# Patient Record
Sex: FEMALE | Race: WHITE | NOT HISPANIC OR LATINO | Employment: FULL TIME | URBAN - METROPOLITAN AREA
[De-identification: names, ages, dates, MRNs, and addresses within clinical notes are randomized per-mention and may not be internally consistent; named-entity substitution may affect disease eponyms.]

---

## 2017-02-02 ENCOUNTER — OFFICE VISIT (OUTPATIENT)
Dept: DERMATOLOGY | Facility: CLINIC | Age: 27
End: 2017-02-02

## 2017-02-02 DIAGNOSIS — L63.9 AA (ALOPECIA AREATA): Primary | ICD-10-CM

## 2017-02-02 ASSESSMENT — PAIN SCALES - GENERAL: PAINLEVEL: NO PAIN (0)

## 2017-02-02 NOTE — NURSING NOTE
"Dermatology Rooming Note    Sarah A Riedell's goals for this visit include:   Chief Complaint   Patient presents with     Derm Problem     Patient comes to clinic today for AA. States \"it's worse.\"     Madhavi Quinteros, [facility]    "

## 2017-02-02 NOTE — PROGRESS NOTES
[facility] Dermatology Note   February 2, 2017      Dermatology Problem List:   1. Alopecia areata, ophiasis distribution,  Great response to intralesional Kenalog with  -  fexofenadine 180 mg daily  - PRN: clobetasol 0.05% shampoo TIW, minoxidil 5% foam daily to any affected areas      CC: Followup alopecia areata    History of Present Illness:   Ms. Riedell is a healthy 25-year-old female returning for follow-up alopecia areata, last seen 10/20/16 at which time the above topicals were continued, no intralesional corticosteroids required.    She returns to clinic today reporting 3 new areas of hair loss, including one particular concerning one along her L frontal hairline.    ROS:  General: o/w well no f/c/ns  SKIN: No new/changing moles    Past Medical History: Negative for chronic medical conditions.     SH: Works as a  for [employer] in [place] (started in Dec).    Family History:   Negative for melanoma, chronic skin or autoimmune conditions. Sister with hypothyroidism. No family members with alopecia.     Medications: Reviewed in Epic.     Allergies: Reviewed in Epic.       Physical exam:   GENERAL: A very pleasant, healthy, young, adult  female in no discomfort or distress, sitting upright, fully clothed.   SKIN: A focused examination of the scalp and face and hands/arms was significant for skin phototype II.   - near-completely aloepcic patches on the L frontal hairline (2x1.5cm), L parietal scalp (2x2cm) and the R parietal scalp (1x1cm)  - Remainder of examination unremarkable.     Assessment/Plan:  1. Alopecia areata, ophiasis pattern initally, excellent clinical response  - repeat ILK-10 to 15 sites, total 1.5cc.  - continue minoxidil 5% foam daily to affected areas.  - in 1-2 weeks, resume clobetasol 0.05% shampoo TIW PRN (applied sparingly to dry scalp 3x a week, left in place for 15 minutes before rinsing)  - continue fexofenadine 180 mg daily    Return to clinic  in 4-6 weeks.    Discussed and examined with [facility] dermatology staff Dr. Lynne Garrett.    Tobias Murray MD  PGY-4, Dermatology    .I was present for key portions of the history and exam.  See resident note for pertinent history, exam, and treatment plan.  Lynne Garrett MD

## 2017-02-02 NOTE — MR AVS SNAPSHOT
After Visit Summary   2/2/2017    Sarah A Riedell    MRN: [id number]           Patient Information     Date Of Birth          1990        Visit Information        Provider Department      2/2/2017 8:15 AM Tobias Murray MD [facility] Dermatology         Follow-ups after your visit        Your next 10 appointments already scheduled     Mar 09, 2017  9:00 AM   (Arrive by 8:45 AM)   Return Visit with MD CARRIE Winter [facility] Dermatology ([facility] [place])    [address] [place] [id number] [phone]              Who to contact     Please call your clinic at [phone] to:    Ask questions about your health    Make or cancel appointments    Discuss your medicines    Learn about your test results    Speak to your doctor   If you have compliments or concerns about an experience at your clinic, or if you wish to file a complaint, please contact [facility] Patient Relations at [phone] or email us at [email or web address]         Additional Information About Your Visit        MyChart Information     [employer] gives you secure access to your electronic health record. If you see a primary care provider, you can also send messages to your care team and make appointments. If you have questions, please call your primary care clinic.  If you do not have a primary care provider, please call [phone] and they will assist you.      [employer] is an electronic gateway that provides easy, online access to your medical records. With [employer], you can request a clinic appointment, read your test results, renew a prescription or communicate with your care team.     To access your existing account, please contact your [facility] or call [phone] for assistance.        Care EveryWhere ID     This is your Care EveryWhere ID. This could be used by other organizations to access your [place]  medical records  [id number]         Blood Pressure from Last 3 Encounters:   10/02/15 106/69   14 100/76   14 102/61    Weight from Last 3 Encounters:   10/02/15 71.396 kg (157 lb 6.4 oz)   14 71.85 kg (158 lb 6.4 oz)   14 71.668 kg (158 lb)              Today, you had the following     No orders found for display       Primary Care Provider Office Phone # Fax #    Alta ESTIVEN Zapata [phone]       [facility]    [address] [facility] [address]  [facility] [address]        Thank you!     Thank you for choosing [facility] DERMATOLOGY  for your care. Our goal is always to provide you with excellent care. Hearing back from our patients is one way we can continue to improve our services. Please take a few minutes to complete the written survey that you may receive in the mail after your visit with us. Thank you!             Your Updated Medication List - Protect others around you: Learn how to safely use, store and throw away your medicines at www.disposemymeds.org.          This list is accurate as of: 17  8:29 AM.  Always use your most recent med list.                   Brand Name Dispense Instructions for use    clobetasol propionate 0.05 % Sham     1 Bottle    Apply sparingly to dry scalp once daily as needed.  Leave in place for 15 minutes then add water, lather and rinse thoroughly.       fexofenadine 180 MG tablet    ALLEGRA    90 tablet    Take 1 tablet (180 mg) by mouth daily       KARIVA 0.15-0.02/0.01 MG () per tablet   Generic drug:  desogestrel-ethinyl estradiol     28 tablet    TAKE 1 TABLET BY MOUTH EVERY DAY       PRE-ZAID PO      Take by mouth daily

## 2017-02-02 NOTE — Clinical Note
2/2/2017       RE: Sarah A Riedell  [address]  [place] [address]     Dear Colleague,    Thank you for referring your patient, Sarah A Riedell, to the [facility] DERMATOLOGY at [facility]. Please see a copy of my visit note below.    [facility] Dermatology Note   February 2, 2017      Dermatology Problem List:   1. Alopecia areata, ophiasis distribution,  Great response to intralesional Kenalog with  -  fexofenadine 180 mg daily  - PRN: clobetasol 0.05% shampoo TIW, minoxidil 5% foam daily to any affected areas      CC: Followup alopecia areata    History of Present Illness:   Ms. Riedell is a healthy 25-year-old female returning for follow-up alopecia areata, last seen 10/20/16 at which time the above topicals were continued, no intralesional corticosteroids required.    She returns to clinic today reporting 3 new areas of hair loss, including one particular concerning one along her L frontal hairline.    ROS:  General: o/w well no f/c/ns  SKIN: No new/changing moles    Past Medical History: Negative for chronic medical conditions.     SH: Works as a  for [employer] in [place] (started in Dec).    Family History:   Negative for melanoma, chronic skin or autoimmune conditions. Sister with hypothyroidism. No family members with alopecia.     Medications: Reviewed in Epic.     Allergies: Reviewed in Epic.       Physical exam:   GENERAL: A very pleasant, healthy, young, adult  female in no discomfort or distress, sitting upright, fully clothed.   SKIN: A focused examination of the scalp and face and hands/arms was significant for skin phototype II.   - near-completely aloepcic patches on the L frontal hairline (2x1.5cm), L parietal scalp (2x2cm) and the R parietal scalp (1x1cm)  - Remainder of examination unremarkable.     Assessment/Plan:  1. Alopecia areata, ophiasis pattern initally, excellent clinical response  - repeat ILK-10  to 15 sites, total 1.5cc.  - continue minoxidil 5% foam daily to affected areas.  - in 1-2 weeks, resume clobetasol 0.05% shampoo TIW PRN (applied sparingly to dry scalp 3x a week, left in place for 15 minutes before rinsing)  - continue fexofenadine 180 mg daily    Return to clinic in 4-6 weeks.    Discussed and examined with [facility] dermatology staff Dr. Lynne Garrett.    Tobias Murray MD  PGY-4, Dermatology    .I was present for key portions of the history and exam.  See resident note for pertinent history, exam, and treatment plan.  Lynne Garrett MD                  Pictures taken of patient today to be placed in chart for future reference.        Again, thank you for allowing me to participate in the care of your patient.      Sincerely,    Tobias Murray MD

## 2017-03-09 ENCOUNTER — OFFICE VISIT (OUTPATIENT)
Dept: DERMATOLOGY | Facility: CLINIC | Age: 27
End: 2017-03-09

## 2017-03-09 DIAGNOSIS — L63.9 AA (ALOPECIA AREATA): Primary | ICD-10-CM

## 2017-03-09 RX ORDER — FLUOCINONIDE 0.5 MG/G
CREAM TOPICAL
Qty: 60 G | Refills: 5 | Status: SHIPPED | OUTPATIENT
Start: 2017-03-09 | End: 2017-08-24

## 2017-03-09 ASSESSMENT — PAIN SCALES - GENERAL: PAINLEVEL: NO PAIN (0)

## 2017-03-09 NOTE — MR AVS SNAPSHOT
After Visit Summary   3/9/2017    Sarah A Riedell    MRN: [id number]           Patient Information     Date Of Birth          1990        Visit Information        Provider Department      3/9/2017 9:00 AM Tobias Murray MD [facility] Dermatology        Today's Diagnoses     AA (alopecia areata)    -  1       Follow-ups after your visit        Your next 10 appointments already scheduled     Apr 20, 2017  9:15 AM CDT   (Arrive by 9:00 AM)   Return Visit with Tobias Murray MD   [facility] Dermatology ([facility] [place])    [address] [place] [address] [phone]              Who to contact     Please call your clinic at [phone] to:    Ask questions about your health    Make or cancel appointments    Discuss your medicines    Learn about your test results    Speak to your doctor   If you have compliments or concerns about an experience at your clinic, or if you wish to file a complaint, please contact [facility] Patient Relations at [phone] or email us at [email or web address]         Additional Information About Your Visit        MyChart Information     [employer] gives you secure access to your electronic health record. If you see a primary care provider, you can also send messages to your care team and make appointments. If you have questions, please call your primary care clinic.  If you do not have a primary care provider, please call [phone] and they will assist you.      [employer] is an electronic gateway that provides easy, online access to your medical records. With [employer], you can request a clinic appointment, read your test results, renew a prescription or communicate with your care team.     To access your existing account, please contact your [facility] or call [phone] for assistance.        Care EveryWhere ID     This is your Care EveryWhere ID. This  could be used by other organizations to access your [place] medical records  [id number]         Blood Pressure from Last 3 Encounters:   10/02/15 106/69   12/04/14 100/76   06/12/14 102/61    Weight from Last 3 Encounters:   10/02/15 71.4 kg (157 lb 6.4 oz)   12/04/14 71.8 kg (158 lb 6.4 oz)   06/12/14 71.7 kg (158 lb)              We Performed the Following     INJECTION INTO SKIN LESIONS <=7          Today's Medication Changes          These changes are accurate as of: 3/9/17 11:52 AM.  If you have any questions, ask your nurse or doctor.               Start taking these medicines.        Dose/Directions    fluocinonide 0.05 % cream   Commonly known as:  LIDEX   Used for:  AA (alopecia areata)   Started by:  Tobias Murray MD        Apply twice daily to affected areas   Quantity:  60 g   Refills:  5       triamcinolone acetonide 10 MG/ML injection   Commonly known as:  KENALOG   Used for:  AA (alopecia areata)   Started by:  Tobias Murray MD        Dose:  10 mg   Inject 1 mL (10 mg) into the skin once for 1 dose   Quantity:  1 mL   Refills:  0            Where to get your medicines      These medications were sent to [facility] [address] [place] [id number]    Hours:  24-hours Phone:  [phone]     fluocinonide 0.05 % cream         Some of these will need a paper prescription and others can be bought over the counter.  Ask your nurse if you have questions.     You don't need a prescription for these medications     triamcinolone acetonide 10 MG/ML injection                Primary Care Provider Office Phone # Fax #    Alta Zapata [phone]       [facility]    [address] [facility] [place] [address] [address]        Thank you!     Thank you for choosing [facility] DERMATOLOGY  for your care. Our goal is always to provide you with excellent care. Hearing back from  our patients is one way we can continue to improve our services. Please take a few minutes to complete the written survey that you may receive in the mail after your visit with us. Thank you!             Your Updated Medication List - Protect others around you: Learn how to safely use, store and throw away your medicines at www.disposemymeds.org.          This list is accurate as of: 3/9/17 11:52 AM.  Always use your most recent med list.                   Brand Name Dispense Instructions for use    clobetasol propionate 0.05 % Sham     1 Bottle    Apply sparingly to dry scalp once daily as needed.  Leave in place for 15 minutes then add water, lather and rinse thoroughly.       fexofenadine 180 MG tablet    ALLEGRA    90 tablet    Take 1 tablet (180 mg) by mouth daily       fluocinonide 0.05 % cream    LIDEX    60 g    Apply twice daily to affected areas       KARIVA 0.15-0.02/0.01 MG (/) per tablet   Generic drug:  desogestrel-ethinyl estradiol     28 tablet    TAKE 1 TABLET BY MOUTH EVERY DAY       PRE-ZAID PO      Take by mouth daily       triamcinolone acetonide 10 MG/ML injection    KENALOG    1 mL    Inject 1 mL (10 mg) into the skin once for 1 dose

## 2017-03-09 NOTE — LETTER
3/9/2017       RE: Sarah A Riedell  [address]  [place] [address]     Dear Colleague,    Thank you for referring your patient, Sarah A Riedell, to the [facility] DERMATOLOGY at [facility]. Please see a copy of my visit note below.              Pictures taken of patient today to be placed in chart for future reference.        [facility] Dermatology Note   March 9, 2017      Dermatology Problem List:   1. Alopecia areata, ophiasis distribution,  Great response to intralesional Kenalog with  -  fexofenadine 180 mg daily  - PRN: clobetasol 0.05% shampoo TIW, minoxidil 5% foam daily to any affected areas      CC: Followup alopecia areata    History of Present Illness:   Ms. Riedell is a healthy 25-year-old female returning for follow-up alopecia areata, last seen 2/2/17 at which time she underwent ILK-10 to 3 new areas.    She returns to clinic today reporting improvemen in the previously treated 3 areas, no new areas or scalp symptoms. She has continued fexofenadine and minoxidil but has not resumed clobetasol 0.05% shampoo.    ROS:  General: o/w well no f/c/ns  SKIN: No new/changing moles    Past Medical History: Negative for chronic medical conditions.     SH: Works as a  for [employer] in [place] (started in Dec).    Family History:   Negative for melanoma, chronic skin or autoimmune conditions. Sister with hypothyroidism. No family members with alopecia.     Medications: Reviewed in Epic.     Allergies: Reviewed in Epic.       Physical exam:   GENERAL: A very pleasant, healthy, young, adult  female in no discomfort or distress, sitting upright, fully clothed.   SKIN: A focused examination of the scalp and face and hands/arms was significant for skin phototype II.   - near-completely aloepcic patches on the L frontal hairline (2x1.5cm with terminal hair regrowth but moderate atrophy throughout), L parietal scalp (2x2cm with  sparse regrowth) and the R parietal scalp (1x1cm with moderate regrowth throughout but a sparse center)  - Remainder of examination unremarkable.     Assessment/Plan:  1. Alopecia areata, ophiasis pattern initally, excellent clinical response to intralesional triamcinolone  - repeat ILK-10 to 10 sites, total 1.0cc.  - continue minoxidil 5% foam daily to affected areas.  - in 1-2 weeks, start fluocinonide 0.05% cream BID to all affected areas    Hold clobetasol 0.05% shampoo TIW PRN (applied sparingly to dry scalp 3x a week, left in place for 15 minutes before rinsing)  - continue fexofenadine 180 mg daily    Return to clinic in 4-6 weeks.    Discussed and examined with [facility] dermatology staff Dr. Lynne Garrett.    Tobias Murray MD  PGY-4, Dermatology   .I, Lynne Garrett MD, saw this patient with the resident and agree with the resident s findings and plan of care as documented in the resident s note.

## 2017-03-09 NOTE — PROGRESS NOTES
[facility] Dermatology Note   March 9, 2017      Dermatology Problem List:   1. Alopecia areata, ophiasis distribution,  Great response to intralesional Kenalog with  -  fexofenadine 180 mg daily  - PRN: clobetasol 0.05% shampoo TIW, minoxidil 5% foam daily to any affected areas      CC: Followup alopecia areata    History of Present Illness:   Ms. Riedell is a healthy 25-year-old female returning for follow-up alopecia areata, last seen 2/2/17 at which time she underwent ILK-10 to 3 new areas.    She returns to clinic today reporting improvemen in the previously treated 3 areas, no new areas or scalp symptoms. She has continued fexofenadine and minoxidil but has not resumed clobetasol 0.05% shampoo.    ROS:  General: o/w well no f/c/ns  SKIN: No new/changing moles    Past Medical History: Negative for chronic medical conditions.     SH: Works as a  for [employer] in [place] (started in Dec).    Family History:   Negative for melanoma, chronic skin or autoimmune conditions. Sister with hypothyroidism. No family members with alopecia.     Medications: Reviewed in Epic.     Allergies: Reviewed in Epic.       Physical exam:   GENERAL: A very pleasant, healthy, young, adult  female in no discomfort or distress, sitting upright, fully clothed.   SKIN: A focused examination of the scalp and face and hands/arms was significant for skin phototype II.   - near-completely aloepcic patches on the L frontal hairline (2x1.5cm with terminal hair regrowth but moderate atrophy throughout), L parietal scalp (2x2cm with sparse regrowth) and the R parietal scalp (1x1cm with moderate regrowth throughout but a sparse center)  - Remainder of examination unremarkable.     Assessment/Plan:  1. Alopecia areata, ophiasis pattern initally, excellent clinical response to intralesional triamcinolone  - repeat ILK-10 to 10 sites, total 1.0cc.  - continue minoxidil 5% foam daily to affected areas.  - in 1-2  weeks, start fluocinonide 0.05% cream BID to all affected areas    Hold clobetasol 0.05% shampoo TIW PRN (applied sparingly to dry scalp 3x a week, left in place for 15 minutes before rinsing)  - continue fexofenadine 180 mg daily    Return to clinic in 4-6 weeks.    Discussed and examined with [facility] dermatology staff Dr. Lynne Garrett.    Tobias Murray MD  PGY-4, Dermatology   .I, Lynne Garrett MD, saw this patient with the resident and agree with the resident s findings and plan of care as documented in the resident s note.

## 2017-03-09 NOTE — NURSING NOTE
"Dermatology Rooming Note    Sarah A Riedell's goals for this visit include:   Chief Complaint   Patient presents with     Derm Problem     Derikt comes to clinic today for hairloss. States \"some spots are better.\"     Madhavi Quinteros, [facility]      "

## 2017-04-20 ENCOUNTER — OFFICE VISIT (OUTPATIENT)
Dept: DERMATOLOGY | Facility: CLINIC | Age: 27
End: 2017-04-20

## 2017-04-20 DIAGNOSIS — L63.9 AA (ALOPECIA AREATA): Primary | ICD-10-CM

## 2017-04-20 RX ORDER — FLUOCINONIDE TOPICAL SOLUTION USP, 0.05% 0.5 MG/ML
SOLUTION TOPICAL
Qty: 60 ML | Refills: 5 | Status: SHIPPED | OUTPATIENT
Start: 2017-04-20 | End: 2017-08-24

## 2017-04-20 ASSESSMENT — PAIN SCALES - GENERAL: PAINLEVEL: NO PAIN (0)

## 2017-04-20 NOTE — LETTER
4/20/2017       RE: Sarah A Riedell  [address]  [place] [address]     Dear Colleague,    Thank you for referring your patient, Sarah A Riedell, to the [facility] DERMATOLOGY at [facility]. Please see a copy of my visit note below.    [facility] Dermatology Note   April 20, 2017    Dermatology Problem List:   1. Alopecia areata, ophiasis distribution,  Great response to intralesional Kenalog  -  fexofenadine 180 mg daily, minoxidil 5% foam daily   - PRN: fluocinonide 0.05% solution, clobetasol 0.05% shampoo TIW    CC: Followup alopecia areata    History of Present Illness:   Ms. Riedell is a healthy 26-year-old female returning for follow-up alopecia areata, last seen 6 weeks ago at which time she underwent ILK-10 to 3 areas areas and was prescribed fluocinonide 0.05% cream.    She returns to clinic today reporting received fluocinonide 0.05% ointment instead of cream, and subtle improvement in the previously treated 3 area but persistence overall. No new areas or scalp symptoms. She has continued fexofenadine and minoxidil, and his currently holding clobetasol 0.05% shampoo.    ROS:  General: o/w well no f/c/ns  SKIN: No new/changing moles    Past Medical History: Negative for chronic medical conditions.     SH: Works as a  for [employer] in [place] (started in Dec).    Family History:   Negative for melanoma, chronic skin or autoimmune conditions. Sister with hypothyroidism. No family members with alopecia.     Medications: Reviewed in Epic.     Allergies: Reviewed in Epic.       Physical exam:   GENERAL: A very pleasant, healthy, young, adult  female in no discomfort or distress, sitting upright, fully clothed.   SKIN: A focused examination of the scalp and face and hands/arms was significant for skin phototype II.   - near-completely aloepcic patches on the L frontal hairline (2x1.5cm with terminal hair regrowth but moderate  atrophy throughout), L parietal scalp (2x2cm with sparse regrowth) and the R parietal scalp (1x1cm with moderate regrowth throughout but a sparse center)  - Remainder of examination unremarkable.     Assessment/Plan:  1. Alopecia areata, ophiasis pattern initally, excellent clinical response to intralesional triamcinolone  - repeat ILK-10 to 10 sites, total 1.5cc.  - continue minoxidil 5% foam daily to affected areas.  - hold fluocinonide 0.05% ointment  - in 1-2 weeks, start fluocinonide 0.05% solution BID to all affected areas    Hold clobetasol 0.05% shampoo TIW PRN (applied sparingly to dry scalp 3x a week, left in place for 15 minutes before rinsing)  - continue fexofenadine 180 mg daily    Return to clinic in 4-6 weeks; she will cancel if density has returned to normal.    Discussed and examined with [facility] dermatology staff Dr. Lynne Garrett.  .I, Lynne Garrett MD, saw this patient with the resident and agree with the resident s findings and plan of care as documented in the resident s note.      Again, thank you for allowing me to participate in the care of your patient.      Sincerely,    Tobias Murray MD

## 2017-04-20 NOTE — NURSING NOTE
"Dermatology Rooming Note    Sarah A Riedell's goals for this visit include:   Chief Complaint   Patient presents with     Derm Problem     Lucrecia comes to clinic today for hairloss. States \"I think it's about the same.\"     Madhavi Quinteros, [facility]    "

## 2017-04-20 NOTE — PROGRESS NOTES
[facility] Dermatology Note   April 20, 2017    Dermatology Problem List:   1. Alopecia areata, ophiasis distribution,  Great response to intralesional Kenalog  -  fexofenadine 180 mg daily, minoxidil 5% foam daily   - PRN: fluocinonide 0.05% solution, clobetasol 0.05% shampoo TIW    CC: Followup alopecia areata    History of Present Illness:   Ms. Riedell is a healthy 26-year-old female returning for follow-up alopecia areata, last seen 6 weeks ago at which time she underwent ILK-10 to 3 areas areas and was prescribed fluocinonide 0.05% cream.    She returns to clinic today reporting received fluocinonide 0.05% ointment instead of cream, and subtle improvement in the previously treated 3 area but persistence overall. No new areas or scalp symptoms. She has continued fexofenadine and minoxidil, and his currently holding clobetasol 0.05% shampoo.    ROS:  General: o/w well no f/c/ns  SKIN: No new/changing moles    Past Medical History: Negative for chronic medical conditions.     SH: Works as a  for [employer] in [place] (started in Dec).    Family History:   Negative for melanoma, chronic skin or autoimmune conditions. Sister with hypothyroidism. No family members with alopecia.     Medications: Reviewed in Epic.     Allergies: Reviewed in Epic.       Physical exam:   GENERAL: A very pleasant, healthy, young, adult  female in no discomfort or distress, sitting upright, fully clothed.   SKIN: A focused examination of the scalp and face and hands/arms was significant for skin phototype II.   - near-completely aloepcic patches on the L frontal hairline (2x1.5cm with terminal hair regrowth but moderate atrophy throughout), L parietal scalp (2x2cm with sparse regrowth) and the R parietal scalp (1x1cm with moderate regrowth throughout but a sparse center)  - Remainder of examination unremarkable.     Assessment/Plan:  1. Alopecia areata, ophiasis pattern initally, excellent clinical  response to intralesional triamcinolone  - repeat ILK-10 to 10 sites, total 1.5cc.  - continue minoxidil 5% foam daily to affected areas.  - hold fluocinonide 0.05% ointment  - in 1-2 weeks, start fluocinonide 0.05% solution BID to all affected areas    Hold clobetasol 0.05% shampoo TIW PRN (applied sparingly to dry scalp 3x a week, left in place for 15 minutes before rinsing)  - continue fexofenadine 180 mg daily    Return to clinic in 4-6 weeks; she will cancel if density has returned to normal.    Discussed and examined with [facility] dermatology staff Dr. Lynne Garrett.  .I, Lynne Garrett MD, saw this patient with the resident and agree with the resident s findings and plan of care as documented in the resident s note.

## 2017-04-20 NOTE — MR AVS SNAPSHOT
After Visit Summary   4/20/2017    Sarah A Riedell    MRN: [id number]           Patient Information     Date Of Birth          1990        Visit Information        Provider Department      4/20/2017 9:15 AM Tobias Murray MD [facility] Dermatology        Today's Diagnoses     AA (alopecia areata)    -  1       Follow-ups after your visit        Your next 10 appointments already scheduled     May 25, 2017  8:15 AM CDT   (Arrive by 8:00 AM)   Return Visit with Tobias Murray MD   [facility] Dermatology ([facility])    [address] [place] [id number] [phone]              Who to contact     Please call your clinic at [phone] to:    Ask questions about your health    Make or cancel appointments    Discuss your medicines    Learn about your test results    Speak to your doctor   If you have compliments or concerns about an experience at your clinic, or if you wish to file a complaint, please contact [facility] Physicians Patient Relations at [phone] or email us at [email or web address]         Additional Information About Your Visit        MyChart Information     [employer] gives you secure access to your electronic health record. If you see a primary care provider, you can also send messages to your care team and make appointments. If you have questions, please call your primary care clinic.  If you do not have a primary care provider, please call [phone] and they will assist you.      [employer] is an electronic gateway that provides easy, online access to your medical records. With [employer], you can request a clinic appointment, read your test results, renew a prescription or communicate with your care team.     To access your existing account, please contact your [facility] Clinic or call [phone] for assistance.        Care EveryWhere ID     This is your Care EveryWhere ID. This  could be used by other organizations to access your [place] medical records  [id number]         Blood Pressure from Last 3 Encounters:   10/02/15 106/69   12/04/14 100/76   06/12/14 102/61    Weight from Last 3 Encounters:   10/02/15 71.4 kg (157 lb 6.4 oz)   12/04/14 71.8 kg (158 lb 6.4 oz)   06/12/14 71.7 kg (158 lb)              We Performed the Following     INJECTION INTO SKIN LESIONS <=7          Today's Medication Changes          These changes are accurate as of: 4/20/17 11:59 PM.  If you have any questions, ask your nurse or doctor.               Start taking these medicines.        Dose/Directions    triamcinolone acetonide 10 MG/ML injection   Commonly known as:  KENALOG   Used for:  AA (alopecia areata)   Started by:  Tobias Murray MD        Dose:  15 mg   Inject 1.5 mLs (15 mg) into the skin once for 1 dose   Quantity:  1.5 mL   Refills:  0         These medicines have changed or have updated prescriptions.        Dose/Directions    * fluocinonide 0.05 % cream   Commonly known as:  LIDEX   This may have changed:  Another medication with the same name was added. Make sure you understand how and when to take each.   Used for:  AA (alopecia areata)   Changed by:  Tobias Murray MD        Apply twice daily to affected areas   Quantity:  60 g   Refills:  5       * fluocinonide 0.05 % solution   Commonly known as:  LIDEX   This may have changed:  You were already taking a medication with the same name, and this prescription was added. Make sure you understand how and when to take each.   Used for:  AA (alopecia areata)   Changed by:  Tobias Murray MD        Apply topically 2 times daily to the scalp   Quantity:  60 mL   Refills:  5       * Notice:  This list has 2 medication(s) that are the same as other medications prescribed for you. Read the directions carefully, and ask your doctor or other care provider to review them with you.         Where to get your medicines      These medications were  sent to [facility] [address] [facility] [address] [place] [address] AT [facility]  [address] [facility] [address]     Phone:  [phone]     fluocinonide 0.05 % solution         Some of these will need a paper prescription and others can be bought over the counter.  Ask your nurse if you have questions.     You don't need a prescription for these medications     triamcinolone acetonide 10 MG/ML injection                Primary Care Provider Office Phone # Fax #    Alta ESTIVEN Zapata [phone]       [facility]    [address] [facility] [address]  [facility] [address]        Thank you!     Thank you for choosing [facility] DERMATOLOGY  for your care. Our goal is always to provide you with excellent care. Hearing back from our patients is one way we can continue to improve our services. Please take a few minutes to complete the written survey that you may receive in the mail after your visit with us. Thank you!             Your Updated Medication List - Protect others around you: Learn how to safely use, store and throw away your medicines at www.disposemymeds.org.          This list is accurate as of: 17 11:59 PM.  Always use your most recent med list.                   Brand Name Dispense Instructions for use    clobetasol propionate 0.05 % Sham     1 Bottle    Apply sparingly to dry scalp once daily as needed.  Leave in place for 15 minutes then add water, lather and rinse thoroughly.       fexofenadine 180 MG tablet    ALLEGRA    90 tablet    Take 1 tablet (180 mg) by mouth daily       * fluocinonide 0.05 % cream    LIDEX    60 g    Apply twice daily to affected areas       * fluocinonide 0.05 % solution    LIDEX    60 mL    Apply topically 2 times daily to the scalp       KARIVA 0.15-0.02/0.01 MG (/5) per tablet   Generic drug:  desogestrel-ethinyl estradiol     28 tablet    TAKE 1 TABLET BY MOUTH EVERY DAY       PRE- PO      Take by mouth daily        triamcinolone acetonide 10 MG/ML injection    KENALOG    1.5 mL    Inject 1.5 mLs (15 mg) into the skin once for 1 dose       * Notice:  This list has 2 medication(s) that are the same as other medications prescribed for you. Read the directions carefully, and ask your doctor or other care provider to review them with you.

## 2017-04-27 ENCOUNTER — OFFICE VISIT (OUTPATIENT)
Dept: DERMATOLOGY | Facility: CLINIC | Age: 27
End: 2017-04-27

## 2017-04-27 DIAGNOSIS — L63.9 AA (ALOPECIA AREATA): Primary | ICD-10-CM

## 2017-04-27 ASSESSMENT — PAIN SCALES - GENERAL: PAINLEVEL: NO PAIN (0)

## 2017-04-27 NOTE — NURSING NOTE
Dermatology Rooming Note    Lucrecia ROHAN Prasannasam's goals for this visit include:   Chief Complaint   Patient presents with     Derm Problem     Lucrecia comes to clinic today for injections to her alopecia aerata.     Madhavi Quinteros, CMA

## 2017-04-27 NOTE — PROGRESS NOTES
DERMATOLOGY FOLLOW-UP VISIT  April 27, 2017    S: 26F returning for 1wk follow-up, for treatment of a spot not noticed at her visit 1 week ago. No other concerns.    O:  GEN: Pleasant, comfortable  SKIN: Focused scalp exam:  - alopecic 2cm round patch, mid posterior frontal scalp    A/P:  1. Alopecia areata  - ILK 10 to 1 area, total 0.5cc.     RTC 4-5 weeks per previous plan.    Discussed and examined with [facility] staff dermatologist Dr. MEGAN Murray MD, RODRIGUE  PGY-4, Dermatology

## 2017-04-27 NOTE — MR AVS SNAPSHOT
After Visit Summary   4/27/2017    Sarah A Riedell    MRN: [id number]           Patient Information     Date Of Birth          1990        Visit Information        Provider Department      4/27/2017 7:45 AM Tobias Murray MD [facility] Dermatology        Today's Diagnoses     AA (alopecia areata)    -  1       Follow-ups after your visit        Your next 10 appointments already scheduled     May 25, 2017  8:15 AM CDT   (Arrive by 8:00 AM)   Return Visit with Tobias Murray MD   [facility] Dermatology ([facility] [place])    [address] [place] [address] [phone]              Who to contact     Please call your clinic at [phone] to:    Ask questions about your health    Make or cancel appointments    Discuss your medicines    Learn about your test results    Speak to your doctor   If you have compliments or concerns about an experience at your clinic, or if you wish to file a complaint, please contact [facility] Physicians Patient Relations at [phone] or email us at [email or web address]         Additional Information About Your Visit        MyChart Information     [employer] gives you secure access to your electronic health record. If you see a primary care provider, you can also send messages to your care team and make appointments. If you have questions, please call your primary care clinic.  If you do not have a primary care provider, please call [phone] and they will assist you.      [employer] is an electronic gateway that provides easy, online access to your medical records. With [employer], you can request a clinic appointment, read your test results, renew a prescription or communicate with your care team.     To access your existing account, please contact your [facility] Phys[facility] Clinic or call [phone] for assistance.        Care EveryWhere ID     This is your Care EveryWhere ID. This  could be used by other organizations to access your [place] medical records  [id number]         Blood Pressure from Last 3 Encounters:   No data found for BP    Weight from Last 3 Encounters:   No data found for Wt              Today, you had the following     No orders found for display         Today's Medication Changes          These changes are accurate as of: 4/27/17 11:59 PM.  If you have any questions, ask your nurse or doctor.               Start taking these medicines.        Dose/Directions    triamcinolone acetonide 10 MG/ML injection   Commonly known as:  KENALOG   Used for:  AA (alopecia areata)   Started by:  Tobias Murray MD        Dose:  5 mg   Inject 0.5 mLs (5 mg) into the skin once for 1 dose   Quantity:  0.5 mL   Refills:  0            Where to get your medicines      Some of these will need a paper prescription and others can be bought over the counter.  Ask your nurse if you have questions.     You don't need a prescription for these medications     triamcinolone acetonide 10 MG/ML injection                Primary Care Provider Office Phone # Fax #    Alta Zapata [phone]       [facility]    [address] [facility] [address]  [facility] [address]        Thank you!     Thank you for choosing [facility] DERMATOLOGY  for your care. Our goal is always to provide you with excellent care. Hearing back from our patients is one way we can continue to improve our services. Please take a few minutes to complete the written survey that you may receive in the mail after your visit with us. Thank you!             Your Updated Medication List - Protect others around you: Learn how to safely use, store and throw away your medicines at www.disposemymeds.org.          This list is accurate as of: 4/27/17 11:59 PM.  Always use your most recent med list.                   Brand Name Dispense Instructions for use    clobetasol propionate 0.05 % Sham     1 Bottle    Apply  sparingly to dry scalp once daily as needed.  Leave in place for 15 minutes then add water, lather and rinse thoroughly.       fexofenadine 180 MG tablet    ALLEGRA    90 tablet    Take 1 tablet (180 mg) by mouth daily       * fluocinonide 0.05 % cream    LIDEX    60 g    Apply twice daily to affected areas       * fluocinonide 0.05 % solution    LIDEX    60 mL    Apply topically 2 times daily to the scalp       KARIVA 0.15-0.02/0.01 MG () per tablet   Generic drug:  desogestrel-ethinyl estradiol     28 tablet    TAKE 1 TABLET BY MOUTH EVERY DAY       PRE- PO      Take by mouth daily       triamcinolone acetonide 10 MG/ML injection    KENALOG    0.5 mL    Inject 0.5 mLs (5 mg) into the skin once for 1 dose       * Notice:  This list has 2 medication(s) that are the same as other medications prescribed for you. Read the directions carefully, and ask your doctor or other care provider to review them with you.

## 2017-04-27 NOTE — LETTER
4/27/2017       RE: Sarah A Riedell  [address]  [place] [address]     Dear Colleague,    Thank you for referring your patient, Sarah A Riedell, to the [facility] DERMATOLOGY at [facility]. Please see a copy of my visit note below.    DERMATOLOGY FOLLOW-UP VISIT  April 27, 2017    S: 26F returning for 1wk follow-up, for treatment of a spot not noticed at her visit 1 week ago. No other concerns.    O:  GEN: Pleasant, comfortable  SKIN: Focused scalp exam:  - alopecic 2cm round patch, mid posterior frontal scalp    A/P:  1. Alopecia areata  - ILK 10 to 1 area, total 0.5cc.     RTC 4-5 weeks per previous plan.    Discussed and examined with [facility] staff dermatologist Dr. MEGAN Murray MD, RODRIGUE  PGY-4, Dermatology      Again, thank you for allowing me to participate in the care of your patient.      Sincerely,    Tobias Murray MD

## 2017-05-09 ENCOUNTER — MYC MEDICAL ADVICE (OUTPATIENT)
Dept: DERMATOLOGY | Facility: CLINIC | Age: 27
End: 2017-05-09

## 2017-05-09 DIAGNOSIS — L63.9 AA (ALOPECIA AREATA): Primary | ICD-10-CM

## 2017-05-11 RX ORDER — PREDNISONE 10 MG/1
TABLET ORAL
Qty: 80 TABLET | Refills: 0 | Status: SHIPPED | OUTPATIENT
Start: 2017-05-11 | End: 2017-08-24

## 2017-05-11 NOTE — PROGRESS NOTES
I talked with and examined Lucrecia MADRIGAL Riedell and I agree with the assessment and the plan. I was present for the entire procedure. PRUDENCE Gottlieb MD.

## 2017-05-25 ENCOUNTER — TELEPHONE (OUTPATIENT)
Dept: DERMATOLOGY | Facility: CLINIC | Age: 27
End: 2017-05-25

## 2017-05-25 DIAGNOSIS — L63.9 AA (ALOPECIA AREATA): Primary | ICD-10-CM

## 2017-05-25 RX ORDER — PREDNISONE 10 MG/1
TABLET ORAL
Qty: 84 TABLET | Refills: 0 | Status: SHIPPED | OUTPATIENT
Start: 2017-05-25 | End: 2017-08-24

## 2017-05-25 NOTE — TELEPHONE ENCOUNTER
Alopecia pt, met in clinic @ appt, pt wanting to try Xeljanz (has tried ILK injections, Rogaine, topicals, prednisone in the past). Explained process and expected timeline to pt. She desires phone calls for updates. PA initiated and pt given her portion of free drug application to complete if/ when Xeljanz denied.

## 2017-05-26 DIAGNOSIS — L63.9 ALOPECIA AREATA: ICD-10-CM

## 2017-05-26 LAB
ALBUMIN UR-MCNC: NEGATIVE MG/DL
APPEARANCE UR: CLEAR
BASOPHILS # BLD AUTO: 0 10E9/L (ref 0–0.2)
BASOPHILS NFR BLD AUTO: 0.3 %
BILIRUB UR QL STRIP: NEGATIVE
CHOLEST SERPL-MCNC: 179 MG/DL
COLOR UR AUTO: ABNORMAL
DEPRECATED CALCIDIOL+CALCIFEROL SERPL-MC: 39 UG/L (ref 20–75)
DIFFERENTIAL METHOD BLD: NORMAL
EOSINOPHIL # BLD AUTO: 0 10E9/L (ref 0–0.7)
EOSINOPHIL NFR BLD AUTO: 0 %
ERYTHROCYTE [DISTWIDTH] IN BLOOD BY AUTOMATED COUNT: 14.4 % (ref 10–15)
FERRITIN SERPL-MCNC: 4 NG/ML (ref 12–150)
GLUCOSE UR STRIP-MCNC: NEGATIVE MG/DL
HBV CORE AB SERPL QL IA: NONREACTIVE
HBV SURFACE AB SERPL IA-ACNC: 24.26 M[IU]/ML
HBV SURFACE AG SERPL QL IA: NONREACTIVE
HCT VFR BLD AUTO: 36.2 % (ref 35–47)
HCV AB SERPL QL IA: NORMAL
HDLC SERPL-MCNC: 82 MG/DL
HGB BLD-MCNC: 11.8 G/DL (ref 11.7–15.7)
HGB UR QL STRIP: NEGATIVE
HIV 1+2 AB+HIV1 P24 AG SERPL QL IA: NORMAL
IMM GRANULOCYTES # BLD: 0.1 10E9/L (ref 0–0.4)
IMM GRANULOCYTES NFR BLD: 0.6 %
IRON SATN MFR SERPL: 18 % (ref 15–46)
IRON SERPL-MCNC: 77 UG/DL (ref 35–180)
KETONES UR STRIP-MCNC: NEGATIVE MG/DL
LDLC SERPL CALC-MCNC: 80 MG/DL
LEUKOCYTE ESTERASE UR QL STRIP: NEGATIVE
LYMPHOCYTES # BLD AUTO: 0.8 10E9/L (ref 0.8–5.3)
LYMPHOCYTES NFR BLD AUTO: 9.2 %
MCH RBC QN AUTO: 27.8 PG (ref 26.5–33)
MCHC RBC AUTO-ENTMCNC: 32.6 G/DL (ref 31.5–36.5)
MCV RBC AUTO: 85 FL (ref 78–100)
MONOCYTES # BLD AUTO: 0.1 10E9/L (ref 0–1.3)
MONOCYTES NFR BLD AUTO: 0.9 %
NEUTROPHILS # BLD AUTO: 7.8 10E9/L (ref 1.6–8.3)
NEUTROPHILS NFR BLD AUTO: 89 %
NITRATE UR QL: NEGATIVE
NONHDLC SERPL-MCNC: 97 MG/DL
NRBC # BLD AUTO: 0 10*3/UL
NRBC BLD AUTO-RTO: 0 /100
PH UR STRIP: 7 PH (ref 5–7)
PLATELET # BLD AUTO: 363 10E9/L (ref 150–450)
PROT SERPL-MCNC: 7.4 G/DL (ref 6.8–8.8)
RBC # BLD AUTO: 4.24 10E12/L (ref 3.8–5.2)
RBC #/AREA URNS AUTO: 0 /HPF (ref 0–2)
SP GR UR STRIP: 1 (ref 1–1.03)
TIBC SERPL-MCNC: 434 UG/DL (ref 240–430)
TRIGL SERPL-MCNC: 88 MG/DL
TSH SERPL DL<=0.005 MIU/L-ACNC: 1.6 MU/L (ref 0.4–4)
URN SPEC COLLECT METH UR: ABNORMAL
UROBILINOGEN UR STRIP-MCNC: 0 MG/DL (ref 0–2)
WBC # BLD AUTO: 8.8 10E9/L (ref 4–11)
WBC #/AREA URNS AUTO: 0 /HPF (ref 0–2)

## 2017-05-26 NOTE — TELEPHONE ENCOUNTER
Denial from ins (as expected) based on diagnosis. Will call pt to let her know and move forward with application for hardship assistance.

## 2017-05-28 LAB — ZINC SERPL-MCNC: 84 UG/ML

## 2017-05-30 LAB
DHEA-S SERPL-MCNC: 19 UG/DL (ref 35–430)
M TB TUBERC IFN-G BLD QL: NEGATIVE
M TB TUBERC IFN-G/MITOGEN IGNF BLD: 0 IU/ML
THYROPEROXIDASE AB SERPL-ACNC: <10 IU/ML

## 2017-05-30 NOTE — TELEPHONE ENCOUNTER
Spoke to pt regarding denial and next steps. Will submit HCP portion to start the process once signed by MD.

## 2017-06-01 NOTE — PROGRESS NOTES
Reached by phone to discuss low iron but otherwise normal screening lab results. She has already purchased OTC iron supplement; cautioned RE: constipation/GI upset. Will keep same follow-up plan.

## 2017-06-01 NOTE — TELEPHONE ENCOUNTER
Received signed [employer] application from provider. Faxed application to [employer] along with PA denial and original application sent to scanning.

## 2017-06-02 NOTE — TELEPHONE ENCOUNTER
Free Drug     Medication Xeljanz  [employer]  Phone # [phone]  Fax # [phone]  Effective Dates Not yet approved - radha just started now that PA denied  Additional Information HCP portion of radha faxed to program 6/1, BI started RE: fax from [employer]/ Kaylan

## 2017-06-05 NOTE — TELEPHONE ENCOUNTER
Medication Appeal Initiation    We have initiated an appeal for the requested medication:  Medication: tofacitinib (Xeljanz) 5mg tablets  Appeal Start Date:   6/5/17  Insurance Company:  [employer]  Comments:   Per Kaylan, appeal needed for them to move forward. ALLEN written and faxed to [employer] with journal article and chart notes.

## 2017-06-05 NOTE — TELEPHONE ENCOUNTER
Ph call rec'd from [employer] program. They are completing their benefits investigation and will fax liaison a summary afterward. Rep Kimberly also states that in order to move forward with hardship assistance we will have to submit and appeal and get it denied. Will do this today and camron as urgent.

## 2017-06-09 NOTE — TELEPHONE ENCOUNTER
Calling P appeals at [phone] to check on status of [employer] appeal. Rep states denial upheld, fax sent yesterday. Fax rec'd and sent to [employer] to move fwd with pt [employer] radha.

## 2017-06-12 NOTE — TELEPHONE ENCOUNTER
BI rec'd again from [employer]. Also rec'd a phone call that they would like the PA denial paperwork refaxed. Will do this today. Uncertain if pt has sent in her portion of radha but program has been trying to contact her.

## 2017-06-15 ENCOUNTER — OFFICE VISIT (OUTPATIENT)
Dept: DERMATOLOGY | Facility: CLINIC | Age: 27
End: 2017-06-15

## 2017-06-15 ENCOUNTER — TELEPHONE (OUTPATIENT)
Dept: DERMATOLOGY | Facility: CLINIC | Age: 27
End: 2017-06-15

## 2017-06-15 DIAGNOSIS — L63.9 ALOPECIA AREATA: Primary | ICD-10-CM

## 2017-06-15 DIAGNOSIS — L63.9 AA (ALOPECIA AREATA): ICD-10-CM

## 2017-06-15 RX ORDER — PREDNISONE 10 MG/1
TABLET ORAL
Qty: 91 TABLET | Refills: 0 | Status: SHIPPED | OUTPATIENT
Start: 2017-06-15 | End: 2017-08-24

## 2017-06-15 RX ORDER — CLOBETASOL PROPIONATE 0.05 G/100ML
SHAMPOO TOPICAL
Qty: 1 BOTTLE | Refills: 3 | Status: SHIPPED | OUTPATIENT
Start: 2017-06-15 | End: 2017-11-30

## 2017-06-15 RX ORDER — FEXOFENADINE HCL 180 MG/1
180 TABLET ORAL DAILY
Qty: 90 TABLET | Refills: 3 | Status: SHIPPED | OUTPATIENT
Start: 2017-06-15 | End: 2018-06-11

## 2017-06-15 ASSESSMENT — PAIN SCALES - GENERAL: PAINLEVEL: NO PAIN (0)

## 2017-06-15 NOTE — MR AVS SNAPSHOT
After Visit Summary   6/15/2017    Sarah A Riedell    MRN: [id number]           Patient Information     Date Of Birth          1990        Visit Information        Provider Department      6/15/2017 11:15 AM Tobias Murray MD [facility] Dermatology        Today's Diagnoses     Alopecia areata    -  1    AA (alopecia areata)          Care Instructions    Hold prednisone 35 mg daily for 2 weeks, then 30 mg daily for 2 weeks.    Follow-up: 3-4 weeks.          Follow-ups after your visit        Your next 10 appointments already scheduled     Jun 29, 2017  7:45 AM CDT   (Arrive by 7:30 AM)   Return Visit with Tobias Murray MD   [facility] Dermatology ([facility])    [address] [address] [phone]              Who to contact     Please call your clinic at [phone] to:    Ask questions about your health    Make or cancel appointments    Discuss your medicines    Learn about your test results    Speak to your doctor   If you have compliments or concerns about an experience at your clinic, or if you wish to file a complaint, please contact [facility] Patient Relations at [phone] or email us at [email or web address]         Additional Information About Your Visit        MyChart Information     [employer] gives you secure access to your electronic health record. If you see a primary care provider, you can also send messages to your care team and make appointments. If you have questions, please call your primary care clinic.  If you do not have a primary care provider, please call [phone] and they will assist you.      [employer] is an electronic gateway that provides easy, online access to your medical records. With [employer] you can request a clinic appointment, read your test results, renew a prescription or communicate with your care team.     To access your existing account, please contact your  [facility] or call [phone] for assistance.        Care EveryWhere ID     This is your Care EveryWhere ID. This could be used by other organizations to access your [place] medical records  [id number]         Blood Pressure from Last 3 Encounters:   No data found for BP    Weight from Last 3 Encounters:   No data found for Wt              Today, you had the following     No orders found for display         Today's Medication Changes          These changes are accurate as of: 6/15/17 11:59 PM.  If you have any questions, ask your nurse or doctor.               These medicines have changed or have updated prescriptions.        Dose/Directions    * predniSONE 10 MG tablet   Commonly known as:  DELTASONE   This may have changed:  Another medication with the same name was added. Make sure you understand how and when to take each.   Used for:  AA (alopecia areata)   Changed by:  Tobias Murray MD        Take 5 tablets by mouth daily for 5 days, then 4 tabs for 5 days; 3 tabs for 5 days; 2 tabs for 5 days; 1 tab for 10 days, then stop.   Quantity:  80 tablet   Refills:  0       * predniSONE 10 MG tablet   Commonly known as:  DELTASONE   This may have changed:  Another medication with the same name was added. Make sure you understand how and when to take each.   Used for:  AA (alopecia areata)   Changed by:  Tobias Murray MD        Take 5 pills by mouth for 7 days, then 4 pills by mouth for 7 days, then 3 pills by mouth for 7 days.   Quantity:  84 tablet   Refills:  0       * predniSONE 10 MG tablet   Commonly known as:  DELTASONE   This may have changed:  You were already taking a medication with the same name, and this prescription was added. Make sure you understand how and when to take each.   Used for:  Alopecia areata   Changed by:  Tobias Murray MD        Take 3.5 tablets by mouth daily for 2 weeks, then decrease to 3 tablets by mouth daily for 2 weeks.   Quantity:  91  tablet   Refills:  0       * Notice:  This list has 3 medication(s) that are the same as other medications prescribed for you. Read the directions carefully, and ask your doctor or other care provider to review them with you.         Where to get your medicines      These medications were sent to [facility] [address] [place] [address] AT [facility]  [address] [place] [address]     Phone:  [phone]     clobetasol propionate 0.05 % Sham    fexofenadine 180 MG tablet    predniSONE 10 MG tablet                Primary Care Provider Office Phone # Fax #    Alta Zapata [phone]       [facility]    [address] [facility] [address] [address]  [facility] [address]        Equal Access to Services     YEISON ROMERO AH: Hadii mildred jiango Yousif, waaxda luqadaha, qaybta kaalmada ademaryyaalbania, rachel tierney. [facility] [phone].    ATENCIÓN: Si habla español, tiene a reyes disposición servicios gratuitos de asistencia lingüística. [facility] [phone].    We comply with applicable federal civil rights laws and [place] laws. We do not discriminate on the basis of race, color, national origin, age, disability sex, sexual orientation or gender identity.            Thank you!     Thank you for choosing [facility] DERMATOLOGY  for your care. Our goal is always to provide you with excellent care. Hearing back from our patients is one way we can continue to improve our services. Please take a few minutes to complete the written survey that you may receive in the mail after your visit with us. Thank you!             Your Updated Medication List - Protect others around you: Learn how to safely use, store and throw away your medicines at www.disposemymeds.org.          This list is accurate as of: 6/15/17 11:59 PM.  Always use your most recent med list.                   Brand Name Dispense Instructions for use Diagnosis    clobetasol propionate  0.05 % Sham     1 Bottle    Apply sparingly to dry scalp once daily as needed.  Leave in place for 15 minutes then add water, lather and rinse thoroughly.    AA (alopecia areata)       fexofenadine 180 MG tablet    ALLEGRA    90 tablet    Take 1 tablet (180 mg) by mouth daily    AA (alopecia areata)       * fluocinonide 0.05 % cream    LIDEX    60 g    Apply twice daily to affected areas    AA (alopecia areata)       * fluocinonide 0.05 % solution    LIDEX    60 mL    Apply topically 2 times daily to the scalp    AA (alopecia areata)       KARIVA 0.15-0.02/0.01 MG () per tablet   Generic drug:  desogestrel-ethinyl estradiol     28 tablet    TAKE 1 TABLET BY MOUTH EVERY DAY    Encounter for contraceptive management, unspecified contraceptive encounter type       PRE-ZAID PO      Take by mouth daily    AA (alopecia areata)       * predniSONE 10 MG tablet    DELTASONE    80 tablet    Take 5 tablets by mouth daily for 5 days, then 4 tabs for 5 days; 3 tabs for 5 days; 2 tabs for 5 days; 1 tab for 10 days, then stop.    AA (alopecia areata)       * predniSONE 10 MG tablet    DELTASONE    84 tablet    Take 5 pills by mouth for 7 days, then 4 pills by mouth for 7 days, then 3 pills by mouth for 7 days.    AA (alopecia areata)       * predniSONE 10 MG tablet    DELTASONE    91 tablet    Take 3.5 tablets by mouth daily for 2 weeks, then decrease to 3 tablets by mouth daily for 2 weeks.    Alopecia areata       * Notice:  This list has 5 medication(s) that are the same as other medications prescribed for you. Read the directions carefully, and ask your doctor or other care provider to review them with you.

## 2017-06-15 NOTE — PROGRESS NOTES
DERMATOLOGY FOLLOW-UP VISIT  Evonne 15, 2017  Last visit: 5/25/17    DERMATOLOGY PROBLEM LIST  1. Alopecia areata    S: 26F returning for follow-up, last seen 3 weeks ago for management of a diffuse acute scalp flare, at which time her prednisone taper was slowed.    She returns today reporting increased thinning globally on her scalp, but that it appears to be slowing. She continues to deny scalp symptoms, and reports no loss of eyebrows, lashes, or body hair elsewhere. She continues iron/Vit D/Zn supplementation, and has recently started applying essential oils to her scalp.    ROS:  CONST: otherwise well, no fevers, chills  SKIN: No new/changing moles  LYMPH: No subcutaneous lumps/bumps    O:  There were no vitals taken for this visit.  GEN: Pleasant, comfortable  SKIN: Focused scalp exam:  - worsening global thinning of the scalp, with multiple completely alopecic foci, plurifocally positive hair pull testing, focal appreciable regrowth on the L paramedian frontal scalp with diffuse follicular pinpoint fibers elsewhere, and thinly scattered nonadherent yellow-white scaling. No scarring.  - irregular superficial pitting of multiple fingernails  - eyebrows, lashes within normal limits     Labs: Reviewed in EPIC, including baseline CBC with differential, CMP, fasting lipid profile, quantiferon TB gold, HIV/Hep B core IgM/Hep B core IgG/Hep B sAG/Hep C Ab.    A/P:  1. Alopecia areata, severe flare with ongoing clinical activity which appears to be slowing, now at 30 mg prednisone, coping well psychologically. She reports intermittent palpitations and mild insomnia with 50mg daily, o/w no prednisone side effects. GAYATHRI inhibition screening labs from last visit were acceptable; she has not completed patient assistance program paperwork, an understands that the role of this sort of systemic therapy during acute flares is very unclear.  - reviewed etiology, unpredictable natural history.  - increase prednisone to 35 mg  daily for 2 week, then 30 mg daily for 2 weeks.  - reviewed oral GAYATHRI inhibition, and the uncertainty w/r/t efficacy during acute flares, disease modification, and long term safety.  - continue ketoconazole 2% shampoo TIW (renewed), alternated with clobetasol 0.05% shampoo (renewed)  - OK to continue resume oral fexofenadine 180 mg daily (case reports of efficacy in AA, mostly from [place])  - OK to continue essential oils, iron/Vit D/Zn supplementation    RTC 2 weeks.    Discussed and examined with [facility] dermatology chair Dr. Andreia Edwards.    Tobias Murray MD, RODRIGUE  PGY-4, Dermatology

## 2017-06-15 NOTE — LETTER
6/15/2017       RE: Sarah A Riedell  [address]  [place] [address]     Dear Colleague,    Thank you for referring your patient, Sarah A Riedell, to the [facility] DERMATOLOGY at [facility]. Please see a copy of my visit note below.    DERMATOLOGY FOLLOW-UP VISIT  Evonne 15, 2017  Last visit: 5/25/17    DERMATOLOGY PROBLEM LIST  1. Alopecia areata    S: 26F returning for follow-up, last seen 3 weeks ago for management of a diffuse acute scalp flare, at which time her prednisone taper was slowed.    She returns today reporting increased thinning globally on her scalp, but that it appears to be slowing. She continues to deny scalp symptoms, and reports no loss of eyebrows, lashes, or body hair elsewhere. She continues iron/Vit D/Zn supplementation, and has recently started applying essential oils to her scalp.    ROS:  CONST: otherwise well, no fevers, chills  SKIN: No new/changing moles  LYMPH: No subcutaneous lumps/bumps    O:  There were no vitals taken for this visit.  GEN: Pleasant, comfortable  SKIN: Focused scalp exam:  - worsening global thinning of the scalp, with multiple completely alopecic foci, plurifocally positive hair pull testing, focal appreciable regrowth on the L paramedian frontal scalp with diffuse follicular pinpoint fibers elsewhere, and thinly scattered nonadherent yellow-white scaling. No scarring.  - irregular superficial pitting of multiple fingernails  - eyebrows, lashes within normal limits     Labs: Reviewed in EPIC, including baseline CBC with differential, CMP, fasting lipid profile, quantiferon TB gold, HIV/Hep B core IgM/Hep B core IgG/Hep B sAG/Hep C Ab.    A/P:  1. Alopecia areata, severe flare with ongoing clinical activity which appears to be slowing, now at 30 mg prednisone, coping well psychologically. She reports intermittent palpitations and mild insomnia with 50mg daily, o/w no prednisone side effects. GAYATHRI inhibition screening  labs from last visit were acceptable; she has not completed patient assistance program paperwork, an understands that the role of this sort of systemic therapy during acute flares is very unclear.  - reviewed etiology, unpredictable natural history.  - increase prednisone to 35 mg daily for 2 week, then 30 mg daily for 2 weeks.  - reviewed oral GAYATHRI inhibition, and the uncertainty w/r/t efficacy during acute flares, disease modification, and long term safety.  - continue ketoconazole 2% shampoo TIW (renewed), alternated with clobetasol 0.05% shampoo (renewed)  - OK to continue resume oral fexofenadine 180 mg daily (case reports of efficacy in AA, mostly from [place])  - OK to continue essential oils, iron/Vit D/Zn supplementation    RTC 2 weeks.    Discussed and examined with [facility] dermatology chair Dr. Andreia Edwards.    Tobias Murray MD, RODRIGUE  PGY-4, Dermatology

## 2017-06-15 NOTE — NURSING NOTE
"Dermatology Rooming Note    Sarah A Riedell's goals for this visit include:   Chief Complaint   Patient presents with     Derm Problem     Lucrecia comes to clinic today for AA. States \"I've shed more but I am losing less than I was.\"     Madhavi Quinteros, [facility]    "

## 2017-06-16 NOTE — TELEPHONE ENCOUNTER
Have not heard further from pt or [employer] program, but per chart notes from 6/15 appt pt has not submitted her portion of pt assistance radha yet.

## 2017-06-16 NOTE — TELEPHONE ENCOUNTER
Prior Authorization Retail Medication Request  Medication/Dose: clobetasol prop (Temovate) 0.05% shampoo  Diagnosis and ICD code: AA (alopecia areata) (L63.9)  New/Renewal/Insurance Change PA: Renewal - ins change  Previously Tried and Failed Therapies: Pt on med with good success for 2 yrs; ILK injections, prednisone burst, Lidex, ketoconazole, fexofenadine    Insurance ID (if provided): [id number]  Insurance Phone (if provided): [phone]

## 2017-06-21 NOTE — TELEPHONE ENCOUNTER
PA Initiation    Medication: clobetasol prop (Temovate) 0.05% shampoo  Insurance Company: [employer] - Phone [phone] Fax [phone]  Pharmacy Filling the Rx: [facility] [address] [place] [address]  Filling Pharmacy Phone: [phone]  Filling Pharmacy Fax: [phone]  Start Date: 6/21/2017

## 2017-06-22 NOTE — TELEPHONE ENCOUNTER
Prior Authorization Approval    Authorization Effective Date: 5/21/2017  Authorization Expiration Date: 6/21/2018  Medication: clobetasol prop (Temovate) 0.05% shampoo - approved  Approved Dose/Quantity:   Reference #: CMM key# [id number]   Insurance Company: [employer] - Phone [phone] Fax [phone]  Expected CoPay: $250.44     CoPay Card Available:      Foundation Assistance Needed:    Which Pharmacy is filling the prescription (Not needed for infusion/clinic administered): [facility] [address] [place] [address] AT [facility]  Pharmacy Notified: Yes  Patient Notified: Yes    High co-pay is because of pt's deductible

## 2017-06-29 ENCOUNTER — OFFICE VISIT (OUTPATIENT)
Dept: DERMATOLOGY | Facility: CLINIC | Age: 27
End: 2017-06-29

## 2017-06-29 DIAGNOSIS — M35.9 AUTOIMMUNE DISEASE (H): ICD-10-CM

## 2017-06-29 DIAGNOSIS — L63.9 AA (ALOPECIA AREATA): Primary | ICD-10-CM

## 2017-06-29 DIAGNOSIS — L60.3 ONYCHODYSTROPHY: ICD-10-CM

## 2017-06-29 RX ORDER — PREDNISONE 10 MG/1
TABLET ORAL
Qty: 53 TABLET | Refills: 0 | Status: SHIPPED | OUTPATIENT
Start: 2017-06-29 | End: 2017-08-24

## 2017-06-29 ASSESSMENT — PAIN SCALES - GENERAL: PAINLEVEL: NO PAIN (0)

## 2017-06-29 NOTE — PROGRESS NOTES
DERMATOLOGY FOLLOW-UP VISIT  June 30, 2017  Last visit: 6/15/17    DERMATOLOGY PROBLEM LIST  1. Alopecia areata    S: 26F returning for follow-up, last seen 2  weeks ago for management of a diffuse acute scalp flare this Spring after longstanding localized disease, at which time her prednisone taper was slowed.    She returns today at 35 mg of prednisone reporting slowed hair loss, but only patchy foci of regrowth. She continues to deny scalp symptoms, and reports no loss of eyebrows, lashes, or body hair elsewhere. She continues iron/Vit D/Zn supplementation, essential oil application, and has recently started DHEAS supplementation at the direction of Dr. Bustamante of [facility]    ROS:  CONST: otherwise well, no fevers, chills  SKIN: No new/changing moles  LYMPH: No subcutaneous lumps/bumps    O:  There were no vitals taken for this visit.  GEN: Pleasant, comfortable  SKIN: Focused scalp exam:  - stable global thinning of the scalp, with multiple completely alopecic foci, positive hair pull testing over the ophiasis region, focal appreciable regrowth on the L paramedian frontal scalp with diffuse follicular pinpoint fibers elsewhere. No scarring.  - irregular superficial pitting of multiple fingernails  - eyebrows, lashes within normal limits     Labs: Reviewed in EPIC.    A/P:  1. Alopecia areata, severe flare with ongoing clinical activity which appears to be slowing, now at 35 mg prednisone after her taper was extended. Coping well psychologically. Other than mild insomnia she is experiencing, no prednisone side effects. GAYATHRI inhibition screening labs from last visit were acceptable, but she is not interested in pursuing this presently.  - reviewed etiology, unpredictable natural history.  - continue prednisone then 30 mg daily for 2 weeks then begin taper  - reviewed oral GAYATHRI inhibition, and the uncertainty w/r/t efficacy during acute flares, disease modification, and long term safety.  - repeat ILK 10  to approximately 30 sites in the scalp, total 3.0 cc  - continue ketoconazole 2% shampoo TIW (renewed), alternated with clobetasol 0.05% shampoo (renewed)  - OK to continue resume oral fexofenadine 180 mg daily (case reports of efficacy in AA, mostly from [place])  - OK to continue essential oils, iron/Vit D/Zn supplementation    RTC 6-8 weeks.    Discussed and examined with [facility] dermatology chair Dr. Andreia Edwards.    Tobias Murray MD, RODRIGUE  PGY-4, Dermatology    Patient was seen and examined with the dermatology resident. I agree with the history, review of systems, physical examination, assessments and plan. I was present for the key portion of the ILK injections. Over 50% of the visit was spent in consultation about her medications and risk/benefit of the JAK inhibitor tofacitinib.    Andreia Edwards MD  Professor and  Chair  Department of Dermatology  [facility]

## 2017-06-29 NOTE — LETTER
6/29/2017       RE: Sarah A Riedell  [address]  [place] [address]     Dear Colleague,    Thank you for referring your patient, Sarah A Riedell, to the [facility] DERMATOLOGY at [facility]. Please see a copy of my visit note below.    DERMATOLOGY FOLLOW-UP VISIT  June 30, 2017  Last visit: 6/15/17    DERMATOLOGY PROBLEM LIST  1. Alopecia areata    S: 26F returning for follow-up, last seen 2  weeks ago for management of a diffuse acute scalp flare this Spring after longstanding localized disease, at which time her prednisone taper was slowed.    She returns today at 35 mg of prednisone reporting slowed hair loss, but only patchy foci of regrowth. She continues to deny scalp symptoms, and reports no loss of eyebrows, lashes, or body hair elsewhere. She continues iron/Vit D/Zn supplementation, essential oil application, and has recently started DHEAS supplementation at the direction of Dr. Bustamante of [facility]    ROS:  CONST: otherwise well, no fevers, chills  SKIN: No new/changing moles  LYMPH: No subcutaneous lumps/bumps    O:  There were no vitals taken for this visit.  GEN: Pleasant, comfortable  SKIN: Focused scalp exam:  - stable global thinning of the scalp, with multiple completely alopecic foci, positive hair pull testing over the ophiasis region, focal appreciable regrowth on the L paramedian frontal scalp with diffuse follicular pinpoint fibers elsewhere. No scarring.  - irregular superficial pitting of multiple fingernails  - eyebrows, lashes within normal limits     Labs: Reviewed in EPIC.    A/P:  1. Alopecia areata, severe flare with ongoing clinical activity which appears to be slowing, now at 35 mg prednisone after her taper was extended. Coping well psychologically. Other than mild insomnia she is experiencing, no prednisone side effects. GAYATHRI inhibition screening labs from last visit were acceptable, but she is not interested in pursuing  this presently.  - reviewed etiology, unpredictable natural history.  - continue prednisone then 30 mg daily for 2 weeks then begin taper  - reviewed oral GAYATHRI inhibition, and the uncertainty w/r/t efficacy during acute flares, disease modification, and long term safety.  - repeat ILK 10 to approximately 30 sites in the scalp, total 3.0 cc  - continue ketoconazole 2% shampoo TIW (renewed), alternated with clobetasol 0.05% shampoo (renewed)  - OK to continue resume oral fexofenadine 180 mg daily (case reports of efficacy in AA, mostly from [place])  - OK to continue essential oils, iron/Vit D/Zn supplementation    RTC 6-8 weeks.    Discussed and examined with [facility] dermatology chair Dr. Andreia Edwards.    Tobias Murray MD, RODRIGUE  PGY-4, Dermatology    Again, thank you for allowing me to participate in the care of your patient.      Sincerely,    Tobias Murray MD

## 2017-06-29 NOTE — PATIENT INSTRUCTIONS
Continue prednisone:  30 mg daily for 1 week  30-20 x1 week  20-10 x 1 week  10-5 for 1 week  5 for 1 weeks  2.5 for 1 week.  Then stop    Follow-up: 4-6.

## 2017-06-29 NOTE — NURSING NOTE
"Dermatology Rooming Note    Sarah A Riedell's goals for this visit include:   Chief Complaint   Patient presents with     Derm Problem     Lucrecia comes to clinic today for alopecia aerata. States \"it's about the same.\"     Madhavi Quinteros, [facility]    "

## 2017-06-29 NOTE — MR AVS SNAPSHOT
After Visit Summary   6/29/2017    Sarah A Riedell    MRN: [id number]           Patient Information     Date Of Birth          1990        Visit Information        Provider Department      6/29/2017 7:45 AM Tobias Murray MD [facility] Dermatology        Today's Diagnoses     AA (alopecia areata)    -  1      Care Instructions    Continue prednisone:  30 mg daily for 1 week  30-20 x1 week  20-10 x 1 week  10-5 for 1 week  5 for 1 weeks  2.5 for 1 week.  Then stop    Follow-up: 4-6.            Follow-ups after your visit        Your next 10 appointments already scheduled     Aug 24, 2017  8:45 AM CDT   (Arrive by 8:30 AM)   Return Visit with Danae Fall MD   [facility] Dermat[facility] ([facility] [place])    [address] [place] [id number] [phone]              Who to contact     Please call your clinic at [phone] to:    Ask questions about your health    Make or cancel appointments    Discuss your medicines    Learn about your test results    Speak to your doctor   If you have compliments or concerns about an experience at your clinic, or if you wish to file a complaint, please contact [facility] Patient Relations at [phone] or email us at [email or web address]         Additional Information About Your Visit        MyChart Information     hoccer gives you secure access to your electronic health record. If you see a primary care provider, you can also send messages to your care team and make appointments. If you have questions, please call your primary care clinic.  If you do not have a primary care provider, please call [phone] and they will assist you.      hoccer is an electronic gateway that provides easy, online access to your medical records. With hoccer, you can request a clinic appointment, read your test results, renew a prescription or communicate with your care team.     To access your  existing account, please contact your [facility] or call [phone] for assistance.        Care EveryWhere ID     This is your Care EveryWhere ID. This could be used by other organizations to access your [place] medical records  [id number]         Blood Pressure from Last 3 Encounters:   05/25/17 122/80   10/02/15 106/69   12/04/14 100/76    Weight from Last 3 Encounters:   10/02/15 71.4 kg (157 lb 6.4 oz)   12/04/14 71.8 kg (158 lb 6.4 oz)   06/12/14 71.7 kg (158 lb)              Today, you had the following     No orders found for display       Primary Care Provider Office Phone # Fax #    Alta Zapata [phone]       [facility] [place]    [address]  [place]                [place] [address]        Equal Access to Services     [facility]: Hadii aad ku hadasho Yousif, waaxda luqadaha, qaybta kaalmada adeegyada, waxay idiin hayaan pepe mann . So [facility] [phone].    ATENCIÓN: Si habla español, tiene a reyes disposición servicios gratuitos de asistencia lingüística. Leigh al [phone].    We comply with applicable federal civil rights laws and [place] laws. We do not discriminate on the basis of race, color, national origin, age, disability sex, sexual orientation or gender identity.            Thank you!     Thank you for choosing [facility] DERMATOLOGY  for your care. Our goal is always to provide you with excellent care. Hearing back from our patients is one way we can continue to improve our services. Please take a few minutes to complete the written survey that you may receive in the mail after your visit with us. Thank you!             Your Updated Medication List - Protect others around you: Learn how to safely use, store and throw away your medicines at www.disposemymeds.org.          This list is accurate as of: 6/29/17  8:36 AM.  Always use your most recent med list.                   Brand Name Dispense Instructions for  use Diagnosis    clobetasol propionate 0.05 % Sham     1 Bottle    Apply sparingly to dry scalp once daily as needed.  Leave in place for 15 minutes then add water, lather and rinse thoroughly.    AA (alopecia areata)       fexofenadine 180 MG tablet    ALLEGRA    90 tablet    Take 1 tablet (180 mg) by mouth daily    AA (alopecia areata)       * fluocinonide 0.05 % cream    LIDEX    60 g    Apply twice daily to affected areas    AA (alopecia areata)       * fluocinonide 0.05 % solution    LIDEX    60 mL    Apply topically 2 times daily to the scalp    AA (alopecia areata)       KARIVA 0.15-0.02/0.01 MG () per tablet   Generic drug:  desogestrel-ethinyl estradiol     28 tablet    TAKE 1 TABLET BY MOUTH EVERY DAY    Encounter for contraceptive management, unspecified contraceptive encounter type       PRE-ZAID PO      Take by mouth daily    AA (alopecia areata)       * predniSONE 10 MG tablet    DELTASONE    80 tablet    Take 5 tablets by mouth daily for 5 days, then 4 tabs for 5 days; 3 tabs for 5 days; 2 tabs for 5 days; 1 tab for 10 days, then stop.    AA (alopecia areata)       * predniSONE 10 MG tablet    DELTASONE    84 tablet    Take 5 pills by mouth for 7 days, then 4 pills by mouth for 7 days, then 3 pills by mouth for 7 days.    AA (alopecia areata)       * predniSONE 10 MG tablet    DELTASONE    91 tablet    Take 3.5 tablets by mouth daily for 2 weeks, then decrease to 3 tablets by mouth daily for 2 weeks.    Alopecia areata       * Notice:  This list has 5 medication(s) that are the same as other medications prescribed for you. Read the directions carefully, and ask your doctor or other care provider to review them with you.

## 2017-07-12 NOTE — TELEPHONE ENCOUNTER
Spoke to [employer] program. Pt still hasn't submitted her application portion - they spoke to her and she wants to wait. States she will speak to clinic about other options. Case being put on hold.

## 2017-07-29 PROBLEM — L60.3 ONYCHODYSTROPHY: Status: ACTIVE | Noted: 2017-07-29

## 2017-08-24 ENCOUNTER — OFFICE VISIT (OUTPATIENT)
Dept: DERMATOLOGY | Facility: CLINIC | Age: 27
End: 2017-08-24

## 2017-08-24 DIAGNOSIS — E61.1 IRON DEFICIENCY: ICD-10-CM

## 2017-08-24 DIAGNOSIS — L63.9 AA (ALOPECIA AREATA): Primary | ICD-10-CM

## 2017-08-24 DIAGNOSIS — L63.9 AA (ALOPECIA AREATA): ICD-10-CM

## 2017-08-24 LAB
FERRITIN SERPL-MCNC: 21 NG/ML (ref 12–150)
IRON SATN MFR SERPL: 36 % (ref 15–46)
IRON SERPL-MCNC: 139 UG/DL (ref 35–180)
TIBC SERPL-MCNC: 385 UG/DL (ref 240–430)

## 2017-08-24 ASSESSMENT — PAIN SCALES - GENERAL
PAINLEVEL: MILD PAIN (2)
PAINLEVEL: NO PAIN (0)

## 2017-08-24 NOTE — MR AVS SNAPSHOT
After Visit Summary   8/24/2017    Sarah A Riedell    MRN: [id number]           Patient Information     Date Of Birth          1990        Visit Information        Provider Department      8/24/2017 8:45 AM Danae Fall MD [facility] Dermatology         Follow-ups after your visit        Your next 10 appointments already scheduled     Aug 24, 2017  9:15 AM CDT   LAB with  LAB   [facility] ([facility])    [address] [place] [address] [phone]           Patient must bring picture ID. Patient should be prepared to give a urine specimen  Please do not eat 10-12 hours before your appointment if you are coming in fasting for labs on lipids, cholesterol, or glucose (sugar). Pregnant women should follow their Care Team instructions. Water with medications is okay. Do not drink coffee or other fluids. If you have concerns about taking  your medications, please ask at office or if scheduling via [employer]town[employer], send a message by clicking on Secure Messaging, Message Your Care Team.            Sep 21, 2017  7:45 AM CDT   (Arrive by 7:30 AM)   RETURN HAIRLOSS with Danae Fall MD   [facility] Dermatology ([facility])    [address] [place] [address] [phone]              Who to contact     Please call your clinic at [phone] to:    Ask questions about your health    Make or cancel appointments    Discuss your medicines    Learn about your test results    Speak to your doctor   If you have compliments or concerns about an experience at your clinic, or if you wish to file a complaint, please contact [facility] Patient Relations at [phone] or email us at [email or web address]         Additional Information About Your Visit        Flow[employer] Information     [employer] gives you secure access to your electronic health record. If you see a primary care  provider, you can also send messages to your care team and make appointments. If you have questions, please call your primary care clinic.  If you do not have a primary care provider, please call [phone] and they will assist you.      [employer] is an electronic gateway that provides easy, online access to your medical records. With [employer], you can request a clinic appointment, read your test results, renew a prescription or communicate with your care team.     To access your existing account, please contact your [facility] or call [phone] for assistance.        Care EveryWhere ID     This is your Care EveryWhere ID. This could be used by other organizations to access your [place] medical records  [id number]         Blood Pressure from Last 3 Encounters:   05/25/17 122/80   10/02/15 106/69   12/04/14 100/76    Weight from Last 3 Encounters:   10/02/15 71.4 kg (157 lb 6.4 oz)   12/04/14 71.8 kg (158 lb 6.4 oz)   06/12/14 71.7 kg (158 lb)              Today, you had the following     No orders found for display         Today's Medication Changes          These changes are accurate as of: 8/24/17  9:09 AM.  If you have any questions, ask your nurse or doctor.               Stop taking these medicines if you haven't already. Please contact your care team if you have questions.     fluocinonide 0.05 % cream   Commonly known as:  LIDEX   Stopped by:  Danae Fall MD           fluocinonide 0.05 % solution   Commonly known as:  LIDEX   Stopped by:  Danae Fall MD           predniSONE 10 MG tablet   Commonly known as:  DELTASONE   Stopped by:  Danae Fall MD                    Primary Care Provider Office Phone # Fax #    Alta Zapata [phone] [address] [facility] [address]  [facility] [address]        Equal Access to Services     YEISON ROMERO AH: Moses Dodd, waaxda luqadaha, qaybta kaalmarachel roach  hermilo mann ah. Mihaela [facility] [phone].    ATENCIÓN: Si connor lester, tiene a reyes disposición servicios gratuitos de asistencia lingüística. Leigh myers [phone].    We comply with applicable federal civil rights laws and [place] laws. We do not discriminate on the basis of race, color, national origin, age, disability sex, sexual orientation or gender identity.            Thank you!     Thank you for choosing [facility] DERMATOLOGY  for your care. Our goal is always to provide you with excellent care. Hearing back from our patients is one way we can continue to improve our services. Please take a few minutes to complete the written survey that you may receive in the mail after your visit with us. Thank you!             Your Updated Medication List - Protect others around you: Learn how to safely use, store and throw away your medicines at www.disposemymeds.org.          This list is accurate as of: 17  9:09 AM.  Always use your most recent med list.                   Brand Name Dispense Instructions for use Diagnosis    clobetasol propionate 0.05 % Sham     1 Bottle    Apply sparingly to dry scalp once daily as needed.  Leave in place for 15 minutes then add water, lather and rinse thoroughly.    AA (alopecia areata)       fexofenadine 180 MG tablet    ALLEGRA    90 tablet    Take 1 tablet (180 mg) by mouth daily    AA (alopecia areata)       KARIVA 0.15-0.02/0.01 MG (21/5) per tablet   Generic drug:  desogestrel-ethinyl estradiol     28 tablet    TAKE 1 TABLET BY MOUTH EVERY DAY    Encounter for contraceptive management, unspecified contraceptive encounter type       PRE-ZAID PO      Take by mouth daily    AA (alopecia areata)

## 2017-08-24 NOTE — NURSING NOTE
Dermatology Rooming Note    Sarah A Riedell's goals for this visit include:   Chief Complaint   Patient presents with     Hair Loss     Alopecia Areata. Lucrecia notes slight re-growth. No new spots.     Sandy Dutton, CMA

## 2017-08-24 NOTE — NURSING NOTE
Drug Administration Record    Drug Name: triamcinolone acetonide(kenalog)  Dose: 3mL of triamcinolone 10mg/mL, 30mg dose  Route administered: ID  NDC #: Kenalog-10 ([address]-31)  Amount of waste(mL):2  Reason for waste: Single use vial

## 2017-08-24 NOTE — LETTER
8/24/2017       RE: Sarah A Riedell  [address]  [place] [address]     Dear Colleague,    Thank you for referring your patient, Sarah A Riedell, to the [facility] at [facility]. Please see a copy of my visit note below.    [facility] Dermatology Note      Dermatology Problem List:  1. Alopecia areata, previously focal patchy disease that responded well to ILK but around April 2017 developed diffuse shedding in context of recent IUD placement (~Nov 2016). IUD now removed.  - S/p prednisone course May-August 2017 with cessation of acute flare, now on ILK monthly  - Prior rx: clobetasol shampoo  - Labs 5/2017 with significant iron deficiency (ferritin 4), started on iron supplementation, repeat iron studies 8/24/2017 with improved ferritin to 22.    Encounter Date: Aug 24, 2017    CC:   Chief Complaint   Patient presents with     Hair Loss     Alopecia Areata. Lucrecia notes slight re-growth. No new spots.       History of Present Illness:  Ms. Sarah A Riedell is a 27 year old female who presents as a follow-up for alopecia areata. The patient was last seen 6/29/16 at which time her shedding had stopped and she was tapered off her prednisone. Since her last visit, has been doing about the same. Notes some more regrowth in a few areas. Continues to note a little shedding but not nearly as bad as before. Remainder of body hair still present but grows more slowly. Finished her course of prednisone with last dose on Sunday 8/20. Also saw Dr. Murray at his new clinic about a month ago and had injections there as well. He mentioned sulfasalazine as a possible treatment option. She otherwise is feeling well, no additional skin concerns today.    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    Family History:  Family History   Problem Relation Age of Onset     Asthma Mother      Hypertension Father      CANCER Paternal Grandmother      leukemia     Thyroid Disease Sister      Breast Cancer No family hx of      Melanoma No family hx of      Skin Cancer No family hx of        Medications:  Current Outpatient Prescriptions   Medication Sig Dispense Refill     fexofenadine (ALLEGRA) 180 MG tablet Take 1 tablet (180 mg) by mouth daily 90 tablet 3     KARIVA 0.15-0.02/0.01 MG () per tablet TAKE 1 TABLET BY MOUTH EVERY DAY 28 tablet 0     Prenatal Multivit-Min-Fe-FA (PRE- PO) Take by mouth daily       clobetasol propionate 0.05 % SHAM Apply sparingly to dry scalp once daily as needed.  Leave in place for 15 minutes then add water, lather and rinse thoroughly. (Patient not taking: Reported on 2017) 1 Bottle 3        Allergies   Allergen Reactions     No Clinical Screening - See Comments Unknown     duracef         Review of Systems:  -Constitutional: Otherwise in usual state of health.  -Skin: As above in HPI. No additional skin concerns.    Physical exam:  GEN: This is a well developed, well-nourished female in no acute distress, in a pleasant mood.    SKIN: Focused examination of the scalp, face, and nails was performed.  - On ~40-60% of scalp, there are clusters of 1-2 mm terminal fibers and occasionally multi-cm fibers. Several patches of complete alopecia remain. Minimal scale and folliculitis present. Hair pull test of long fibers negative. Several atrophic indentations present.  - Normal eyebrows/eyelash density.  - Nails with scattered pitting.  - No other lesions of concern on areas examined.      Impression/Plan:  1. Alopecia areata, previously with acute flare but now stabilized following course of prednisone and with some growth on exam. Will plan to continue ILK as she has seen improvement with this. Also discussed ezetimibe/simvastatin and sulfasalazine as options, literature on both provided. She will let us know if she would like to start these.    Kenalog intralesional injection procedure note: After verbal consent and discussion of risks including but not limited to atrophy, pain, and bruising, time out was performed, the patient underwent positioning and 3 total cc of Kenalog 10 mg/cc was injected into approximately 30 locations on the scalp.  The patient tolerated the procedure well and left the Dermatology clinic in good condition.      Continue ketoconazole shampoo.    Continue to hold clobetasol shampoo.    Okay to continue fexofenadine 180 mg daily (case reports of efficacy in AA, mostly from [place])    Patient to call if interest if ezetimibe/simvastatin or sulfasalazine.    2. Iron deficiency. Last ferritin was 4 in 5/2017, patient currently taking one iron tab daily.    Recheck ferritin reassuringly improved to 21 today.      Follow-up in 4 weeks, earlier for new or changing lesions.     Dr. Andreia Edwards staffed the patient.    Staff Involved:  Resident(Danae Fall)/Staff(as above)    Patient was seen and examined with the medicine/dermatology resident. I agree with the history, review of systems, physical examination, assessments and plan. I was present for the key portion of the ILK procedure.    Andreia Edwards MD  Professor and  Chair  Department of Dermatology  [facility]    Pictures were placed in Pt's chart today for future reference.

## 2017-08-24 NOTE — PROGRESS NOTES
[facility] Dermatology Note      Dermatology Problem List:  1. Alopecia areata, previously focal patchy disease that responded well to ILK but around April 2017 developed diffuse shedding in context of recent IUD placement (~Nov 2016). IUD now removed.  - S/p prednisone course May-August 2017 with cessation of acute flare, now on ILK monthly  - Prior rx: clobetasol shampoo  - Labs 5/2017 with significant iron deficiency (ferritin 4), started on iron supplementation, repeat iron studies 8/24/2017 with improved ferritin to 22.    Encounter Date: Aug 24, 2017    CC:   Chief Complaint   Patient presents with     Hair Loss     Alopecia Areata. Lucrecia notes slight re-growth. No new spots.       History of Present Illness:  Ms. Sarah A Riedell is a 27 year old female who presents as a follow-up for alopecia areata. The patient was last seen 6/29/16 at which time her shedding had stopped and she was tapered off her prednisone. Since her last visit, has been doing about the same. Notes some more regrowth in a few areas. Continues to note a little shedding but not nearly as bad as before. Remainder of body hair still present but grows more slowly. Finished her course of prednisone with last dose on Sunday 8/20. Also saw Dr. Murray at his new clinic about a month ago and had injections there as well. He mentioned sulfasalazine as a possible treatment option. She otherwise is feeling well, no additional skin concerns today.    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    Family History:  Family History   Problem Relation Age of Onset     Asthma Mother      Hypertension Father      CANCER Paternal Grandmother      leukemia     Thyroid Disease Sister      Breast Cancer No family hx of      Melanoma No family hx of      Skin Cancer No family hx of        Medications:  Current Outpatient Prescriptions   Medication Sig Dispense Refill     fexofenadine (ALLEGRA) 180 MG tablet Take 1 tablet (180 mg) by mouth daily 90 tablet 3     KARIVA 0.15-0.02/0.01 MG (/) per tablet TAKE 1 TABLET BY MOUTH EVERY DAY 28 tablet 0     Prenatal Multivit-Min-Fe-FA (PRE-ZAID PO) Take by mouth daily       clobetasol propionate 0.05 % SHAM Apply sparingly to dry scalp once daily as needed.  Leave in place for 15 minutes then add water, lather and rinse thoroughly. (Patient not taking: Reported on 2017) 1 Bottle 3        Allergies   Allergen Reactions     No Clinical Screening - See Comments Unknown     duracef         Review of Systems:  -Constitutional: Otherwise in usual state of health.  -Skin: As above in HPI. No additional skin concerns.    Physical exam:  GEN: This is a well developed, well-nourished female in no acute distress, in a pleasant mood.    SKIN: Focused examination of the scalp, face, and nails was performed.  - On ~40-60% of scalp, there are clusters of 1-2 mm terminal fibers and occasionally multi-cm fibers. Several patches of complete alopecia remain. Minimal scale and folliculitis present. Hair pull test of long fibers negative. Several atrophic indentations present.  - Normal eyebrows/eyelash density.  - Nails with scattered pitting.  - No other lesions of concern on areas examined.     Impression/Plan:  1. Alopecia areata, previously with acute flare but now stabilized following course of prednisone and with some growth on exam. Will plan to continue ILK as she has seen improvement with this. Also discussed ezetimibe/simvastatin and sulfasalazine as options, literature  on both provided. She will let us know if she would like to start these.    Kenalog intralesional injection procedure note: After verbal consent and discussion of risks including but not limited to atrophy, pain, and bruising, time out was performed, the patient underwent positioning and 3 total cc of Kenalog 10 mg/cc was injected into approximately 30 locations on the scalp.  The patient tolerated the procedure well and left the Dermatology clinic in good condition.      Continue ketoconazole shampoo.    Continue to hold clobetasol shampoo.    Okay to continue fexofenadine 180 mg daily (case reports of efficacy in AA, mostly from [place])    Patient to call if interest if ezetimibe/simvastatin or sulfasalazine.    2. Iron deficiency. Last ferritin was 4 in 5/2017, patient currently taking one iron tab daily.    Recheck ferritin reassuringly improved to 21 today.      Follow-up in 4 weeks, earlier for new or changing lesions.     Dr. Andreia Edwards staffed the patient.    Staff Involved:  Resident(Danae Fall)/Staff(as above)    Patient was seen and examined with the medicine/dermatology resident. I agree with the history, review of systems, physical examination, assessments and plan. I was present for the key portion of the ILK procedure.    Andreia Edwards MD  Professor and  Chair  Department of Dermatology  [facility]

## 2017-09-21 ENCOUNTER — OFFICE VISIT (OUTPATIENT)
Dept: DERMATOLOGY | Facility: CLINIC | Age: 27
End: 2017-09-21

## 2017-09-21 DIAGNOSIS — L63.9 ALOPECIA AREATA: Primary | ICD-10-CM

## 2017-09-21 DIAGNOSIS — D50.8 OTHER IRON DEFICIENCY ANEMIA: ICD-10-CM

## 2017-09-21 ASSESSMENT — PAIN SCALES - GENERAL
PAINLEVEL: NO PAIN (0)
PAINLEVEL: MILD PAIN (2)

## 2017-09-21 NOTE — LETTER
9/21/2017       RE: Sarah A Riedell  [address]  [facility] [address]     Dear Colleague,    Thank you for referring your patient, Sarah A Riedell, to the [facility] DERMATOLOGY at [facility]. Please see a copy of my visit note below.    Pictures were placed in Pt's chart today for future reference.              [facility] Dermatology Note      Dermatology Problem List:  1. Alopecia areata, previously focal patchy disease that responded well to ILK but around April 2017 developed diffuse shedding in context of recent IUD placement (~Nov 2016). IUD now removed.  - S/p prednisone course May-August 2017 with cessation of acute flare, now on ILK monthly  - Current rx: ketoconazole shampoo  - Prior rx: clobetasol shampoo  - Labs 5/2017 with significant iron deficiency (ferritin 4), started on iron supplementation, repeat iron studies 8/24/2017 with improved ferritin to 22.    Encounter Date: Sep 21, 2017    CC:   Chief Complaint   Patient presents with     Hair Loss     Alopecia Areata. Lucrecia notes hairloss on her body.       History of Present Illness:  Ms. Sarah A Riedell is a 27 year old female who presents as a follow-up for alopecia areata. The patient was last seen 8/24/17 at which time she underwent 3 cc of ILK. She was continued on ketoconazole shampoo but advised to continue holding clobetasol shampoo.    Since her last visit, she has been doing about the same and maybe a little worse. Notes that some of the longer hairs that seem to want to fall out have continued to fall out. She is noted some small fibers but they are not growing as quickly as she might expect. Also notes hair loss in bikini line and from nose. She is otherwise feeling well. Continues the fexofenadine daily. Currently not using the ketoconazole shampoo but plans to resume this. No additional skin concerns.    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 Lives near the [place].    Family History:  Family History   Problem Relation Age of Onset     Asthma Mother      Hypertension Father      CANCER Paternal Grandmother      leukemia     Thyroid Disease Sister      Breast Cancer No family hx of      Melanoma No family hx of      Skin Cancer No family hx of        Medications:  Current Outpatient Prescriptions   Medication Sig Dispense Refill     NONFORMULARY Please dispense one wig prosthesis. 1 each 3     clobetasol propionate 0.05 % SHAM Apply sparingly to dry scalp once daily as needed.  Leave in place for 15 minutes then add water, lather and rinse thoroughly. 1 Bottle 3     fexofenadine (ALLEGRA) 180 MG tablet Take 1 tablet (180 mg) by mouth daily 90 tablet 3     KARIVA 0.15-0.02/0.01 MG (21/5) per tablet TAKE 1 TABLET BY MOUTH EVERY DAY 28 tablet 0     Prenatal Multivit-Min-Fe-FA (PRE-ZAID PO) Take by mouth daily          Allergies   Allergen Reactions     No Clinical Screening - See Comments Unknown     duracef         Review of Systems:  -Constitutional: Otherwise in usual state of health.  -Skin: As above in HPI. No additional skin concerns.    Physical exam:  GEN: This is a well developed, well-nourished female in no acute distress, in a pleasant mood.    SKIN: Focused examination of the scalp, face, and nails was performed.  - On ~40-60% of scalp, there are clusters of 1-2 mm vellus and terminal fibers and occasionally multi-cm fibers. Several patches of complete alopecia remain. Minimal scale and folliculitis present. Hair pull test of long fibers negative. Several  atrophic indentations present. In comparison to prior exam, there is stable density of the small clusters of vellus/terminal fibers but prior longer fibers noted have fallen out.  - Normal eyebrow density. Eyelashes mostly present but with few missing fibers.  - Nails with scattered pitting.  - No other lesions of concern on areas examined.     Impression/Plan:  1. Alopecia areata, previously with acute flare but now stabilized following course of prednisone and continues to have some growth on exam. In comparison to last month, exam is overall with continued small fibers at sites of ILK. Today we will continue plan without changes. Again discussed other options include ezetimibe/simvastatin and sqauric acid but at this time patient would like to continue with ILK as she is noting some improvement.     Kenalog intralesional injection procedure note: After verbal consent and discussion of risks including but not limited to atrophy, pain, and bruising, time out was performed, the patient underwent positioning and the area was prepped with isopropyl alcohol, 3 total cc of Kenalog 10 mg/cc was injected into approximately 30 locations on the scalp.  The patient tolerated the procedure well and left the Dermatology clinic in good condition.      Resume ketoconazole shampoo.    Continue to hold clobetasol shampoo.    Okay to continue fexofenadine 180 mg daily (case reports of efficacy in AA, mostly from [place])    Ezetimibe/simvastatin and squaric acid discussed. Patient will let us know if she is interested.    2. Iron deficiency. Last ferritin was 4 in 5/2017, patient currently taking one iron tab daily.    Recheck ferritin reassuringly improved to 21 on 8/24/17. Continue iron supplementation.      Follow-up in 4 weeks, sooner if concerns.    Dr. Lisa Desouza staffed the patient.    Staff Involved:  Resident(Danae Fall)/Staff(as above)    I have personally examined this patient and agree with Dr. Fall's  documentation and plan of care. I have reviewed and amended the resident's note above. The documentation accurately reflects my clinical observations, diagnoses, treatment and follow-up plans. I was present for key portions of the procedure.       Lisa Desouza MD  Dermatology Staff

## 2017-09-21 NOTE — NURSING NOTE
Drug Administration Record    Drug Name: triamcinolone acetonide(kenalog)  Dose: 3mL of triamcinolone 10mg/mL, 30mg dose  Route administered: ID  NDC #: Kenalog-10 ([address]-98)  Amount of waste(mL):2  Reason for waste: Single use vial

## 2017-09-21 NOTE — MR AVS SNAPSHOT
After Visit Summary   9/21/2017    Sarah A Riedell    MRN: [id number]           Patient Information     Date Of Birth          1990        Visit Information        Provider Department      9/21/2017 7:45 AM Danae Fall MD [facility] Dermatology        Today's Diagnoses     Alopecia areata    -  1    Other iron deficiency anemia          Care Instructions    We will continue with injections today.    Another option to think about is squaric acid. This is a topical application at home that causes irritation and confuses the immune system. If you ever would like to try this, please let us know.    Return in 1 month, sooner if concerns.          Follow-ups after your visit        Follow-up notes from your care team     Return in about 1 month (around 10/21/2017).      Your next 10 appointments already scheduled     Oct 26, 2017 10:45 AM CDT   (Arrive by 10:30 AM)   RETURN HAIRLOSS with Flores Avalos MD   [facility] Dermatology ([facility])    [address] [place] [id number] [phone]            Nov 30, 2017  8:45 AM CST   (Arrive by 8:30 AM)   RETURN HAIRLOSS with Flores Avalos MD   [facility] Dermatology ([facility])    [address] [place] [id number] [phone]              Who to contact     Please call your clinic at [phone] to:    Ask questions about your health    Make or cancel appointments    Discuss your medicines    Learn about your test results    Speak to your doctor   If you have compliments or concerns about an experience at your clinic, or if you wish to file a complaint, please contact [facility] Patient Relations at [phone] or email us at [email or web address]         Additional Information About Your Visit        MyChart Information     [email or web address] gives you secure access to your electronic health record. If you see a  primary care provider, you can also send messages to your care team and make appointments. If you have questions, please call your primary care clinic.  If you do not have a primary care provider, please call [phone] and they will assist you.      [employer] is an electronic gateway that provides easy, online access to your medical records. With [employer], you can request a clinic appointment, read your test results, renew a prescription or communicate with your care team.     To access your existing account, please contact your [facility] or call [phone] for assistance.        Care EveryWhere ID     This is your Care EveryWhere ID. This could be used by other organizations to access your [facility] medical records  [id number]         Blood Pressure from Last 3 Encounters:   05/25/17 122/80   10/02/15 106/69   12/04/14 100/76    Weight from Last 3 Encounters:   10/02/15 157 lb 6.4 oz (71.4 kg)   12/04/14 158 lb 6.4 oz (71.8 kg)   06/12/14 158 lb (71.7 kg)              We Performed the Following     INJECTION INTO SKIN LESIONS >7          Today's Medication Changes          These changes are accurate as of: 9/21/17 11:59 PM.  If you have any questions, ask your nurse or doctor.               Start taking these medicines.        Dose/Directions    NONFORMULARY   Used for:  Alopecia areata   Started by:  Danae Fall MD        Please dispense one wig prosthesis.   Quantity:  1 each   Refills:  3       triamcinolone acetonide 10 MG/ML injection   Commonly known as:  KENALOG   Used for:  Alopecia areata   Started by:  Danae Fall MD        Dose:  10 mg   Inject 1 mL (10 mg) into the skin once for 1 dose   Quantity:  3 mL   Refills:  0            Where to get your medicines      Some of these will need a paper prescription and others can be bought over the counter.  Ask your nurse if you have questions.     Bring a paper prescription for each of these medications      NONFORMULARY       You don't need a prescription for these medications     triamcinolone acetonide 10 MG/ML injection                Primary Care Provider Office Phone # Fax #    Alta ESTIVEN Zapata [phone] [address]  [place]                [place] [address]        Equal Access to Services     [facility] CenterESTIVEN : Hadii aad ku hadasho Someeali, waaxda luqadaha, qaybta kaalmada pepeyada, waxmarito negreten pepe rousemarybethkamlesh grantn . So [facility] [phone].    ATENCIÓN: Si habla español, tiene a reyes disposición servicios gratuitos de asistencia lingüística. Ll[facility] [phone].    We comply with applicable federal civil rights laws and [place] laws. We do not discriminate on the basis of race, color, national origin, age, disability, sex, sexual orientation, or gender identity.            Thank you!     Thank you for choosing [facility] DERMATOLOGY  for your care. Our goal is always to provide you with excellent care. Hearing back from our patients is one way we can continue to improve our services. Please take a few minutes to complete the written survey that you may receive in the mail after your visit with us. Thank you!             Your Updated Medication List - Protect others around you: Learn how to safely use, store and throw away your medicines at www.disposemymeds.org.          This list is accurate as of: 9/21/17 11:59 PM.  Always use your most recent med list.                   Brand Name Dispense Instructions for use Diagnosis    clobetasol propionate 0.05 % Sham     1 Bottle    Apply sparingly to dry scalp once daily as needed.  Leave in place for 15 minutes then add water, lather and rinse thoroughly.    AA (alopecia areata)       fexofenadine 180 MG tablet    ALLEGRA    90 tablet    Take 1 tablet (180 mg) by mouth daily    AA (alopecia areata)       KARIVA 0.15-0.02/0.01 MG (21/5) per tablet   Generic drug:  desogestrel-ethinyl estradiol     28 tablet    TAKE 1 TABLET BY MOUTH EVERY  DAY    Encounter for contraceptive management, unspecified contraceptive encounter type       NONFORMULARY     1 each    Please dispense one wig prosthesis.    Alopecia areata       PRE-ZAID PO      Take by mouth daily    AA (alopecia areata)       triamcinolone acetonide 10 MG/ML injection    KENALOG    3 mL    Inject 1 mL (10 mg) into the skin once for 1 dose    Alopecia areata

## 2017-09-21 NOTE — PATIENT INSTRUCTIONS
We will continue with injections today.    Another option to think about is squaric acid. This is a topical application at home that causes irritation and confuses the immune system. If you ever would like to try this, please let us know.    Return in 1 month, sooner if concerns.

## 2017-09-21 NOTE — NURSING NOTE
Dermatology Rooming Note    Sarah A Riedell's goals for this visit include:   Chief Complaint   Patient presents with     Hair Loss     Alopecia Areata. Lucrecia notes hairloss on her body.     Sandy Dutton, CMA

## 2017-09-23 NOTE — PROGRESS NOTES
[facility] Dermatology Note      Dermatology Problem List:  1. Alopecia areata, previously focal patchy disease that responded well to ILK but around April 2017 developed diffuse shedding in context of recent IUD placement (~Nov 2016). IUD now removed.  - S/p prednisone course May-August 2017 with cessation of acute flare, now on ILK monthly  - Current rx: ketoconazole shampoo  - Prior rx: clobetasol shampoo  - Labs 5/2017 with significant iron deficiency (ferritin 4), started on iron supplementation, repeat iron studies 8/24/2017 with improved ferritin to 22.    Encounter Date: Sep 21, 2017    CC:   Chief Complaint   Patient presents with     Hair Loss     Alopecia Areata. Lucrecia notes hairloss on her body.       History of Present Illness:  Ms. Sarah A Riedell is a 27 year old female who presents as a follow-up for alopecia areata. The patient was last seen 8/24/17 at which time she underwent 3 cc of ILK. She was continued on ketoconazole shampoo but advised to continue holding clobetasol shampoo.    Since her last visit, she has been doing about the same and maybe a little worse. Notes that some of the longer hairs that seem to want to fall out have continued to fall out. She is noted some small fibers but they are not growing as quickly as she might expect. Also notes hair loss in bikini line and from nose. She is otherwise feeling well. Continues the fexofenadine daily. Currently not using the ketoconazole shampoo but plans to resume this. No additional skin concerns.    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 Lives [place] the [place].    Family History:  Family History   Problem Relation Age of Onset     Asthma Mother      Hypertension Father      CANCER Paternal Grandmother      leukemia     Thyroid Disease Sister      Breast Cancer No family hx of      Melanoma No family hx of      Skin Cancer No family hx of        Medications:  Current Outpatient Prescriptions   Medication Sig Dispense Refill     NONFORMULARY Please dispense one wig prosthesis. 1 each 3     clobetasol propionate 0.05 % SHAM Apply sparingly to dry scalp once daily as needed.  Leave in place for 15 minutes then add water, lather and rinse thoroughly. 1 Bottle 3     fexofenadine (ALLEGRA) 180 MG tablet Take 1 tablet (180 mg) by mouth daily 90 tablet 3     KARIVA 0.15-0.02/0.01 MG (/5) per tablet TAKE 1 TABLET BY MOUTH EVERY DAY 28 tablet 0     Prenatal Multivit-Min-Fe-FA (PRE- PO) Take by mouth daily          Allergies   Allergen Reactions     No Clinical Screening - See Comments Unknown     duracef         Review of Systems:  -Constitutional: Otherwise in usual state of health.  -Skin: As above in HPI. No additional skin concerns.    Physical exam:  GEN: This is a well developed, well-nourished female in no acute distress, in a pleasant mood.    SKIN: Focused examination of the scalp, face, and nails was performed.  - On ~40-60% of scalp, there are clusters of 1-2 mm vellus and terminal fibers and occasionally multi-cm fibers. Several patches of complete alopecia remain. Minimal scale and folliculitis present. Hair pull test of long fibers negative. Several atrophic indentations present. In comparison to prior exam, there is stable density of the small clusters of vellus/terminal fibers but prior longer fibers noted have fallen out.  - Normal eyebrow density. Eyelashes mostly present but with few missing fibers.  - Nails with scattered pitting.  - No other lesions of concern on areas examined.      Impression/Plan:  1. Alopecia areata, previously with acute flare but now stabilized following course of prednisone and continues to have some growth on exam. In comparison to last month, exam is overall with continued small fibers at sites of ILK. Today we will continue plan without changes. Again discussed other options include ezetimibe/simvastatin and sqauric acid but at this time patient would like to continue with ILK as she is noting some improvement.     Kenalog intralesional injection procedure note: After verbal consent and discussion of risks including but not limited to atrophy, pain, and bruising, time out was performed, the patient underwent positioning and the area was prepped with isopropyl alcohol, 3 total cc of Kenalog 10 mg/cc was injected into approximately 30 locations on the scalp.  The patient tolerated the procedure well and left the Dermatology clinic in good condition.      Resume ketoconazole shampoo.    Continue to hold clobetasol shampoo.    Okay to continue fexofenadine 180 mg daily (case reports of efficacy in AA, mostly from [place])    Ezetimibe/simvastatin and squaric acid discussed. Patient will let us know if she is interested.    2. Iron deficiency. Last ferritin was 4 in 5/2017, patient currently taking one iron tab daily.    Recheck ferritin reassuringly improved to 21 on 8/24/17. Continue iron supplementation.      Follow-up in 4 weeks, sooner if concerns.    Dr. Lisa Desouza staffed the patient.    Staff Involved:  Resident(Danae Fall)/Staff(as above)    I have personally examined this patient and agree with Dr. Fall's documentation and plan of care. I have reviewed and amended the resident's note above. The documentation accurately reflects my clinical observations, diagnoses, treatment and follow-up plans. I was present for key portions of the procedure.       Lisa Desouza MD  Dermatology Staff

## 2017-10-26 ENCOUNTER — OFFICE VISIT (OUTPATIENT)
Dept: DERMATOLOGY | Facility: CLINIC | Age: 27
End: 2017-10-26

## 2017-10-26 DIAGNOSIS — L63.9 ALOPECIA AREATA: Primary | ICD-10-CM

## 2017-10-26 RX ORDER — TACROLIMUS 1 MG/G
OINTMENT TOPICAL 2 TIMES DAILY
Qty: 60 G | Refills: 0 | Status: SHIPPED | OUTPATIENT
Start: 2017-10-26 | End: 2018-07-05

## 2017-10-26 ASSESSMENT — PAIN SCALES - GENERAL
PAINLEVEL: NO PAIN (0)
PAINLEVEL: NO PAIN (1)

## 2017-10-26 NOTE — MR AVS SNAPSHOT
After Visit Summary   10/26/2017    Sarah A Riedell    MRN: [id number]           Patient Information     Date Of Birth          1990        Visit Information        Provider Department      10/26/2017 10:45 AM Flores Avalos MD [facility] Dermatology        Today's Diagnoses     Alopecia areata    -  1       Follow-ups after your visit        Your next 10 appointments already scheduled     Nov 30, 2017  8:45 AM CST   (Arrive by 8:30 AM)   RETURN HAIRLOSS with Flores Avalos MD   [facility] Dermatology ([facility])    [address] [place] [id number] [phone]            Jan 11, 2018  8:30 AM CST   (Arrive by 8:15 AM)   RETURN HAIRLOSS with Eduar Carter MD   [facility] Dermatology ([facility])    [address] [place] [id number] [phone]              Who to contact     Please call your clinic at [phone] to:    Ask questions about your health    Make or cancel appointments    Discuss your medicines    Learn about your test results    Speak to your doctor   If you have compliments or concerns about an experience at your clinic, or if you wish to file a complaint, please contact [facility] Patient Relations at [phone] or email us at [email or web address]         Additional Information About Your Visit        MyChart Information     [employer] gives you secure access to your electronic health record. If you see a primary care provider, you can also send messages to your care team and make appointments. If you have questions, please call your primary care clinic.  If you do not have a primary care provider, please call [phone] and they will assist you.      Whyteboard is an electronic gateway that provides easy, online access to your medical records. With [employer], you can request a clinic appointment, read your test results, renew a  prescription or communicate with your care team.     To access your existing account, please contact your [facility] or call [phone] for assistance.        Care EveryWhere ID     This is your Care EveryWhere ID. This could be used by other organizations to access your [facility] medical records  [id number]         Blood Pressure from Last 3 Encounters:   05/25/17 122/80   10/02/15 106/69   12/04/14 100/76    Weight from Last 3 Encounters:   10/02/15 71.4 kg (157 lb 6.4 oz)   12/04/14 71.8 kg (158 lb 6.4 oz)   06/12/14 71.7 kg (158 lb)              We Performed the Following     INJECTION INTO SKIN LESIONS >7          Today's Medication Changes          These changes are accurate as of: 10/26/17 11:59 PM.  If you have any questions, ask your nurse or doctor.               Start taking these medicines.        Dose/Directions    tacrolimus 0.1 % ointment   Commonly known as:  PROTOPIC   Used for:  Alopecia areata   Started by:  Flores Avalos MD        Apply topically 2 times daily To eyebrows for alopecia areata   Quantity:  60 g   Refills:  0       triamcinolone acetonide 10 MG/ML injection   Commonly known as:  KENALOG   Used for:  Alopecia areata   Started by:  Flores Avalos MD        Dose:  30 mg   Inject 3 mLs (30 mg) into the skin once for 1 dose   Quantity:  3 mL   Refills:  0            Where to get your medicines      These medications were sent to [facility] [address] [place] [address] AT [address] [address]    Hours:  24-hours Phone:  [phone]     tacrolimus 0.1 % ointment         Some of these will need a paper prescription and others can be bought over the counter.  Ask your nurse if you have questions.     You don't need a prescription for these medications     triamcinolone acetonide 10 MG/ML injection                Primary Care Provider Office Phone # Fax #    Alta Zapata [phone]  [phone]       [address] [facility] [address]  [facility] [address]        Equal Access to Services     YEISON ROMERO : Hadii mildred garcia amish Dodd, wasouravda maryurimaurice, maxine kaallegrada gee, rachel carlosin hayaascot parkermary ortega lacelenascot tierney. So [facility] [phone].    ATENCIÓN: Si habla español, tiene a reyes disposición servicios gratuitos de asistencia lingüística. Llame al [phone].    We comply with applicable federal civil rights laws and [place] laws. We do not discriminate on the basis of race, color, national origin, age, disability, sex, sexual orientation, or gender identity.            Thank you!     Thank you for choosing [facility] DERMATOLOGY  for your care. Our goal is always to provide you with excellent care. Hearing back from our patients is one way we can continue to improve our services. Please take a few minutes to complete the written survey that you may receive in the mail after your visit with us. Thank you!             Your Updated Medication List - Protect others around you: Learn how to safely use, store and throw away your medicines at www.disposemymeds.org.          This list is accurate as of: 10/26/17 11:59 PM.  Always use your most recent med list.                   Brand Name Dispense Instructions for use Diagnosis    clobetasol propionate 0.05 % Sham     1 Bottle    Apply sparingly to dry scalp once daily as needed.  Leave in place for 15 minutes then add water, lather and rinse thoroughly.    AA (alopecia areata)       fexofenadine 180 MG tablet    ALLEGRA    90 tablet    Take 1 tablet (180 mg) by mouth daily    AA (alopecia areata)       KARIVA 0.15-0.02/0.01 MG (21/5) per tablet   Generic drug:  desogestrel-ethinyl estradiol     28 tablet    TAKE 1 TABLET BY MOUTH EVERY DAY    Encounter for contraceptive management, unspecified contraceptive encounter type       NONFORMULARY     1 each    Please dispense one wig prosthesis.    Alopecia areata       PRE- PO      Take by  mouth daily    AA (alopecia areata)       tacrolimus 0.1 % ointment    PROTOPIC    60 g    Apply topically 2 times daily To eyebrows for alopecia areata    Alopecia areata       triamcinolone acetonide 10 MG/ML injection    KENALOG    3 mL    Inject 3 mLs (30 mg) into the skin once for 1 dose    Alopecia areata

## 2017-10-26 NOTE — NURSING NOTE
Dermatology Rooming Note    Sarah A Riedell's goals for this visit include:   Chief Complaint   Patient presents with     Derm Problem     Lucrecia is here today for a hairloss follow up. States it's improved in some areas, but has gotten worse in other areas.          Hina Brock, LPN

## 2017-10-26 NOTE — PROGRESS NOTES
[facility] Dermatology Note      Dermatology Problem List:  1. Alopecia areata, previously focal patchy disease that responded well to ILK but around April 2017 developed diffuse shedding in context of recent IUD placement (~Nov 2016). IUD now removed.  - S/p prednisone course May-August 2017 with cessation of acute flare, now on ILK monthly  - Current rx: ketoconazole shampoo, minoxidil, and tacrolimus for eyebrows  - Prior rx: clobetasol shampoo  - Labs 5/2017 with significant iron deficiency (ferritin 4), started on iron supplementation, repeat iron studies 8/24/2017 with improved ferritin to 22.    Encounter Date: Oct 26, 2017    CC:   Chief Complaint   Patient presents with     Derm Problem     Lucrecia is here today for a hairloss follow up. States it's improved in some areas, but has gotten worse in other areas.        History of Present Illness:  Ms. Sarah A Riedell is a 27 year old female who presents as a follow-up for alopecia areata. The patient was last seen 9/21/17 at which time she underwent 3 cc of ILK. She was continued on ketoconazole shampoo, She has also continued the clobetasol shampoo twice per week       Since her last visit, she feels that she has had more patches of small fiber regrowth throughout her scalp. However, she has also noticed thinning of her eyebrows and eyelashes. She is using minoxidil for this, but has not noticed much difference.     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    Family History:  Family History   Problem  Relation Age of Onset     Asthma Mother      Hypertension Father      CANCER Paternal Grandmother      leukemia     Thyroid Disease Sister      Breast Cancer No family hx of      Melanoma No family hx of      Skin Cancer No family hx of        Medications:  Current Outpatient Prescriptions   Medication Sig Dispense Refill     NONFORMULARY Please dispense one wig prosthesis. 1 each 3     clobetasol propionate 0.05 % SHAM Apply sparingly to dry scalp once daily as needed.  Leave in place for 15 minutes then add water, lather and rinse thoroughly. 1 Bottle 3     fexofenadine (ALLEGRA) 180 MG tablet Take 1 tablet (180 mg) by mouth daily 90 tablet 3     KARIVA 0.15-0.02/0.01 MG (/) per tablet TAKE 1 TABLET BY MOUTH EVERY DAY 28 tablet 0     Prenatal Multivit-Min-Fe-FA (PRE- PO) Take by mouth daily          Allergies   Allergen Reactions     No Clinical Screening - See Comments Unknown     duracef         Review of Systems:  -Constitutional: Otherwise in usual state of health.  -Skin: As above in HPI. No additional skin concerns.    Physical exam:  GEN: This is a well developed, well-nourished female in no acute distress, in a pleasant mood.    SKIN: Focused examination of the scalp, face, and nails was performed.  - On ~40-60% of scalp, there are clusters of 1-2 mm vellus and terminal fibers and occasionally shailesh of multi-cm fibers. Several patches of complete alopecia remain. Several atrophic indentations present. In comparison to prior exam, there is increased density of the longer terminal fibers.   - Eyebrow density decreased especially in the lateral eyebrows, few scattered eyelashes.  - Nails with scattered pitting.  - No other lesions of concern on areas examined.     Impression/Plan:  1. Alopecia areata, previously with acute flare but now stabilized following course of prednisone and continues to have some growth on exam. Patients scalp has been improving, but she is now having loss of her eyebrows and  eyelashes. Again discussed other options include ezetimibe/simvastatin and sqauric acid but at this time patient would like to continue with ILK as she is noting some improvement.     Kenalog intralesional injection procedure note: After verbal consent and discussion of risks including but not limited to atrophy, pain, and bruising, time out was performed, the patient underwent positioning and the area was prepped with isopropyl alcohol, 3 total cc of Kenalog 10 mg/cc was injected into approximately 30 locations on the scalp.  The patient tolerated the procedure well and left the Dermatology clinic in good condition.      Continue ketoconazole shampoo.    Start tacrolimus to the eyebrows, discussed bimatoprost (advised against given risk of eye color change)    Continue to hold clobetasol shampoo.    Okay to continue fexofenadine 180 mg daily (case reports of efficacy in AA, mostly from [place])    Ezetimibe/simvastatin and squaric acid discussed. Patient will let us know if she is interested.    2. Iron deficiency. Last ferritin was 4 in 5/2017, patient currently taking one iron tab daily.    Recheck ferritin reassuringly improved to 21 on 8/24/17. Continue iron supplementation.    Follow-up in 4 weeks, sooner if concerns.    Lynne Reyna staffed the patient.    Staff Involved:  Resident(Lynne Garrett)/Staff(as above)    .I, Lynne Garrett MD, saw this patient with the resident and agree with the resident s findings and plan of care as documented in the resident s note.

## 2017-10-26 NOTE — NURSING NOTE
Drug Administration Record    Drug Name: triamcinolone acetonide(kenalog)  Dose: 3mL of triamcinolone 10mg/mL, 40mg dose  Route administered: ID  NDC #: Kenalog-10 ([address]-45)  Amount of waste(mL):2  Reason for waste: Single use vial

## 2017-10-26 NOTE — LETTER
10/26/2017       RE: Sarah A Riedell  [address]  [place] [facility] [address]     Dear Colleague,    Thank you for referring your patient, Sarah A Riedell, to the [facility] DERMATOLOGY at [facility]. Please see a copy of my visit note below.    [facility] Dermatology Note      Dermatology Problem List:  1. Alopecia areata, previously focal patchy disease that responded well to ILK but around April 2017 developed diffuse shedding in context of recent IUD placement (~Nov 2016). IUD now removed.  - S/p prednisone course May-August 2017 with cessation of acute flare, now on ILK monthly  - Current rx: ketoconazole shampoo, minoxidil, and tacrolimus for eyebrows  - Prior rx: clobetasol shampoo  - Labs 5/2017 with significant iron deficiency (ferritin 4), started on iron supplementation, repeat iron studies 8/24/2017 with improved ferritin to 22.    Encounter Date: Oct 26, 2017    CC:   Chief Complaint   Patient presents with     Derm Problem     Lucrecia is here today for a hairloss follow up. States it's improved in some areas, but has gotten worse in other areas.        History of Present Illness:  Ms. Sarah A Riedell is a 27 year old female who presents as a follow-up for alopecia areata. The patient was last seen 9/21/17 at which time she underwent 3 cc of ILK. She was continued on ketoconazole shampoo, She has also continued the clobetasol shampoo twice per week       Since her last visit, she feels that she has had more patches of small fiber regrowth throughout her scalp. However, she has also noticed thinning of her eyebrows and eyelashes. She is using minoxidil for this, but has not noticed much difference.     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    Family History:  Family History   Problem Relation Age of Onset     Asthma Mother      Hypertension Father      CANCER Paternal Grandmother      leukemia     Thyroid Disease Sister      Breast Cancer No family hx of      Melanoma No family hx of      Skin Cancer No family hx of        Medications:  Current Outpatient Prescriptions   Medication Sig Dispense Refill     NONFORMULARY Please dispense one wig prosthesis. 1 each 3     clobetasol propionate 0.05 % SHAM Apply sparingly to dry scalp once daily as needed.  Leave in place for 15 minutes then add water, lather and rinse thoroughly. 1 Bottle 3     fexofenadine (ALLEGRA) 180 MG tablet Take 1 tablet (180 mg) by mouth daily 90 tablet 3     KARIVA 0.15-0.02/0.01 MG (21/5) per tablet TAKE 1 TABLET BY MOUTH EVERY DAY 28 tablet 0     Prenatal Multivit-Min-Fe-FA (PRE- PO) Take by mouth daily          Allergies   Allergen Reactions     No Clinical Screening - See Comments Unknown     duracef         Review of Systems:  -Constitutional: Otherwise in usual state of health.  -Skin: As above in HPI. No additional skin concerns.    Physical exam:  GEN: This is a well developed, well-nourished female in no acute distress, in a pleasant mood.    SKIN: Focused examination of the scalp, face, and nails was performed.  - On ~40-60% of scalp, there are clusters of 1-2 mm vellus and terminal fibers and occasionally shailesh of multi-cm fibers. Several patches of complete alopecia remain. Several atrophic indentations present. In comparison to prior exam, there is increased density of the longer terminal fibers.   - Eyebrow density decreased especially in the lateral eyebrows, few scattered eyelashes.  - Nails with scattered pitting.  - No  other lesions of concern on areas examined.     Impression/Plan:  1. Alopecia areata, previously with acute flare but now stabilized following course of prednisone and continues to have some growth on exam. Patients scalp has been improving, but she is now having loss of her eyebrows and eyelashes. Again discussed other options include ezetimibe/simvastatin and sqauric acid but at this time patient would like to continue with ILK as she is noting some improvement.     Kenalog intralesional injection procedure note: After verbal consent and discussion of risks including but not limited to atrophy, pain, and bruising, time out was performed, the patient underwent positioning and the area was prepped with isopropyl alcohol, 3 total cc of Kenalog 10 mg/cc was injected into approximately 30 locations on the scalp.  The patient tolerated the procedure well and left the Dermatology clinic in good condition.      Continue ketoconazole shampoo.    Start tacrolimus to the eyebrows, discussed bimatoprost (advised against given risk of eye color change)    Continue to hold clobetasol shampoo.    Okay to continue fexofenadine 180 mg daily (case reports of efficacy in AA, mostly from [place])    Ezetimibe/simvastatin and squaric acid discussed. Patient will let us know if she is interested.    2. Iron deficiency. Last ferritin was 4 in 5/2017, patient currently taking one iron tab daily.    Recheck ferritin reassuringly improved to 21 on 8/24/17. Continue iron supplementation.    Follow-up in 4 weeks, sooner if concerns.    Lynne Reyna staffed the patient.    Staff Involved:  Resident(Lynne Garrett)/Staff(as above)    .I, Lynne Garrett MD, saw this patient with the resident and agree with the resident s findings and plan of care as documented in the resident s note.    Sincerely,    Flores Avalos MD

## 2017-11-30 ENCOUNTER — OFFICE VISIT (OUTPATIENT)
Dept: DERMATOLOGY | Facility: CLINIC | Age: 27
End: 2017-11-30

## 2017-11-30 DIAGNOSIS — L63.9 AA (ALOPECIA AREATA): Primary | ICD-10-CM

## 2017-11-30 DIAGNOSIS — L90.9 ATROPHY OF SKIN: ICD-10-CM

## 2017-11-30 RX ORDER — SIMVASTATIN 20 MG
40 TABLET ORAL AT BEDTIME
Qty: 60 TABLET | Refills: 4 | Status: SHIPPED | OUTPATIENT
Start: 2017-11-30 | End: 2018-06-11

## 2017-11-30 RX ORDER — CLOBETASOL PROPIONATE 0.05 G/100ML
SHAMPOO TOPICAL
Qty: 1 BOTTLE | Refills: 3 | Status: SHIPPED | OUTPATIENT
Start: 2017-11-30

## 2017-11-30 RX ORDER — EZETIMIBE 10 MG/1
10 TABLET ORAL DAILY
Qty: 90 TABLET | Refills: 3 | Status: SHIPPED | OUTPATIENT
Start: 2017-11-30 | End: 2018-06-11

## 2017-11-30 ASSESSMENT — PAIN SCALES - GENERAL: PAINLEVEL: NO PAIN (0)

## 2017-11-30 NOTE — LETTER
11/30/2017       RE: Sarah A Riedell  [address]  [facility] [address]     Dear Colleague,    Thank you for referring your patient, Sarah A Riedell, to the [facility] DERMATOLOGY at [facility]. Please see a copy of my visit note below.    [facility] Dermatology Note    Dermatology Problem List:  1. Alopecia areata, previously focal patchy disease that responded well to ILK but around April 2017 developed diffuse shedding in context of recent IUD placement (~Nov 2016). IUD now removed.  - S/p prednisone course May-August 2017 with cessation of acute flare, now on ILK monthly  - Current rx: simvastatin/ezetimibe, ketoconazole shampoo, minoxidil, and tacrolimus for eyebrows, clobetasol shampoo  - Labs 5/2017 with significant iron deficiency (ferritin 4), started on iron supplementation, repeat iron studies 8/24/2017 with improved ferritin to 22.    Encounter Date: Nov 30, 2017    CC:   Chief Complaint   Patient presents with     Hair Loss     Alopecia Areata. Lucrecia notes hair loss on her eyebrows and eye lashes.     History of Present Illness:  Ms. Sarah A Riedell is a 27 year old female who presents as a follow-up for alopecia areata. The patient was last seen 10/27/17 at which time she underwent 3 cc of ILK. She was continued on ketoconazole shampoo. She stopped the clobetasol shampoo since her last visit. She has continued to lose more of the hair from her eyebrows and eyelashes, as well as most of her body hair. She feels that her scalp is stable without significant regrowth apart from the prior shailesh that have been there for several months.         She has been doing research online and is interested in pursuing other treatment options including: stool transplant, simvastatin/ezetimibe, and tofacitinib (xeljanz).     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    Family History:  Family History   Problem Relation Age of Onset     Asthma Mother      Hypertension Father      CANCER Paternal Grandmother      leukemia     Thyroid Disease Sister      Breast Cancer No family hx of      Melanoma No family hx of      Skin Cancer No family hx of      Medications:  Current Outpatient Prescriptions   Medication Sig Dispense Refill     ezetimibe (ZETIA) 10 MG tablet Take 1 tablet (10 mg) by mouth daily 90 tablet 3     simvastatin (ZOCOR) 20 MG tablet Take 2 tablets (40 mg) by mouth At Bedtime 60 tablet 4     clobetasol propionate 0.05 % SHAM Apply sparingly to dry scalp once daily as needed.  Leave in place for 15 minutes then add water, lather and rinse thoroughly. 1 Bottle 3     tacrolimus (PROTOPIC) 0.1 % ointment Apply topically 2 times daily To eyebrows for alopecia areata 60 g 0     NONFORMULARY Please dispense one wig prosthesis. 1 each 3     fexofenadine (ALLEGRA) 180 MG tablet Take 1 tablet (180 mg) by mouth daily 90 tablet 3     KARIVA 0.15-0.02/0.01 MG (21/5) per tablet TAKE 1 TABLET BY MOUTH EVERY DAY 28 tablet 0     Prenatal Multivit-Min-Fe-FA (PRE- PO) Take by mouth daily          Allergies   Allergen Reactions     No Clinical Screening - See Comments Unknown     duracef     Review of Systems:  -Constitutional: Otherwise in usual state of health.  -Skin: As above in HPI. No additional skin concerns.    Physical exam:  GEN: This is a well developed, well-nourished female in no acute distress, in a pleasant mood.    SKIN: Focused examination of the scalp, face, and nails was performed.  - On ~20-30% of scalp,  there are clusters of 1-2 mm vellus and terminal fibers and occasionally shailesh of multi-cm fibers. Several patches of complete alopecia remain. Several atrophic indentations present. In comparison to prior exam, there is increased density of the longer terminal fibers.   - Now with only few vellus hairs in the eyebrows and no visible eyelashes  - Minimal arm hair with primarily scattered vellus hairs  - Nails with scattered pitting  - No other lesions of concern on areas examined.     Impression/Plan:  1. Alopecia areata, now with continued hairloss including eyebrows, eyelashes, and body hair. Long discussion today including simvastatin/ezitimibe, squaric acid, tofacitinib, and topical GAYATHRI kinase inhibitors. Discussed that at this time there is no available trial for stool transplant as a treatment for AA, though there is a trial for topical GAYATHRI kinase inhibitor (Dr. Wilburn in [place]).     Patient opted for trial of Simvastatin/Ezetimibe, prior lipid panel wnl (based on Tamara et al 2015 JAAD)    Okay to continue fexofenadine 180 mg daily (case reports of efficacy in AA, mostly from [place])    She opted to forgo ILK given ongoing atrophic areas on the scalp (improved from prior) and to resume clobetasol shampoo 2-3 times per week.     Patient will also look into the topical GAYATHRI kinase inhibitor trial Dr. Wilburn in [place]    Discussed that she could also try both prebiotics and probiotics which has been shown to alter the fecal microbiome in a positive way.     2. Iron deficiency. Last ferritin was 4 in 5/2017, patient currently taking one iron tab daily.    Continue iron supplementation.    Follow-up in 4 weeks for repeat ILK if desired, will cancel if she changes her mind and wants to hold off on ILK while she is on trial of Simvastatin/Ezetimibe.     Andreia Gomez staffed the patient.    Staff Involved:  Resident(Flores Avalos)/Staff(as above)        Pictures were placed in Pt's chart today for future  reference.                Again, thank you for allowing me to participate in the care of your patient.      Sincerely,    Flores Avalos MD

## 2017-11-30 NOTE — PATIENT INSTRUCTIONS
For Changing your Gut Mary  Pre-biotic and Probriotics    Options include:   Tofacitinib (Xeljanz)  There is also a topical GAYATHRI-kinase inhibitor (like Tofactinib) trial available through Dr. Wilburn in [place]   Simvastatin/Ezetemibe

## 2017-11-30 NOTE — NURSING NOTE
Dermatology Rooming Note    Sarah A Riedell's goals for this visit include:   Chief Complaint   Patient presents with     Hair Loss     Alopecia Areata. Lucrecia notes hair loss on her eyebrows and eye lashes.     Sandy Dutton, CMA

## 2017-11-30 NOTE — MR AVS SNAPSHOT
After Visit Summary   11/30/2017    Sarah A Riedell    MRN: [id number]           Patient Information     Date Of Birth          1990        Visit Information        Provider Department      11/30/2017 8:45 AM Flores Avalos MD [facility] Dermatology        Today's Diagnoses     AA (alopecia areata)    -  1      Care Instructions    For Changing your Gut Mary  Pre-biotic and Probriotics    Options include:   Tofacitinib (Xeljanz)  There is also a topical GAYATHRI-kinase inhibitor (like Tofactinib) trial available through Dr. Wilburn in [place]   Simvastatin/Ezetemibe             Follow-ups after your visit        Your next 10 appointments already scheduled     Jan 11, 2018  8:30 AM CST   (Arrive by 8:15 AM)   RETURN HAIRLOSS with Eduar Carter MD   [facility] Dermatology ([facility])    [address] [place] [id number] [phone]            Feb 22, 2018  8:00 AM CST   (Arrive by 7:45 AM)   RETURN HAIRLOSS with Eduar Carter MD   [facility] Dermatology [facility])    [address] [place] [id number] [phone]              Who to contact     Please call your clinic at [phone] to:    Ask questions about your health    Make or cancel appointments    Discuss your medicines    Learn about your test results    Speak to your doctor   If you have compliments or concerns about an experience at your clinic, or if you wish to file a complaint, please contact [facility] Patient Relations at [phone] or email us at [email or web address]         Additional Information About Your Visit        MyChart Information     [employer] gives you secure access to your electronic health record. If you see a primary care provider, you can also send messages to your care team and make appointments. If you have questions, please call your primary care clinic.  If you do  not have a primary care provider, please call [phone] and they will assist you.      [employer] is an electronic gateway that provides easy, online access to your medical records. With [employer], you can request a clinic appointment, read your test results, renew a prescription or communicate with your care team.     To access your existing account, please contact your [facility] or call [phone] for assistance.        Care EveryWhere ID     This is your Care EveryWhere ID. This could be used by other organizations to access your [facility] medical records  [id number]         Blood Pressure from Last 3 Encounters:   05/25/17 122/80   10/02/15 106/69   12/04/14 100/76    Weight from Last 3 Encounters:   10/02/15 71.4 kg (157 lb 6.4 oz)   12/04/14 71.8 kg (158 lb 6.4 oz)   06/12/14 71.7 kg (158 lb)              Today, you had the following     No orders found for display         Today's Medication Changes          These changes are accurate as of: 11/30/17  9:35 AM.  If you have any questions, ask your nurse or doctor.               Start taking these medicines.        Dose/Directions    ezetimibe 10 MG tablet   Commonly known as:  ZETIA   Used for:  AA (alopecia areata)   Started by:  Flores Avalos MD        Dose:  10 mg   Take 1 tablet (10 mg) by mouth daily   Quantity:  90 tablet   Refills:  3       simvastatin 20 MG tablet   Commonly known as:  ZOCOR   Used for:  AA (alopecia areata)   Started by:  Flores Avalos MD        Dose:  40 mg   Take 2 tablets (40 mg) by mouth At Bedtime   Quantity:  60 tablet   Refills:  4            Where to get your medicines      These medications were sent to [facility] [address] [place] [address] AT [address] [address]    Hours:  24-hours Phone:  [phone]     clobetasol propionate 0.05 % Sham    ezetimibe 10 MG tablet    simvastatin 20 MG tablet                Primary Care  Provider Office Phone # Fax #    Alta ESTIVEN Zapata [phone] [address] [address]        Equal Access to Services     YEISON HEATHER : Hadii aad ku apolinaro Mihaelacasimiro, waaxda luqadaha, qaybta kaalmada adevinicio, rachel grantscot kelsy. So [facility] [phone].    ATENCIÓN: Si habla español, tiene a reyes disposición servicios gratuitos de asistencia lingüística. Llame al [phone].    We comply with applicable federal civil rights laws and [place] laws. We do not discriminate on the basis of race, color, national origin, age, disability, sex, sexual orientation, or gender identity.            Thank you!     Thank you for choosing [facility] DERMATOLOGY  for your care. Our goal is always to provide you with excellent care. Hearing back from our patients is one way we can continue to improve our services. Please take a few minutes to complete the written survey that you may receive in the mail after your visit with us. Thank you!             Your Updated Medication List - Protect others around you: Learn how to safely use, store and throw away your medicines at www.disposemymeds.org.          This list is accurate as of: 11/30/17  9:35 AM.  Always use your most recent med list.                   Brand Name Dispense Instructions for use Diagnosis    clobetasol propionate 0.05 % Sham     1 Bottle    Apply sparingly to dry scalp once daily as needed.  Leave in place for 15 minutes then add water, lather and rinse thoroughly.    AA (alopecia areata)       ezetimibe 10 MG tablet    ZETIA    90 tablet    Take 1 tablet (10 mg) by mouth daily    AA (alopecia areata)       fexofenadine 180 MG tablet    ALLEGRA    90 tablet    Take 1 tablet (180 mg) by mouth daily    AA (alopecia areata)       KARIVA 0.15-0.02/0.01 MG (21/5) per tablet   Generic drug:  desogestrel-ethinyl estradiol     28 tablet    TAKE 1 TABLET BY MOUTH EVERY DAY    Encounter for  contraceptive management, unspecified contraceptive encounter type       NONFORMULARY     1 each    Please dispense one wig prosthesis.    Alopecia areata       PRE- PO      Take by mouth daily    AA (alopecia areata)       simvastatin 20 MG tablet    ZOCOR    60 tablet    Take 2 tablets (40 mg) by mouth At Bedtime    AA (alopecia areata)       tacrolimus 0.1 % ointment    PROTOPIC    60 g    Apply topically 2 times daily To eyebrows for alopecia areata    Alopecia areata

## 2017-12-01 NOTE — PROGRESS NOTES
[facility] Dermatology Note    Dermatology Problem List:  1. Alopecia areata, previously focal patchy disease that responded well to ILK but around April 2017 developed diffuse shedding in context of recent IUD placement (~Nov 2016). IUD now removed.  - S/p prednisone course May-August 2017 with cessation of acute flare, now on ILK monthly  - Current rx: simvastatin/ezetimibe, ketoconazole shampoo, minoxidil, and tacrolimus for eyebrows, clobetasol shampoo  - Labs 5/2017 with significant iron deficiency (ferritin 4), started on iron supplementation, repeat iron studies 8/24/2017 with improved ferritin to 22.    Encounter Date: Nov 30, 2017    CC:   Chief Complaint   Patient presents with     Hair Loss     Alopecia Areata. Lucrecia notes hair loss on her eyebrows and eye lashes.     History of Present Illness:  Ms. Sarah A Riedell is a 27 year old female who presents as a follow-up for alopecia areata. The patient was last seen 10/27/17 at which time she underwent 3 cc of ILK 10. She was continued on 2% ketoconazole shampoo. She stopped the clobetasol shampoo since her last visit. She has continued to lose more of the hair from her eyebrows and eyelashes, as well as most of her body hair. She feels that her scalp is stable without significant regrowth apart from the prior shailesh that have been there for several months.         She has been doing research online and is interested in pursuing other treatment options including: stool transplant, simvastatin/ezetimibe, and tofacitinib (xeljanz).     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    Family History:  Family History   Problem Relation Age of Onset     Asthma Mother      Hypertension Father      CANCER Paternal Grandmother      leukemia     Thyroid Disease Sister      Breast Cancer No family hx of      Melanoma No family hx of      Skin Cancer No family hx of      Medications:  Current Outpatient Prescriptions   Medication Sig Dispense Refill     ezetimibe (ZETIA) 10 MG tablet Take 1 tablet (10 mg) by mouth daily 90 tablet 3     simvastatin (ZOCOR) 20 MG tablet Take 2 tablets (40 mg) by mouth At Bedtime 60 tablet 4     clobetasol propionate 0.05 % SHAM Apply sparingly to dry scalp once daily as needed.  Leave in place for 15 minutes then add water, lather and rinse thoroughly. 1 Bottle 3     tacrolimus (PROTOPIC) 0.1 % ointment Apply topically 2 times daily To eyebrows for alopecia areata 60 g 0     NONFORMULARY Please dispense one wig prosthesis. 1 each 3     fexofenadine (ALLEGRA) 180 MG tablet Take 1 tablet (180 mg) by mouth daily 90 tablet 3     KARIVA 0.15-0.02/0.01 MG (/) per tablet TAKE 1 TABLET BY MOUTH EVERY DAY 28 tablet 0     Prenatal Multivit-Min-Fe-FA (PRE-ZAID PO) Take by mouth daily          Allergies   Allergen Reactions     No Clinical Screening - See Comments Unknown     duracef     Review of Systems:  -Constitutional: Otherwise in usual state of health.  -Skin: As above in HPI. No additional skin concerns.    Physical exam:  GEN: This is a well developed, well-nourished female in no acute distress, in a pleasant mood.    SKIN: Focused examination of the scalp, face, and nails was performed.  - On ~20-30% of scalp, there are clusters of 1-2 mm vellus and terminal fibers and occasionally shailesh of multi-cm fibers. Several patches of complete alopecia remain. Several atrophic indentations present. In comparison to prior exam, there is increased density of the longer terminal fibers.   - Now with only few  vellus hairs in the eyebrows and no visible eyelashes  - Minimal arm hair with primarily scattered vellus hairs  - Nails with scattered pitting  - No other lesions of concern on areas examined.     Impression/Plan:  1. Alopecia areata, now with continued hairloss including eyebrows, eyelashes, and body hair. Long discussion today including simvastatin/ezitimibe, squaric acid, tofacitinib, and topical GAYATHRI kinase inhibitors. Discussed that at this time there is no available trial for stool transplant as a treatment for AA, though there is a trial for topical GAYATHRI kinase inhibitor (Dr. Wilburn in [place]).     Patient opted for trial of Simvastatin/Ezetimibe, prior lipid panel wnl (based on Latsaroj et al 2015 JAAD)    Okay to continue fexofenadine 180 mg daily (case reports of efficacy in AA, mostly from [place])    She opted to forgo ILK given ongoing atrophic areas on the scalp (improved from prior) and to resume clobetasol shampoo 2-3 times per week.     Patient will also look into the topical GAYATHRI kinase inhibitor trial Dr. Wilburn in [place]    Discussed that she could also try both prebiotics and probiotics which has been shown to alter the fecal microbiome in a positive way.     2. Iron deficiency. Last ferritin was 4 in 5/2017, patient currently taking one iron tab daily.    Continue iron supplementation.    Follow-up in 4 weeks for repeat ILK if desired, will cancel if she changes her mind and wants to hold off on ILK while she is on trial of Simvastatin/Ezetimibe.     Andreia Gomez staffed the patient.    Staff Involved:  Resident(Flores Avalos)/Staff(as above)    Patient was seen and examined with the medicine/dermatology resident. I agree with the history, review of systems, physical examination, assessments and plan.    Andreia Edwards MD  Professor and  Chair  Department of Dermatology  [facility]

## 2018-01-04 RX ORDER — DESOGESTREL AND ETHINYL ESTRADIOL 21-5 (28)
1 KIT ORAL DAILY
Qty: 28 TABLET | Refills: 0 | Status: CANCELLED | OUTPATIENT
Start: 2018-01-04

## 2018-01-04 NOTE — TELEPHONE ENCOUNTER
Requested Prescriptions   Pending Prescriptions Disp Refills     desogestrel-ethinyl estradiol (KARIVA) 0.15-0.02/0.01 MG (21/5) per tablet 28 tablet 0    Last Written Prescription Date:  12/15/16  Last Fill Quantity: 28 tablet,  # refills: 0   Last Office Visit with [facility], P or [facility] prescribing provider:  10/02/15 [place]   Future Office Visit:      Sig: Take 1 tablet by mouth daily    Contraceptives Protocol Failed    1/4/2018  1:59 PM       Failed - Recent or future visit with authorizing provider's specialty    Patient had office visit in the last year or has a visit in the next 30 days with authorizing provider.  See chart review.              Passed - Patient is not a current smoker if age is 35 or older       Passed - No active pregnancy on record       Passed - No positive pregnancy test in past 12 months            *Pt overdue for OV

## 2018-01-05 NOTE — TELEPHONE ENCOUNTER
Sent MyChart message to pt  Last OV 10/2015  Last Rx sent 12/2016 - break in med  Denied refill request   Appt needed  Jossie ZAVALA RN

## 2018-02-04 ENCOUNTER — HEALTH MAINTENANCE LETTER (OUTPATIENT)
Age: 28
End: 2018-02-04

## 2018-06-11 ENCOUNTER — TELEPHONE (OUTPATIENT)
Dept: DERMATOLOGY | Facility: CLINIC | Age: 28
End: 2018-06-11

## 2018-06-11 DIAGNOSIS — L63.9 AA (ALOPECIA AREATA): ICD-10-CM

## 2018-06-11 RX ORDER — FEXOFENADINE HCL 180 MG/1
180 TABLET ORAL DAILY
Qty: 90 TABLET | Refills: 1 | Status: SHIPPED | OUTPATIENT
Start: 2018-06-11

## 2018-06-11 RX ORDER — EZETIMIBE 10 MG/1
10 TABLET ORAL DAILY
Qty: 90 TABLET | Refills: 1 | Status: SHIPPED | OUTPATIENT
Start: 2018-06-11

## 2018-06-11 RX ORDER — SIMVASTATIN 20 MG
40 TABLET ORAL AT BEDTIME
Qty: 180 TABLET | Refills: 1 | Status: SHIPPED | OUTPATIENT
Start: 2018-06-11

## 2018-06-11 NOTE — TELEPHONE ENCOUNTER
zocor  Last Written Prescription Date:  11/30/18  Last Fill Quantity: 60   # refills: 4  allegra  Last Written Prescription Date:  6/15/17  Last Fill Quantity: 90   # refills: 3  zetia    Last Written Prescription Date:  11/30/17  Last Fill Quantity: 90,   # refills: 3  Last Office Visit :11/20/17  Future Office visit:  No future appt    Routing refill request to provider for review/approval because:  Pt has new pharmacy. Scheduling has been notified to contact the pt for appointment.

## 2018-06-11 NOTE — TELEPHONE ENCOUNTER
Received refill request for Zocor, Allegra, and Zetia. Patient last seen in clinic 11/30/18 and is due for follow up. Will provide short term refill and schedule patient for follow up.    Timmy Duarte MD  Dermatology resident, PGY-4  [phone]

## 2018-06-11 NOTE — TELEPHONE ENCOUNTER
Writer left message to call back to schedule a return visit with Dr. Edwards. Contact information was provided in the message. Fabby Mcgill LPN

## 2018-06-11 NOTE — TELEPHONE ENCOUNTER
----- Message from Jovita Fair LPN sent at 6/11/2018 11:46 AM CDT -----  Regarding: FW: appt      ----- Message -----     From: Sammie Mitchell RN     Sent: 6/11/2018  10:57 AM       To: [facility]  Subject: appt                                             Please call to schedule.    Thanks    Due for RTC     I do not need any follow up on the scheduling of this appointment unless the patient will no longer be receiving care in our clinic.

## 2018-06-12 NOTE — TELEPHONE ENCOUNTER
Attempted to reach patient. No answer. Patient is due for follow up. Routing to [facility] for assistance.

## 2018-07-05 ENCOUNTER — OFFICE VISIT (OUTPATIENT)
Dept: DERMATOLOGY | Facility: CLINIC | Age: 28
End: 2018-07-05
Payer: COMMERCIAL

## 2018-07-05 DIAGNOSIS — L63.9 AA (ALOPECIA AREATA): Primary | ICD-10-CM

## 2018-07-05 ASSESSMENT — PAIN SCALES - GENERAL: PAINLEVEL: NO PAIN (0)

## 2018-07-05 NOTE — NURSING NOTE
"Dermatology Rooming Note    Sarah A Riedell's goals for this visit include:   Chief Complaint   Patient presents with     Hair Loss     Lucrecia is here today for a hair loss follow up. Lucrecia notes\" I have not seen any improvement\"     Raiza Paz, RMROHAN    "

## 2018-07-05 NOTE — MR AVS SNAPSHOT
After Visit Summary   7/5/2018    Sarah A Riedell    MRN: [id number]           Patient Information     Date Of Birth          1990        Visit Information        Provider Department      7/5/2018 10:00 AM Flores Avalos MD M [facility]        Today's Diagnoses     AA (alopecia areata)    -  1       Follow-ups after your visit        Your next 10 appointments already scheduled     Sep 06, 2018  8:00 AM CDT   (Arrive by 7:45 AM)   Return Visit with MD CARRIE Christianson [facility] ([facility])    [address] [place] [id number] [phone]              Who to contact     Please call your clinic at [phone] to:    Ask questions about your health    Make or cancel appointments    Discuss your medicines    Learn about your test results    Speak to your doctor            Additional Information About Your Visit        MyChart Information     [employer] gives you secure access to your electronic health record. If you see a primary care provider, you can also send messages to your care team and make appointments. If you have questions, please call your primary care clinic.  If you do not have a primary care provider, please call [phone] and they will assist you.      [employer] is an electronic gateway that provides easy, online access to your medical records. With [employer], you can request a clinic appointment, read your test results, renew a prescription or communicate with your care team.     To access your existing account, please contact your [facility] or call [phone] for assistance.        Care EveryWhere ID     This is your Care EveryWhere ID. This could be used by other organizations to access your [place] medical records  [id number]         Blood Pressure from Last 3 Encounters:   05/25/17 122/80   10/02/15 106/69   12/04/14 100/76    Weight from Last 3 Encounters:   10/02/15 157  lb 6.4 oz (71.4 kg)   12/04/14 158 lb 6.4 oz (71.8 kg)   06/12/14 158 lb (71.7 kg)              Today, you had the following     No orders found for display       Primary Care Provider Office Phone # Fax #    Alta JEAN-BAPTISTE MD Benny [phone] [address] [facility] [address]  [place]                [place] [address]        Equal Access to Services     [facility]: Hadii aad ku hadasho Someeali, waaxda luqadaha, qaybta kaalmada adeegyada, waxay idiin hayaan adeeg kharash lacelenan . So [facility] [phone].    ATENCIÓN: Si connor lester, tiene a reyes disposición servicios gratuitos de asistencia lingüística. Llame al [phone].    We comply with applicable federal civil rights laws and [place] laws. We do not discriminate on the basis of race, color, national origin, age, disability, sex, sexual orientation, or gender identity.            Thank you!     Thank you for choosing [facility]  for your care. Our goal is always to provide you with excellent care. Hearing back from our patients is one way we can continue to improve our services. Please take a few minutes to complete the written survey that you may receive in the mail after your visit with us. Thank you!             Your Updated Medication List - Protect others around you: Learn how to safely use, store and throw away your medicines at www.disposemymeds.org.          This list is accurate as of 7/5/18 11:59 PM.  Always use your most recent med list.                   Brand Name Dispense Instructions for use Diagnosis    clobetasol propionate 0.05 % Sham     1 Bottle    Apply sparingly to dry scalp once daily as needed.  Leave in place for 15 minutes then add water, lather and rinse thoroughly.    AA (alopecia areata)       ezetimibe 10 MG tablet    ZETIA    90 tablet    Take 1 tablet (10 mg) by mouth daily    AA (alopecia areata)       fexofenadine 180 MG tablet    ALLEGRA    90 tablet    Take 1 tablet (180 mg) by mouth daily    AA (alopecia areata)        KARIVA 0.15-0.02/0.01 MG () per tablet   Generic drug:  desogestrel-ethinyl estradiol     28 tablet    TAKE 1 TABLET BY MOUTH EVERY DAY    Encounter for contraceptive management, unspecified contraceptive encounter type       NONFORMULARY     1 each    Please dispense one wig prosthesis.    Alopecia areata       PRE-ZAID PO      Take by mouth daily    AA (alopecia areata)       simvastatin 20 MG tablet    ZOCOR    180 tablet    Take 2 tablets (40 mg) by mouth At Bedtime    AA (alopecia areata)

## 2018-07-05 NOTE — LETTER
"7/5/2018       RE: Sarah A Riedell  [address]  [facility] [address]     Dear Colleague,    Thank you for referring your patient, Sarah A Riedell, to the [facility] DERMATOLOGY at [facility]. Please see a copy of my visit note below.    [facility] Dermatology Note  Encounter Date: Jul 5, 2018    CC:   Chief Complaint   Patient presents with     Hair Loss     Lucrecia is here today for a hair loss follow up. Lucrecia notes\" I have not seen any improvement\"     Dermatology Problem List:  1. Alopecia areata, previously focal patchy disease that responded well to ILK but around April 2017 developed diffuse shedding in context of recent IUD placement (~Nov 2016). IUD now removed.  - Current rx: simvastatin/ezetimibe, nbUVB? (7/7/18)  - Prior rx: prednisone course May-August 2017 with cessation of acute flare, ILK monthly, ketoconazole shampoo, minoxidil, and tacrolimus for eyebrows, clobetasol shampoo, foxfenadine   - Labs 5/2017 with significant iron deficiency (ferritin 4), started on iron supplementation, repeat iron studies 8/24/2017 with improved ferritin to 22.    History of Present Illness:  This 27 year old female presents as a follow-up for alopecia areata. The patient was last seen 11/2017 when we discussed simvastatin/ezetimibe, as well as a topical GAYATHRI kinase inhibitor trial Dr. Wilburn in [place]. The patient pursued enrollment in the topical GAYATHRI kinase inhibitor and in association with the trial had to stop all topical steroids and other treatments. She did not have any improvement after a few months, so she stopped the trial. In March she started simvastatin and ezetimibe and has not noticed any change. Since our last visit she has lost most of her hair including all of the shailesh of hair on her scalp, her eyelashes, her eyebrows, as well as her arm and leg hair.      Past Medical History:   Past Medical History:   Diagnosis Date     Fibroadenoma " of breast      Past Surgical History:  Past Surgical History:   Procedure Laterality Date     BREAST SURGERY      lump removed from left breast     Social History:  [place].     Family History:  Family History   Problem Relation Age of Onset     Asthma Mother      Hypertension Father      Cancer Paternal Grandmother      leukemia     Thyroid Disease Sister      Breast Cancer No family hx of      Melanoma No family hx of      Skin Cancer No family hx of        Medications:  Current Outpatient Prescriptions   Medication Sig Dispense Refill     clobetasol propionate 0.05 % SHAM Apply sparingly to dry scalp once daily as needed.  Leave in place for 15 minutes then add water, lather and rinse thoroughly. 1 Bottle 3     ezetimibe (ZETIA) 10 MG tablet Take 1 tablet (10 mg) by mouth daily 90 tablet 1     fexofenadine (ALLEGRA) 180 MG tablet Take 1 tablet (180 mg) by mouth daily 90 tablet 1     KARIVA 0.15-0.02/0.01 MG (/) per tablet TAKE 1 TABLET BY MOUTH EVERY DAY 28 tablet 0     NONFORMULARY Please dispense one wig prosthesis. 1 each 3     Prenatal Multivit-Min-Fe-FA (PRE-ZAID PO) Take by mouth daily       simvastatin (ZOCOR) 20 MG tablet Take 2 tablets (40 mg) by mouth At Bedtime 180 tablet 1     Allergies:  Allergies   Allergen Reactions     No Clinical Screening - See Comments Unknown     duracef     Review of Systems:  Skin Establ Pt: The patient denies any new rash, pruritus, or lesions that are symptomatic, changing or bleeding, except as per HPI.  Constitutional: The patient denies fatigue, fevers, chills, unintended weight loss, and night sweats.    Physical exam:  There were no vitals taken for this visit.  - This is a well developed, well-nourished female in no acute distress, in a pleasant mood.    - Harris Skin Type I-II  - Sun-exposed skin, which includes the head/face, neck, both arms, digits, and/or nails was examined.   -  Diffuse loss of hair, with absence of eyelashes,  eyebrows, no vellus hairs present on the scalp, and no hair on the arms or legs.     Impression/Plan:  1. Alopecia Areata: We again discussed the treatment options including fecal transplantation (which is not currently available in [place]), squaric acid, tofacitinib, and nbUVB.     Trial of nbUVB, pending approval of home hand unit and/or in office nbUVB    Reference for narrow band and alopecia areata:  Correlation between serum IL-17A level and SALT score in patients with alopecia areata before and after NB-UVB therapy. J Cosmet Dermatol. 2018 Don;17(3):533-537.    CC Alta Sotelo MD  No address on file on close of this encounter.  Follow-up in 2 months, earlier for new or changing lesions.     Flores Avalos MD  PGY-5 Internal Medicine/ Dermatology  [phone]    Dr. Desouza staffed the patient.    Staff Involved:  Resident(Flores Avalos)/Staff(as above)    I have personally examined this patient and agree with Dr. Avalos's documentation and plan of care. I have reviewed and amended the resident's note above. The documentation accurately reflects my clinical observations, diagnoses, treatment and follow-up plans.     Lisa Desouza MD  Dermatology Staff    Again, thank you for allowing me to participate in the care of your patient.      Sincerely,    Flores Avalos MD

## 2018-07-05 NOTE — PROGRESS NOTES
"[facility] D[facility]at[facility] Note  Encounter Date: Jul 5, 2018    CC:   Chief Complaint   Patient presents with     Hair Loss     Lucrecia is here today for a hair loss follow up. Lucrecia notes\" I have not seen any improvement\"     Dermatology Problem List:  1. Alopecia areata, previously focal patchy disease that responded well to ILK but around April 2017 developed diffuse shedding in context of recent IUD placement (~Nov 2016). IUD now removed.  - Current rx: simvastatin/ezetimibe, nbUVB? (7/7/18)  - Prior rx: prednisone course May-August 2017 with cessation of acute flare, ILK monthly, ketoconazole shampoo, minoxidil, and tacrolimus for eyebrows, clobetasol shampoo, foxfenadine   - Labs 5/2017 with significant iron deficiency (ferritin 4), started on iron supplementation, repeat iron studies 8/24/2017 with improved ferritin to 22.    History of Present Illness:  This 27 year old female presents as a follow-up for alopecia areata. The patient was last seen 11/2017 when we discussed simvastatin/ezetimibe, as well as a topical GAYATHRI kinase inhibitor trial Dr. Wilburn in [place]. The patient pursued enrollment in the topical GAYATHRI kinase inhibitor and in association with the trial had to stop all topical steroids and other treatments. She did not have any improvement after a few months, so she stopped the trial. In March she started simvastatin and ezetimibe and has not noticed any change. Since our last visit she has lost most of her hair including all of the shailesh of hair on her scalp, her eyelashes, her eyebrows, as well as her arm and leg hair.      Past Medical History:   Past Medical History:   Diagnosis Date     Fibroadenoma of breast      Past Surgical History:  Past Surgical History:   Procedure Laterality Date     BREAST SURGERY      lump removed from left breast     Social History:  The patient works downtown.     Family History:  Family History   Problem Relation Age of Onset     Asthma Mother      " Hypertension Father      Cancer Paternal Grandmother      leukemia     Thyroid Disease Sister      Breast Cancer No family hx of      Melanoma No family hx of      Skin Cancer No family hx of        Medications:  Current Outpatient Prescriptions   Medication Sig Dispense Refill     clobetasol propionate 0.05 % SHAM Apply sparingly to dry scalp once daily as needed.  Leave in place for 15 minutes then add water, lather and rinse thoroughly. 1 Bottle 3     ezetimibe (ZETIA) 10 MG tablet Take 1 tablet (10 mg) by mouth daily 90 tablet 1     fexofenadine (ALLEGRA) 180 MG tablet Take 1 tablet (180 mg) by mouth daily 90 tablet 1     KARIVA 0.15-0.02/0.01 MG () per tablet TAKE 1 TABLET BY MOUTH EVERY DAY 28 tablet 0     NONFORMULARY Please dispense one wig prosthesis. 1 each 3     Prenatal Multivit-Min-Fe-FA (PRE- PO) Take by mouth daily       simvastatin (ZOCOR) 20 MG tablet Take 2 tablets (40 mg) by mouth At Bedtime 180 tablet 1     Allergies:  Allergies   Allergen Reactions     No Clinical Screening - See Comments Unknown     duracef     Review of Systems:  Skin Establ Pt: The patient denies any new rash, pruritus, or lesions that are symptomatic, changing or bleeding, except as per HPI.  Constitutional: The patient denies fatigue, fevers, chills, unintended weight loss, and night sweats.    Physical exam:  There were no vitals taken for this visit.  - This is a well developed, well-nourished female in no acute distress, in a pleasant mood.    - Harris Skin Type I-II  - Sun-exposed skin, which includes the head/face, neck, both arms, digits, and/or nails was examined.   -  Diffuse loss of hair, with absence of eyelashes, eyebrows, no vellus hairs present on the scalp, and no hair on the arms or legs.     Impression/Plan:  1. Alopecia Areata: We again discussed the treatment options including fecal transplantation (which is not currently available in [place]), squaric acid, tofacitinib, and nbUVB.      Trial of nbUVB, pending approval of home hand unit and/or in office nbUVB    Reference for narrow band and alopecia areata:  Correlation between serum IL-17A level and SALT score in patients with alopecia areata before and after NB-UVB therapy. J Cosmet Dermatol. 2018 Don;17(3):533-537.    CC Alta Sotelo MD  No address on file on close of this encounter.  Follow-up in 2 months, earlier for new or changing lesions.     Flores Avalos MD  PGY-5 Internal Medicine/ Dermatology  [phone]    Dr. Desouza staffed the patient.    Staff Involved:  Resident(Flores Avalos)/Staff(as above)    I have personally examined this patient and agree with Dr. Avalos's documentation and plan of care. I have reviewed and amended the resident's note above. The documentation accurately reflects my clinical observations, diagnoses, treatment and follow-up plans.     Lisa Desouza MD  Dermatology Staff

## 2018-07-10 ENCOUNTER — TELEPHONE (OUTPATIENT)
Dept: DERMATOLOGY | Facility: CLINIC | Age: 28
End: 2018-07-10

## 2018-07-10 NOTE — TELEPHONE ENCOUNTER
----- Message from Sandy Dutton CMA sent at 7/10/2018  2:12 PM CDT -----  Because you know the deal, anyway you can reach our to Vasile and inform her of the message below?  ----- Message -----     From: Brigitte Gu     Sent: 7/6/2018   1:35 PM       To: Raiza Paz CMA, Sandy Dutton CMA    Good afternoon ladies,    I checked coverage through Vasile's plan for the NB UVB treatments, she will be covered under medical necessity. However, she does have a sizable deductible as well as a 20% coinsurance after the deductible has been met.     This means she should have coverage for the treatments, but she has patient financial responsibility. I would encourage her to contact her insurance company directly in order to obtain specific dollar amounts and accumulations.    She can go ahead and schedule those treatments as long as she is aware that she will have to pay charges in full up to her deductible amount, and then 20% of charges after that.  Thanks!    Brigitte Briggs    ----- Message -----     From: Brigitte Gu     Sent: 7/6/2018   9:57 AM       To: Raiza Paz CMA, Flores Avalos MD    Thanks! I'll check coverage right away and let you knowBrigitte    ----- Message -----     From: Raiza Paz CMA     Sent: 7/6/2018   9:24 AM       To: Brigitte Gu    Narrow band UVB and CPT code L63  ----- Message -----     From: Flores Avalos MD     Sent: 7/6/2018   9:00 AM       To: Raiza Paz CMA    The indication would be alopecia areata. It would be narrow band UVB.     Do you need more information than that?     ThanksFlores     ----- Message -----     From: Raiza Paz CMA     Sent: 7/6/2018   8:44 AM       To: Sandy Dutton CMA, Sigrid M Collier, MD Dr. Collier,   Which light box would vasile be using and can you place the orders for light therapy in clinic.   ----- Message -----     From: Brigitte Gu     Sent: 7/6/2018   8:33 AM       To: BARB Lyn  Raiza,    What kind of light therapy is being ordered? I didn't see an indication, and I'll need that to get the correct CPT code to run by insurance.  Thanks!    Brigitte    ----- Message -----     From: Raiza Paz, [facility]     Sent: 7/6/2018   8:13 AM       To: Brigitte Briggs can you do a light therapy PA for this patient

## 2018-07-12 ENCOUNTER — MYC MEDICAL ADVICE (OUTPATIENT)
Dept: DERMATOLOGY | Facility: CLINIC | Age: 28
End: 2018-07-12

## 2018-07-12 NOTE — TELEPHONE ENCOUNTER
Spoke with patient and informed her of the below message from PA team.   Lucrecia will reach out to her insurance company for additional monetary details.   All questions answered.     Leticia Hall RN

## 2018-07-13 NOTE — TELEPHONE ENCOUNTER
Patient also called in and wants to know what the code is so she can contact her insurance company and see what the exact cost of light treatment would be?

## 2018-07-16 ENCOUNTER — TELEPHONE (OUTPATIENT)
Dept: DERMATOLOGY | Facility: CLINIC | Age: 28
End: 2018-07-16

## 2018-08-17 ENCOUNTER — MYC MEDICAL ADVICE (OUTPATIENT)
Dept: FAMILY MEDICINE | Facility: CLINIC | Age: 28
End: 2018-08-17

## 2018-08-23 NOTE — TELEPHONE ENCOUNTER
I have not seen this patient since 2015.  I do not refer patient to [facility] as we do have rheumatologist in our system.  Patient has option to go there if they chose to.    Dr.Nasima Benny MD

## 2018-08-23 NOTE — TELEPHONE ENCOUNTER
Dr Zapata denied referral to [facility]. Gave patient a list of rheumatologist with our [place] network and specialty network.     Mirian Roche  Referral Coordinator

## 2018-09-06 ENCOUNTER — OFFICE VISIT (OUTPATIENT)
Dept: DERMATOLOGY | Facility: CLINIC | Age: 28
End: 2018-09-06
Payer: COMMERCIAL

## 2018-09-06 DIAGNOSIS — L63.9 AA (ALOPECIA AREATA): Primary | ICD-10-CM

## 2018-09-06 DIAGNOSIS — M35.9 AUTOIMMUNE DISEASE (H): ICD-10-CM

## 2018-09-06 ASSESSMENT — PAIN SCALES - GENERAL: PAINLEVEL: NO PAIN (0)

## 2018-09-06 NOTE — NURSING NOTE
Drug Administration Record    Drug Name: triamcinolone acetonide(kenalog)  Dose: 3.0mL of triamcinolone 5mg/mL, 15.0mg dose  Route administered: ID  NDC #: Kenalog-10 ([id number])  Amount of waste(mL):2.0mL  Reason for waste: Single use vial    LOT #: [id number]   SITE: Scalp  : Flashtalking  EXPIRATION DATE: FEB2020    Injected by: Resident Doctor Flores Sanchez LPN

## 2018-09-06 NOTE — LETTER
"9/6/2018       RE: Sarah A Riedell  [address]  [place] [address]     Dear Colleague,    Thank you for referring your patient, Sarah A Riedell, to the [facility] Please see a copy of my visit note below.    [facility] Dermatology Note  Encounter Date: Sep 6, 2018    CC:   Chief Complaint   Patient presents with     Hair Loss     Alopecia Areata - \"It's the same\" since her last visit.     Dermatology Problem List:  1. Alopecia areata, previously focal patchy disease that responded well to ILK but around April 2017 developed diffuse shedding in context of recent IUD placement (~Nov 2016). IUD now removed.  - Current rx: nbUVB 3x per week (is waiting for unit) + ILK  - Prior rx: prednisone course May-August 2017 with cessation of acute flare, ILK monthly, ketoconazole shampoo, minoxidil, and tacrolimus for eyebrows, clobetasol shampoo, fexofenadine   - Labs 5/2017 with significant iron deficiency (ferritin 4), started on iron supplementation, repeat iron studies 8/24/2017 with improved ferritin to 22.    History of Present Illness:  This 28 year old female presents as a follow-up for alopecia areata. The patient was last seen 7/5/18 when the plan was to start nbUVB. Since that time she has been working with insurance to try to get approval for nbUVB, and she has just ordered her dermalume unit. Overall her hair has been the same without significant regrowth.        She is currently off of fexofenadine, simvastatin/ezetimibe. She has made significant changes to her diet in the last several months and has cut out gluten and dairy. She is primarily eating a paleo diet with meat, vegetable, and fruit. She has noticed improvement in her nails since changing her diet, but no change in her hair. She is also drinking bone broth and taking a collagen supplement.      Past Medical History:   Past Medical History:   Diagnosis Date "     Fibroadenoma of breast      Past Surgical History:  Past Surgical History:   Procedure Laterality Date     BREAST SURGERY      lump removed from left breast     Social History:  The patient works in [employer].     Family History:  Family History   Problem Relation Age of Onset     Asthma Mother      Hypertension Father      Cancer Paternal Grandmother      leukemia     Thyroid Disease Sister      Breast Cancer No family hx of      Melanoma No family hx of      Skin Cancer No family hx of      Medications:  Current Outpatient Prescriptions   Medication Sig Dispense Refill     clobetasol propionate 0.05 % SHAM Apply sparingly to dry scalp once daily as needed.  Leave in place for 15 minutes then add water, lather and rinse thoroughly. 1 Bottle 3     ezetimibe (ZETIA) 10 MG tablet Take 1 tablet (10 mg) by mouth daily 90 tablet 1     fexofenadine (ALLEGRA) 180 MG tablet Take 1 tablet (180 mg) by mouth daily 90 tablet 1     KARIVA 0.15-0.02/0.01 MG (/) per tablet TAKE 1 TABLET BY MOUTH EVERY DAY 28 tablet 0     NONFORMULARY Please dispense one wig prosthesis. 1 each 3     Prenatal Multivit-Min-Fe-FA (PRE- PO) Take by mouth daily       simvastatin (ZOCOR) 20 MG tablet Take 2 tablets (40 mg) by mouth At Bedtime 180 tablet 1     triamcinolone acetonide (KENALOG) 10 MG/ML injection Inject 1.5 mLs (15 mg) into the skin once for 1 dose 1.5 mL 0     Allergies:  Allergies   Allergen Reactions     No Clinical Screening - See Comments Unknown     duracef     Review of Systems:  Skin Establ Pt: The patient denies any new rash, pruritus, or lesions that are symptomatic, changing or bleeding, except as per HPI.  Constitutional: The patient denies fatigue, fevers, chills, unintended weight loss, and night sweats.    Physical exam:  There were no vitals taken for this visit.  - This is a well developed, well-nourished female in no acute distress, in a pleasant mood.    - Harris Skin Type I-II  - Focused examination  of the face, scalp, neck, hands, and finger and toe nails was performed.  - No vellus or terminal hairs on the scalp, face, or arms.   - Nails with improved longitudinal ridging, and healthy nail of the proximal toenails    Impression/Plan:  1. Alopecia Areata: Patient has been on numerous treatments to date without much improvement in her disease. She is very interested in the prospect of fecal transplantation, and is currently doing dietary modifications to alter her microbiome.   Kenalog intralesional injection procedure note: After verbal consent and discussion of risks including but not limited to atrophy, pain, and bruising, time out was performed, the patient underwent positioning and the area was prepped with isopropyl alcohol, 3 total cc of Kenalog 5 mg/cc was injected into ~30 sites on the scalp.  The patient tolerated the procedure well and left the Dermatology clinic in good condition.      At follow-up visit plan for patient to take home initial kit for collection of her current microbiome    Patient will start nbUVB at home when she receives the unit, plan for her to follow the protocol for solorzano type 1 skin, starting at 10 seconds and increasing by 10 seconds weekly. She will treat the area three times per week.     2. Longitudinal ridging:     Improving with dietary modifications, continue to monitor    No address on file on close of this encounter.  Follow-up in 4-6 weeks, earlier for new or changing lesions.     Flores Avalos MD  PGY-5 Internal Medicine/ Dermatology  [phone]    Dr. Edwards staffed the patient.    Staff Involved:  Resident(Flores Avalos)/Staff(as above)    CC Alta Sotelo MD

## 2018-09-06 NOTE — NURSING NOTE
"Dermatology Rooming Note    Sarah A Riedell's goals for this visit include:   Chief Complaint   Patient presents with     Hair Loss     Alopecia Areata - \"It's the same\" since her last visit.     Sandy Dutton, CMA  "

## 2018-09-06 NOTE — PROGRESS NOTES
"[facility] Dermatology Note  Encounter Date: Sep 6, 2018    CC:   Chief Complaint   Patient presents with     Hair Loss     Alopecia Areata - \"It's the same\" since her last visit.     Dermatology Problem List:  1. Alopecia areata, previously focal patchy disease that responded well to ILK but around April 2017 developed diffuse shedding in context of recent IUD placement (~Nov 2016). IUD now removed.  - Current rx: nbUVB 3x per week (is waiting for unit) + ILK  - Prior rx: prednisone course May-August 2017 with cessation of acute flare, ILK monthly, ketoconazole shampoo, minoxidil, and tacrolimus for eyebrows, clobetasol shampoo, fexofenadine   - Labs 5/2017 with significant iron deficiency (ferritin 4), started on iron supplementation, repeat iron studies 8/24/2017 with improved ferritin to 22.    History of Present Illness:  This 28 year old female presents as a follow-up for alopecia areata. The patient was last seen 7/5/18 when the plan was to start nbUVB. Since that time she has been working with insurance to try to get approval for nbUVB, and she has just ordered her dermalume unit. Overall her hair has been the same without significant regrowth.        She is currently off of fexofenadine, simvastatin/ezetimibe. She has made significant changes to her diet in the last several months and has cut out gluten and dairy. She is primarily eating a paleo diet with meat, vegetable, and fruit. She has noticed improvement in her nails since changing her diet, but no change in her hair. She is also drinking bone broth and taking a collagen supplement.      Past Medical History:   Past Medical History:   Diagnosis Date     Fibroadenoma of breast      Past Surgical History:  Past Surgical History:   Procedure Laterality Date     BREAST SURGERY      lump removed from left breast     Social History:  The patient works in [employer].     Family History:  Family History   Problem Relation Age of Onset     " Asthma Mother      Hypertension Father      Cancer Paternal Grandmother      leukemia     Thyroid Disease Sister      Breast Cancer No family hx of      Melanoma No family hx of      Skin Cancer No family hx of      Medications:  Current Outpatient Prescriptions   Medication Sig Dispense Refill     clobetasol propionate 0.05 % SHAM Apply sparingly to dry scalp once daily as needed.  Leave in place for 15 minutes then add water, lather and rinse thoroughly. 1 Bottle 3     ezetimibe (ZETIA) 10 MG tablet Take 1 tablet (10 mg) by mouth daily 90 tablet 1     fexofenadine (ALLEGRA) 180 MG tablet Take 1 tablet (180 mg) by mouth daily 90 tablet 1     KARIVA 0.15-0.02/0.01 MG () per tablet TAKE 1 TABLET BY MOUTH EVERY DAY 28 tablet 0     NONFORMULARY Please dispense one wig prosthesis. 1 each 3     Prenatal Multivit-Min-Fe-FA (PRE-ZAID PO) Take by mouth daily       simvastatin (ZOCOR) 20 MG tablet Take 2 tablets (40 mg) by mouth At Bedtime 180 tablet 1     triamcinolone acetonide (KENALOG) 10 MG/ML injection Inject 1.5 mLs (15 mg) into the skin once for 1 dose 1.5 mL 0     Allergies:  Allergies   Allergen Reactions     No Clinical Screening - See Comments Unknown     duracef     Review of Systems:  Skin Establ Pt: The patient denies any new rash, pruritus, or lesions that are symptomatic, changing or bleeding, except as per HPI.  Constitutional: The patient denies fatigue, fevers, chills, unintended weight loss, and night sweats.    Physical exam:  There were no vitals taken for this visit.  - This is a well developed, well-nourished female in no acute distress, in a pleasant mood.    - Harris Skin Type I-II  - Focused examination of the face, scalp, neck, hands, and finger and toe nails was performed.  - No vellus or terminal hairs on the scalp, face, or arms.   - Nails with improved longitudinal ridging, and healthy nail of the proximal toenails    Impression/Plan:  1. Alopecia Areata: Patient has been on  numerous treatments to date without much improvement in her disease. She is very interested in the prospect of fecal transplantation, and is currently doing dietary modifications to alter her microbiome.   Kenalog intralesional injection procedure note: After verbal consent and discussion of risks including but not limited to atrophy, pain, and bruising, time out was performed, the patient underwent positioning and the area was prepped with isopropyl alcohol, 3 total cc of Kenalog 5 mg/cc was injected into ~30 sites on the scalp.  The patient tolerated the procedure well and left the Dermatology clinic in good condition.      At follow-up visit plan for patient to take home initial kit for collection of her current microbiome    Patient will start nbUVB at home when she receives the unit, plan for her to follow the protocol for solorzano type 1 skin, starting at 10 seconds and increasing by 10 seconds weekly until mild erythema is reached. She will treat the area three times per week.     2. Longitudinal ridging:     Improving with dietary modifications, continue to monitor    CC Alta Sotelo MD  No address on file on close of this encounter.  Follow-up in 4-6 weeks, earlier for new or changing lesions.     Flores Avalos MD  PGY-5 Internal Medicine/ Dermatology  [phone]    Dr. Edwards staffed the patient.    Staff Involved:  Resident(Flores Avalos)/Staff(as above)      Patient was seen and examined with the medicine/dermatology resident. I agree with the history, review of systems, physical examination, assessments and plan. I was present for the key portions of the ILK procedure.    Andreia Edwards MD  Professor and  Chair  Department of Dermatology  [facility]

## 2018-09-06 NOTE — MR AVS SNAPSHOT
After Visit Summary   9/6/2018    Sarah A Riedell    MRN: [id number]           Patient Information     Date Of Birth          1990        Visit Information        Provider Department      9/6/2018 8:00 AM Flores Avalos MD [facility] Dermatology        Today's Diagnoses     AA (alopecia areata)    -  1       Follow-ups after your visit        Your next 10 appointments already scheduled     Oct 04, 2018  7:40 AM CDT   (Arrive by 7:25 AM)   Return Visit with Naomie Martini MD   [facility] Dermatology ([facility])    [address] [place] [id number] [phone]              Who to contact     Please call your clinic at [phone] to:    Ask questions about your health    Make or cancel appointments    Discuss your medicines    Learn about your test results    Speak to your doctor            Additional Information About Your Visit        MyChart Information     [employer] gives you secure access to your electronic health record. If you see a primary care provider, you can also send messages to your care team and make appointments. If you have questions, please call your primary care clinic.  If you do not have a primary care provider, please call [phone] and they will assist you.      [employer] is an electronic gateway that provides easy, online access to your medical records. With [employer], you can request a clinic appointment, read your test results, renew a prescription or communicate with your care team.     To access your existing account, please contact your [facility] or call [phone] for assistance.        Care EveryWhere ID     This is your Care EveryWhere ID. This could be used by other organizations to access your [place] medical records  [id number]         Blood Pressure from Last 3 Encounters:   05/25/17 122/80   10/02/15 106/69   12/04/14 100/76    Weight from Last 3 Encounters:   10/02/15 71.4 kg  (157 lb 6.4 oz)   12/04/14 71.8 kg (158 lb 6.4 oz)   06/12/14 71.7 kg (158 lb)              We Performed the Following     INJECTION INTO SKIN LESIONS >7          Today's Medication Changes          These changes are accurate as of 9/6/18  8:33 AM.  If you have any questions, ask your nurse or doctor.               Start taking these medicines.        Dose/Directions    triamcinolone acetonide 10 MG/ML injection   Commonly known as:  KENALOG   Used for:  AA (alopecia areata)   Started by:  Flores Avalos MD        Dose:  15 mg   Inject 1.5 mLs (15 mg) into the skin once for 1 dose   Quantity:  1.5 mL   Refills:  0            Where to get your medicines      Some of these will need a paper prescription and others can be bought over the counter.  Ask your nurse if you have questions.     You don't need a prescription for these medications     triamcinolone acetonide 10 MG/ML injection                Primary Care Provider Office Phone # Fax #    Alta ESTIVEN Zapata [phone] [address] [address]        Equal Access to Services     [facility] AH: Hadii mildred Dodd, waaxda alma, qaybta kaaleverett flynn, rachel mann . So [facility] [phone].    ATENCIÓN: Si habla español, tiene a reyes disposición servicios gratuitos de asistencia lingüística. [facility] [phone].    We comply with applicable federal civil rights laws and [place] laws. We do not discriminate on the basis of race, color, national origin, age, disability, sex, sexual orientation, or gender identity.            Thank you!     Thank you for choosing [facility] DERMATOLOGY  for your care. Our goal is always to provide you with excellent care. Hearing back from our patients is one way we can continue to improve our services. Please take a few minutes to complete the written survey that you may receive in the mail after your visit with us. Thank you!              Your Updated Medication List - Protect others around you: Learn how to safely use, store and throw away your medicines at www.disposemymeds.org.          This list is accurate as of 18  8:33 AM.  Always use your most recent med list.                   Brand Name Dispense Instructions for use Diagnosis    clobetasol propionate 0.05 % Sham     1 Bottle    Apply sparingly to dry scalp once daily as needed.  Leave in place for 15 minutes then add water, lather and rinse thoroughly.    AA (alopecia areata)       ezetimibe 10 MG tablet    ZETIA    90 tablet    Take 1 tablet (10 mg) by mouth daily    AA (alopecia areata)       fexofenadine 180 MG tablet    ALLEGRA    90 tablet    Take 1 tablet (180 mg) by mouth daily    AA (alopecia areata)       KARIVA 0.15-0.02/0.01 MG () per tablet   Generic drug:  desogestrel-ethinyl estradiol     28 tablet    TAKE 1 TABLET BY MOUTH EVERY DAY    Encounter for contraceptive management, unspecified contraceptive encounter type       NONFORMULARY     1 each    Please dispense one wig prosthesis.    Alopecia areata       PRE- PO      Take by mouth daily    AA (alopecia areata)       simvastatin 20 MG tablet    ZOCOR    180 tablet    Take 2 tablets (40 mg) by mouth At Bedtime    AA (alopecia areata)       triamcinolone acetonide 10 MG/ML injection    KENALOG    1.5 mL    Inject 1.5 mLs (15 mg) into the skin once for 1 dose    AA (alopecia areata)

## 2018-10-04 ENCOUNTER — OFFICE VISIT (OUTPATIENT)
Dept: DERMATOLOGY | Facility: CLINIC | Age: 28
End: 2018-10-04
Payer: COMMERCIAL

## 2018-10-04 DIAGNOSIS — L63.9 AA (ALOPECIA AREATA): Primary | ICD-10-CM

## 2018-10-04 DIAGNOSIS — M35.9 AUTOIMMUNE DISEASE (H): ICD-10-CM

## 2018-10-04 DIAGNOSIS — L63.9 AA (ALOPECIA AREATA): ICD-10-CM

## 2018-10-04 LAB
ALBUMIN SERPL-MCNC: 3.8 G/DL (ref 3.4–5)
FERRITIN SERPL-MCNC: 24 NG/ML (ref 12–150)
IRON SATN MFR SERPL: 35 % (ref 15–46)
IRON SERPL-MCNC: 97 UG/DL (ref 35–180)
PROT SERPL-MCNC: 7.4 G/DL (ref 6.8–8.8)
TIBC SERPL-MCNC: 281 UG/DL (ref 240–430)

## 2018-10-04 ASSESSMENT — PAIN SCALES - GENERAL: PAINLEVEL: NO PAIN (0)

## 2018-10-04 NOTE — NURSING NOTE
Drug Administration Record    Drug Name: triamcinolone acetonide(kenalog)  Dose: 3mL of triamcinolone 5mg/mL, 15mg dose  Route administered: ID  NDC #: Kenalog-10 (4848-4705-85)  Amount of waste(mL):2.2  Reason for waste: Single use vial    LOT #: [id number]  SITE: Scalp  : ITelagen  EXPIRATION DATE: 04/2020    Drug Administration Record    Drug Name: triamcinolone acetonide(kenalog)  Dose: 0.3mL of triamcinolone 10mg/mL, 3mg dose  Route administered: ID  NDC #: Kenalog-10 (9685-4585-58)  Amount of waste(mL):2.2  Reason for waste: Single use vial    LOT #: [id number]  SITE: Scalp  : ITelagen  EXPIRATION DATE: 04/2020

## 2018-10-04 NOTE — NURSING NOTE
Dermatology Rooming Note    Sarah A Riedell's goals for this visit include:   Chief Complaint   Patient presents with     Derm Problem     Lucrecia is here regarding alopecia.      Denise Schneider LPN

## 2018-10-04 NOTE — PROGRESS NOTES
[facility] Dermatology Note    Encounter Date: Oct 4, 2018    CC:   Chief Complaint   Patient presents with     Derm Problem     Lucrecia is here regarding alopecia.      Dermatology Problem List:  1. Alopecia areata, previously focal patchy disease that responded well to ILK but around April 2017 developed diffuse shedding in context of recent IUD placement (~Nov 2016). IUD now removed.  - Current rx: nbUVB 3x per week + ILK-5 (test spots on the central frontal scalp of ILK-10 to check for dose response)  - Prior rx: prednisone course May-August 2017 with cessation of acute flare, ILK monthly, ketoconazole shampoo, minoxidil, and tacrolimus for eyebrows, clobetasol shampoo, fexofenadine   - Labs 5/2017 with significant iron deficiency (ferritin 4), started on iron supplementation, repeat iron studies 8/24/2017 with improved ferritin to 22.  - Recheck iron studies and protein levels on 10/4/18.     History of Present Illness:  Ms. Riedell is a 28 year old female presents as a follow-up for alopecia universalis. The patient was last seen 9/6/18 at which time she received ILK-5 and to start nbUVB once she receives her dermalume unit. Section that was treated with ILK has had some light growth. Has been doing nbUVB 3 times a week for the last 3-4 weeks and has been adding 3-5 seconds each week. Also reports that she has grown a few hairs along the bikini line and one hair in the armpit. No growthof eyebrows or eyelashes. Would like to get more ILK in a different section of her scalp.         She is currently off of fexofenadine, simvastatin/ezetimibe. Still taking iron supplements. She has made significant changes to her diet in the last several months and has cut out gluten and dairy. She is primarily eating a paleo diet with meat, vegetable, and fruit. She has noticed improvement in her nails since changing her diet. She is also drinking bone broth and taking a collagen supplement.      Past Medical  History:   Past Medical History:   Diagnosis Date     Fibroadenoma of breast      Past Surgical History:  Past Surgical History:   Procedure Laterality Date     BREAST SURGERY      lump removed from left breast     Social History:  The patient works in [employer].     Family History:  Family History   Problem Relation Age of Onset     Asthma Mother      Hypertension Father      Cancer Paternal Grandmother      leukemia     Thyroid Disease Sister      Breast Cancer No family hx of      Melanoma No family hx of      Skin Cancer No family hx of      Medications:  Current Outpatient Prescriptions   Medication Sig Dispense Refill     clobetasol propionate 0.05 % SHAM Apply sparingly to dry scalp once daily as needed.  Leave in place for 15 minutes then add water, lather and rinse thoroughly. 1 Bottle 3     ezetimibe (ZETIA) 10 MG tablet Take 1 tablet (10 mg) by mouth daily 90 tablet 1     fexofenadine (ALLEGRA) 180 MG tablet Take 1 tablet (180 mg) by mouth daily 90 tablet 1     KARIVA 0.15-0.02/0.01 MG (/) per tablet TAKE 1 TABLET BY MOUTH EVERY DAY 28 tablet 0     NONFORMULARY Please dispense one wig prosthesis. 1 each 3     Prenatal Multivit-Min-Fe-FA (PRE- PO) Take by mouth daily       simvastatin (ZOCOR) 20 MG tablet Take 2 tablets (40 mg) by mouth At Bedtime 180 tablet 1     Allergies:  Allergies   Allergen Reactions     No Clinical Screening - See Comments Unknown     duracef     Review of Systems:  Skin Establ Pt: The patient denies any new rash, pruritus, or lesions that are symptomatic, changing or bleeding, except as per HPI.  Constitutional: The patient denies fatigue, fevers, chills, unintended weight loss, and night sweats.    Physical exam:  There were no vitals taken for this visit.  - This is a well developed, well-nourished female in no acute distress, in a pleasant mood.    - Harris Skin Type I-II  - Focused examination of the face, scalp, neck, hands, and finger and toe nails was  performed.  - Fine vellus hairs on the right scalp at area of ILK treatment.  - No vellus or terminal hairs on the face or arms.   - Nails with improved longitudinal ridging.    Impression/Plan:  1. Alopecia universalis: Patient has been on numerous treatments to date without much improvement in her disease. She is very interested in the prospect of fecal transplantation, and is currently doing dietary modifications to alter her microbiome. Test for dose response was performed with ILK-10 on the central frontal scalp.   Kenalog intralesional injection procedure note: After verbal consent and discussion of risks including but not limited to atrophy, pain, and bruising, time out was performed, the patient underwent positioning and the area was prepped with isopropyl alcohol, 3 total cc of Kenalog 5 mg/cc was injected into ~30 sites on the central and left scalp. On the frontal central scalp, 0.2cc of Kenalog 10mg/cc was injected. The patient tolerated the procedure well and left the Dermatology clinic in good condition.      At follow-up visit plan for patient to take home initial kit for collection of her current microbiome (study with GI department)    Continue with nbUVB at home TIW, plan for her to follow the protocol for solorzano type 1 skin, increasing by 10 seconds weekly.    Recheck iron, TIBC, ferritin, total protein, and albumin levels    2. Longitudinal ridging:     Improving with dietary modifications, continue to monitor    CC Dr. Zapata, Alta R Referred Self, MD  No address on file on close of this encounter.  Follow-up in 4-6 weeks, earlier for new or changing lesions.     Dr. Edwards staffed the patient.    Staff Involved:  Resident(Naomie Martini)/Staff(as above)    Naomie Martini M.D.  PGY-3 Resident  Dermatology  [facility]      Patient was seen and examined with the dermatology resident. I agree with the history, review of systems, physical examination, assessments and plan. I was  present for the key portions of the ILK procedure.    Andreia Edwards MD  Professor and  Chair  Department of Dermatology  [facility]

## 2018-10-04 NOTE — LETTER
10/4/2018       RE: Sarah A Riedell  [address]  [facility] [address]     Dear Colleague,    Thank you for referring your patient, Sarah A Riedell, to the [facility] DERMATOLOGY at [facility]. Please see a copy of my visit note below.    [facility] Dermatology Note    Encounter Date: Oct 4, 2018    CC:   Chief Complaint   Patient presents with     Derm Problem     Lucrecia is here regarding alopecia.      Dermatology Problem List:  1. Alopecia areata, previously focal patchy disease that responded well to ILK but around April 2017 developed diffuse shedding in context of recent IUD placement (~Nov 2016). IUD now removed.  - Current rx: nbUVB 3x per week + ILK-5 (test spots on the central frontal scalp of ILK-10 to check for dose response)  - Prior rx: prednisone course May-August 2017 with cessation of acute flare, ILK monthly, ketoconazole shampoo, minoxidil, and tacrolimus for eyebrows, clobetasol shampoo, fexofenadine   - Labs 5/2017 with significant iron deficiency (ferritin 4), started on iron supplementation, repeat iron studies 8/24/2017 with improved ferritin to 22.  - Recheck iron studies and protein levels on 10/4/18.     History of Present Illness:  Ms. Riedell is a 28 year old female presents as a follow-up for alopecia universalis. The patient was last seen 9/6/18 at which time she received ILK-5 and to start nbUVB once she receives her dermalume unit. Section that was treated with ILK has had some light growth. Has been doing nbUVB 3 times a week for the last 3-4 weeks and has been adding 3-5 seconds each week. Also reports that she has grown a few hairs along the bikini line and one hair in the armpit. No growthof eyebrows or eyelashes. Would like to get more ILK in a different section of her scalp.         She is currently off of fexofenadine, simvastatin/ezetimibe. Still taking iron supplements. She has made significant changes to her  diet in the last several months and has cut out gluten and dairy. She is primarily eating a paleo diet with meat, vegetable, and fruit. She has noticed improvement in her nails since changing her diet. She is also drinking bone broth and taking a collagen supplement.      Past Medical History:   Past Medical History:   Diagnosis Date     Fibroadenoma of breast      Past Surgical History:  Past Surgical History:   Procedure Laterality Date     BREAST SURGERY      lump removed from left breast     Social History:  The patient works in [employer].     Family History:  Family History   Problem Relation Age of Onset     Asthma Mother      Hypertension Father      Cancer Paternal Grandmother      leukemia     Thyroid Disease Sister      Breast Cancer No family hx of      Melanoma No family hx of      Skin Cancer No family hx of      Medications:  Current Outpatient Prescriptions   Medication Sig Dispense Refill     clobetasol propionate 0.05 % SHAM Apply sparingly to dry scalp once daily as needed.  Leave in place for 15 minutes then add water, lather and rinse thoroughly. 1 Bottle 3     ezetimibe (ZETIA) 10 MG tablet Take 1 tablet (10 mg) by mouth daily 90 tablet 1     fexofenadine (ALLEGRA) 180 MG tablet Take 1 tablet (180 mg) by mouth daily 90 tablet 1     KARIVA 0.15-0.02/0.01 MG (/) per tablet TAKE 1 TABLET BY MOUTH EVERY DAY 28 tablet 0     NONFORMULARY Please dispense one wig prosthesis. 1 each 3     Prenatal Multivit-Min-Fe-FA (PRE- PO) Take by mouth daily       simvastatin (ZOCOR) 20 MG tablet Take 2 tablets (40 mg) by mouth At Bedtime 180 tablet 1     Allergies:  Allergies   Allergen Reactions     No Clinical Screening - See Comments Unknown     duracef     Review of Systems:  Skin Establ Pt: The patient denies any new rash, pruritus, or lesions that are symptomatic, changing or bleeding, except as per HPI.  Constitutional: The patient denies fatigue, fevers, chills, unintended weight loss, and night  sweats.    Physical exam:  There were no vitals taken for this visit.  - This is a well developed, well-nourished female in no acute distress, in a pleasant mood.    - Harris Skin Type I-II  - Focused examination of the face, scalp, neck, hands, and finger and toe nails was performed.  - Fine vellus hairs on the right scalp at area of ILK treatment.  - No vellus or terminal hairs on the face or arms.   - Nails with improved longitudinal ridging.    Impression/Plan:  1. Alopecia universalis: Patient has been on numerous treatments to date without much improvement in her disease. She is very interested in the prospect of fecal transplantation, and is currently doing dietary modifications to alter her microbiome. Test for dose response was performed with ILK-10 on the central frontal scalp.   Kenalog intralesional injection procedure note: After verbal consent and discussion of risks including but not limited to atrophy, pain, and bruising, time out was performed, the patient underwent positioning and the area was prepped with isopropyl alcohol, 3 total cc of Kenalog 5 mg/cc was injected into ~30 sites on the central and left scalp. On the frontal central scalp, 0.2cc of Kenalog 10mg/cc was injected. The patient tolerated the procedure well and left the Dermatology clinic in good condition.      At follow-up visit plan for patient to take home initial kit for collection of her current microbiome (study with GI department)    Continue with nbUVB at home TIW, plan for her to follow the protocol for harris type 1 skin, increasing by 10 seconds weekly.    Recheck iron, TIBC, ferritin, total protein, and albumin levels    2. Longitudinal ridging:     Improving with dietary modifications, continue to monitor    CC Alta Sotelo MD  No address on file on close of this encounter.  Follow-up in 4-6 weeks, earlier for new or changing lesions.     Dr. Edwards staffed the patient.    Staff  Involved:  Resident(Naomie Martini)/Staff(as above)    Naomie Martini M.D.  PGY-3 Resident  Dermatology  [facility]

## 2018-10-04 NOTE — MR AVS SNAPSHOT
After Visit Summary   10/4/2018    Sarah A Riedell    MRN: [id number]           Patient Information     Date Of Birth          1990        Visit Information        Provider Department      10/4/2018 7:40 AM Naomie Martini MD [facility] Dermatology        Today's Diagnoses     AA (alopecia areata)    -  1       Follow-ups after your visit        Follow-up notes from your care team     Return in about 4 weeks (around 11/1/2018).      Your next 10 appointments already scheduled     Nov 01, 2018  8:20 AM CDT   (Arrive by 8:05 AM)   Return Visit with Danae Fall MD   [facility] Dermatology ([facility] [place])    [address] [id number] [phone]            Dec 06, 2018  7:40 AM CST   (Arrive by 7:25 AM)   Return Visit with Naomie Martini MD   [facility] Dermatology ([facility])    [address] [address] [phone]            Jan 03, 2019  8:20 AM CST   (Arrive by 8:05 AM)   Return Visit with Danae Fall MD   [facility] Dermatology ([facility])    [address] [place] [address] [phone]              Who to contact     Please call your clinic at [phone] to:    Ask questions about your health    Make or cancel appointments    Discuss your medicines    Learn about your test results    Speak to your doctor            Additional Information About Your Visit        MedHOKhart Information     Who[employer]anna gives you secure access to your electronic health record. If you see a primary care provider, you can also send messages to your care team and make appointments. If you have questions, please call your primary care clinic.  If you do not have a primary care provider, please call [phone] and they will assist you.      WhoWanna is an electronic gateway that provides easy, online access to your medical records. With WhoWanna, you  can request a clinic appointment, read your test results, renew a prescription or communicate with your care team.     To access your existing account, please contact your [facility] or call [phone] for assistance.        Care EveryWhere ID     This is your Care EveryWhere ID. This could be used by other organizations to access your [place] medical records  [id number]         Blood Pressure from Last 3 Encounters:   No data found for BP    Weight from Last 3 Encounters:   No data found for Wt              Today, you had the following     No orders found for display       Primary Care Provider Office Phone # Fax #    Alta Zapata [phone] [address] [facility] [address]  [place] [address]        Equal Access to Services     [facility] : Hadii aad ku apolinaro Yousif, waaxda margaretadaha, qaybta kaalmada pepeyada, rachel mann . So [facility] [phone].    ATENCIÓN: Si habla español, tiene a reyes disposición servicios gratuitos de asistencia lingüística. Llame al [phone].    We comply with applicable federal civil rights laws and [place] laws. We do not discriminate on the basis of race, color, national origin, age, disability, sex, sexual orientation, or gender identity.            Thank you!     Thank you for choosing [facility]  for your care. Our goal is always to provide you with excellent care. Hearing back from our patients is one way we can continue to improve our services. Please take a few minutes to complete the written survey that you may receive in the mail after your visit with us. Thank you!             Your Updated Medication List - Protect others around you: Learn how to safely use, store and throw away your medicines at www.disposemymeds.org.          This list is accurate as of 10/4/18 11:59 PM.  Always use your most recent med list.                   Brand Name Dispense Instructions for  use Diagnosis    clobetasol propionate 0.05 % Sham     1 Bottle    Apply sparingly to dry scalp once daily as needed.  Leave in place for 15 minutes then add water, lather and rinse thoroughly.    AA (alopecia areata)       ezetimibe 10 MG tablet    ZETIA    90 tablet    Take 1 tablet (10 mg) by mouth daily    AA (alopecia areata)       fexofenadine 180 MG tablet    ALLEGRA    90 tablet    Take 1 tablet (180 mg) by mouth daily    AA (alopecia areata)       KARIVA 0.15-0.02/0.01 MG () per tablet   Generic drug:  desogestrel-ethinyl estradiol     28 tablet    TAKE 1 TABLET BY MOUTH EVERY DAY    Encounter for contraceptive management, unspecified contraceptive encounter type       NONFORMULARY     1 each    Please dispense one wig prosthesis.    Alopecia areata       PRE-ZAID PO      Take by mouth daily    AA (alopecia areata)       simvastatin 20 MG tablet    ZOCOR    180 tablet    Take 2 tablets (40 mg) by mouth At Bedtime    AA (alopecia areata)

## 2018-11-01 ENCOUNTER — OFFICE VISIT (OUTPATIENT)
Dept: DERMATOLOGY | Facility: CLINIC | Age: 28
End: 2018-11-01
Payer: COMMERCIAL

## 2018-11-01 DIAGNOSIS — L63.9 AA (ALOPECIA AREATA): Primary | ICD-10-CM

## 2018-11-01 ASSESSMENT — PAIN SCALES - GENERAL
PAINLEVEL: MILD PAIN (2)
PAINLEVEL: NO PAIN (0)

## 2018-11-01 NOTE — NURSING NOTE
Dermatology Rooming Note    Sarah A Riedell's goals for this visit include:   Chief Complaint   Patient presents with     Hair Loss     Alopecia Areata - notes re-growth, no new spots.     Sandy Dutton, CMA

## 2018-11-01 NOTE — LETTER
11/1/2018       RE: Sarah A Riedell  [address]  [facility] [address]     Dear Colleague,    Thank you for referring your patient, Sarah A Riedell, to the [facility] DERMATOLOGY at [facility]. Please see a copy of my visit note below.    [facility] Dermatology Note  Encounter Date: Nov 1, 2018    CC:   Chief Complaint   Patient presents with     Hair Loss     Alopecia Areata - notes re-growth, no new spots.     Dermatology Problem List:  1. Alopecia areata, previously focal patchy disease that responded well to ILK but around April 2017 developed diffuse shedding in context of recent IUD placement (~Nov 2016). IUD now removed.  - Current rx: nbUVB 3x per week + ILK; pending participation in GI fecal transplant/microbiome study; consider resuming Rogaine given vellus hairs at sites of ILK  - Prior rx: prednisone course May-August 2017 with cessation of acute flare, ketoconazole shampoo, minoxidil, and tacrolimus for eyebrows, clobetasol shampoo, fexofenadine, simvastatin/ezetimbe briefly, topical GAYATHRI inhibitor in trial at [place] for 2-3 months with no response.  - Labs 5/2017 with significant iron deficiency (ferritin 4), started on iron supplementation, repeat iron studies 10/2018 with ferritin 24.    History of Present Illness:  Sarah A Riedell is a 28 year old woman who presents as a follow-up for alopecia areata. She was was last seen 10/4/18 at which time she underwent ILK to central frontal scalp. She was continued on home nbUVB and is awaiting her kit for the GI fecal transplant/microbiome study.    Since her last visit, she continues to note small shailesh of regrowth only at sites of injection on scalp. No regrowth on eyebrows, eyelashes, arms, legs. Has been on nbUVB three times per week since Sept 10 and is now up to 40 seconds. Tolerating well. She continues the autoimmune protocol diet and is still on iron supplementation. Hasn't heard  anything back from GI team yet on their study but looks forward to this. Otherwise is in her usual state of health today. No additional skin concerns.     Past Medical History:   Past Medical History:   Diagnosis Date     Fibroadenoma of breast      Past Surgical History:  Past Surgical History:   Procedure Laterality Date     BREAST SURGERY      lump removed from left breast     Social History:  The patient works in [employer].     Family History:  Family History   Problem Relation Age of Onset     Asthma Mother      Hypertension Father      Cancer Paternal Grandmother      leukemia     Thyroid Disease Sister      Breast Cancer No family hx of      Melanoma No family hx of      Skin Cancer No family hx of      Medications:  Current Outpatient Prescriptions   Medication Sig Dispense Refill     clobetasol propionate 0.05 % SHAM Apply sparingly to dry scalp once daily as needed.  Leave in place for 15 minutes then add water, lather and rinse thoroughly. 1 Bottle 3     ezetimibe (ZETIA) 10 MG tablet Take 1 tablet (10 mg) by mouth daily 90 tablet 1     fexofenadine (ALLEGRA) 180 MG tablet Take 1 tablet (180 mg) by mouth daily 90 tablet 1     KARIVA 0.15-0.02/0.01 MG (/) per tablet TAKE 1 TABLET BY MOUTH EVERY DAY 28 tablet 0     NONFORMULARY Please dispense one wig prosthesis. 1 each 3     Prenatal Multivit-Min-Fe-FA (PRE- PO) Take by mouth daily       simvastatin (ZOCOR) 20 MG tablet Take 2 tablets (40 mg) by mouth At Bedtime 180 tablet 1     Allergies:  Allergies   Allergen Reactions     No Clinical Screening - See Comments Unknown     duracef     Review of Systems:  Skin: The patient denies any new rash, pruritus, or lesions that are symptomatic, changing or bleeding, except as per HPI.  Constitutional: Otherwise in usual state of health.    Physical exam:  GEN: This is a well developed, well-nourished female in no acute distress, in a pleasant mood.    SKIN:  Focused examination of the face and scalp was  "performed.  - Harris Skin Type I-II  - Scattered on the frontal scalp, where we have been performing ILK, there are many small divets within which there are several 1-2 mm vellus hairs.  - Eyebrows, eyelashes absent.    Impression/Plan:  1. Alopecia universalis, persistent. Patient still has severe disease with only small shailesh of vellus hairs at sites of ILK injections, where she additionally has notable atrophy. She has also tried many prior treatments to date without much improvement in her disease. At this time, she is very interested in the prospect of fecal transplantation/their microbome study and hopes to hear from the GI team soon. She would prefer to try this option, that might \"fix\" the problem, versus an alternate medication like tofacitinib that would just be \"putting on a band-aid\". While she waits to hear from GI team, she would like to continue ILK as a temporizing measure. We further discussed addition of Rogaine at this time which may be helpful for vellus hairs and she will consider. I will reach out to Dr. Edwards to see if there are any updates on the GI study.  Kenalog intralesional injection procedure note: After verbal consent and discussion of risks including but not limited to atrophy, pain, and bruising, time out was performed, the patient underwent positioning and the area was prepped with isopropyl alcohol, 1.9 total cc of Kenalog 10 mg/cc was injected into ~20 sites on the bilateral parietal scalp.  The patient tolerated the procedure well and left the Dermatology clinic in good condition.    Continue home nbUVB three times per week.    Awaiting kit for GI microbiome/fecal transplantation study. I will reach out to Dr. Edwards today to see if there are any updates with this.    2. Iron deficiency without anemia. Ferritin was 4 in 5/2017, now improved to 24 on iron supplementation. Continue.    CC Alta Sotelo  Follow-up in 4-6 weeks, earlier for new or changing lesions. "     Danae Fall MD  Medicine-Dermatology PGY-5  [phone]    Dr. Lisa Desouza staffed the patient.    Staff Involved:  Resident(Danae Fall)/Staff(as above)    I have personally examined this patient and agree with Dr. Fall's documentation and plan of care. I have reviewed and amended the resident's note above. The documentation accurately reflects my clinical observations, diagnoses, treatment and follow-up plans.I was present for key portions of the procedure.        Lisa Desouza MD  Dermatology Staff      Danae Fall MD

## 2018-11-01 NOTE — NURSING NOTE
Drug Administration Record    Drug Name: triamcinolone acetonide(kenalog)  Dose: 1.9mL of triamcinolone 10mg/mL, 19mg dose  Route administered: ID  NDC #: Kenalog-10 (4447-7500-58)  Amount of waste(mL):3.1  Reason for waste: Single use vial    LOT #: [id number]  SITE: [facility]  : [employer]  EXPIRATION DATE: 5/2020

## 2018-11-01 NOTE — MR AVS SNAPSHOT
After Visit Summary   11/1/2018    Sarah A Riedell    MRN: [id number]           Patient Information     Date Of Birth          1990        Visit Information        Provider Department      11/1/2018 8:20 AM Danae Fall MD [facility] Dermatology        Today's Diagnoses     AA (alopecia areata)    -  1       Follow-ups after your visit        Follow-up notes from your care team     Return in about 6 weeks (around 12/13/2018).      Your next 10 appointments already scheduled     Dec 13, 2018  7:20 AM CST   (Arrive by 7:05 AM)   Return Visit with Danae Fall MD   [facility] Dermatology ([facility])    [address] [facility]  [address] [facility] [address] [phone]            Jan 03, 2019  8:20 AM CST   (Arrive by 8:05 AM)   Return Visit with Danae Fall MD   [facility] Dermatology ([facility])    [address] [facility]  [address] [facility] [address] [phone]              Who to contact     Please call your clinic at [phone] to:    Ask questions about your health    Make or cancel appointments    Discuss your medicines    Learn about your test results    Speak to your doctor            Additional Information About Your Visit        ShippoharDocbookMD Information     Plan A Drink gives you secure access to your electronic health record. If you see a primary care provider, you can also send messages to your care team and make appointments. If you have questions, please call your primary care clinic.  If you do not have a primary care provider, please call [phone] and they will assist you.      [employer] is an electronic gateway that provides easy, online access to your medical records. With [employer]ink, you can request a clinic appointment, read your test results, renew a prescription or communicate with your care team.     To access your existing account, please contact your [facility] or call  [phone] for assistance.        Care EveryWhere ID     This is your Care EveryWhere ID. This could be used by other organizations to access your [place] medical records  [id number]         Blood Pressure from Last 3 Encounters:   05/25/17 122/80   10/02/15 106/69   12/04/14 100/76    Weight from Last 3 Encounters:   10/02/15 71.4 kg (157 lb 6.4 oz)   12/04/14 71.8 kg (158 lb 6.4 oz)   06/12/14 71.7 kg (158 lb)              We Performed the Following     INJECTION INTO SKIN LESIONS >7          Today's Medication Changes          These changes are accurate as of 11/1/18 11:59 PM.  If you have any questions, ask your nurse or doctor.               Start taking these medicines.        Dose/Directions    triamcinolone acetonide 10 MG/ML injection   Commonly known as:  KENALOG   Used for:  AA (alopecia areata)   Started by:  Danae Fall MD        Dose:  10 mg   Inject 1 mL (10 mg) into the skin once for 1 dose   Quantity:  1.9 mL   Refills:  0            Where to get your medicines      Some of these will need a paper prescription and others can be bought over the counter.  Ask your nurse if you have questions.     You don't need a prescription for these medications     triamcinolone acetonide 10 MG/ML injection                Primary Care Provider Office Phone # Fax #    Alta ESTIVEN Zapata [phone] [address] [address]        Equal Access to Services     [place] AH: Hadii mildred jiango Yousif, waaxda luqyoli, qaybta kaalmada rcahel flynn ademary mann . So [facility] [phone].    ATENCIÓN: Si habla trevon, tiene a reyes disposición servicios gratuitos de asistencia lingüística. Llame al [phone].    We comply with applicable federal civil rights laws and [place] laws. We do not discriminate on the basis of race, color, national origin, age, disability, sex, sexual orientation, or gender identity.            Thank  you!     Thank you for choosing [facility] DERMATOLOGY  for your care. Our goal is always to provide you with excellent care. Hearing back from our patients is one way we can continue to improve our services. Please take a few minutes to complete the written survey that you may receive in the mail after your visit with us. Thank you!             Your Updated Medication List - Protect others around you: Learn how to safely use, store and throw away your medicines at www.disposemymeds.org.          This list is accurate as of 18 11:59 PM.  Always use your most recent med list.                   Brand Name Dispense Instructions for use Diagnosis    clobetasol propionate 0.05 % Sham     1 Bottle    Apply sparingly to dry scalp once daily as needed.  Leave in place for 15 minutes then add water, lather and rinse thoroughly.    AA (alopecia areata)       ezetimibe 10 MG tablet    ZETIA    90 tablet    Take 1 tablet (10 mg) by mouth daily    AA (alopecia areata)       fexofenadine 180 MG tablet    ALLEGRA    90 tablet    Take 1 tablet (180 mg) by mouth daily    AA (alopecia areata)       KARIVA 0.15-0.02/0.01 MG (/) per tablet   Generic drug:  desogestrel-ethinyl estradiol     28 tablet    TAKE 1 TABLET BY MOUTH EVERY DAY    Encounter for contraceptive management, unspecified contraceptive encounter type       NONFORMULARY     1 each    Please dispense one wig prosthesis.    Alopecia areata       PRE- PO      Take by mouth daily    AA (alopecia areata)       simvastatin 20 MG tablet    ZOCOR    180 tablet    Take 2 tablets (40 mg) by mouth At Bedtime    AA (alopecia areata)       triamcinolone acetonide 10 MG/ML injection    KENALOG    1.9 mL    Inject 1 mL (10 mg) into the skin once for 1 dose    AA (alopecia areata)

## 2018-11-02 NOTE — PROGRESS NOTES
[facility] Dermatology Note  Encounter Date: Nov 1, 2018    CC:   Chief Complaint   Patient presents with     Hair Loss     Alopecia Areata - notes re-growth, no new spots.     Dermatology Problem List:  1. Alopecia areata, previously focal patchy disease that responded well to ILK but around April 2017 developed diffuse shedding in context of recent IUD placement (~Nov 2016). IUD now removed.  - Current rx: nbUVB 3x per week + ILK; pending participation in GI fecal transplant/microbiome study; consider resuming Rogaine given vellus hairs at sites of ILK  - Prior rx: prednisone course May-August 2017 with cessation of acute flare, ketoconazole shampoo, minoxidil, and tacrolimus for eyebrows, clobetasol shampoo, fexofenadine, simvastatin/ezetimbe briefly, topical GAYATHRI inhibitor in trial at [place] for 2-3 months with no response.  - Labs 5/2017 with significant iron deficiency (ferritin 4), started on iron supplementation, repeat iron studies 10/2018 with ferritin 24.    History of Present Illness:  Sarah A Riedell is a 28 year old woman who presents as a follow-up for alopecia areata. She was was last seen 10/4/18 at which time she underwent ILK to central frontal scalp. She was continued on home nbUVB and is awaiting her kit for the GI fecal transplant/microbiome study.    Since her last visit, she continues to note small shailesh of regrowth only at sites of injection on scalp. No regrowth on eyebrows, eyelashes, arms, legs. Has been on nbUVB three times per week since Sept 10 and is now up to 40 seconds. Tolerating well. She continues the autoimmune protocol diet and is still on iron supplementation. Hasn't heard anything back from GI team yet on their study but looks forward to this. Otherwise is in her usual state of health today. No additional skin concerns.     Past Medical History:   Past Medical History:   Diagnosis Date     Fibroadenoma of breast      Past Surgical History:  Past Surgical  History:   Procedure Laterality Date     BREAST SURGERY      lump removed from left breast     Social History:  The patient works in [email or web address].     Family History:  Family History   Problem Relation Age of Onset     Asthma Mother      Hypertension Father      Cancer Paternal Grandmother      leukemia     Thyroid Disease Sister      Breast Cancer No family hx of      Melanoma No family hx of      Skin Cancer No family hx of      Medications:  Current Outpatient Prescriptions   Medication Sig Dispense Refill     clobetasol propionate 0.05 % SHAM Apply sparingly to dry scalp once daily as needed.  Leave in place for 15 minutes then add water, lather and rinse thoroughly. 1 Bottle 3     ezetimibe (ZETIA) 10 MG tablet Take 1 tablet (10 mg) by mouth daily 90 tablet 1     fexofenadine (ALLEGRA) 180 MG tablet Take 1 tablet (180 mg) by mouth daily 90 tablet 1     KARIVA 0.15-0.02/0.01 MG (/) per tablet TAKE 1 TABLET BY MOUTH EVERY DAY 28 tablet 0     NONFORMULARY Please dispense one wig prosthesis. 1 each 3     Prenatal Multivit-Min-Fe-FA (PRE- PO) Take by mouth daily       simvastatin (ZOCOR) 20 MG tablet Take 2 tablets (40 mg) by mouth At Bedtime 180 tablet 1     Allergies:  Allergies   Allergen Reactions     No Clinical Screening - See Comments Unknown     duracef     Review of Systems:  Skin: The patient denies any new rash, pruritus, or lesions that are symptomatic, changing or bleeding, except as per HPI.  Constitutional: Otherwise in usual state of health.    Physical exam:  GEN: This is a well developed, well-nourished female in no acute distress, in a pleasant mood.    SKIN:  Focused examination of the face and scalp was performed.  - Harris Skin Type I-II  - Scattered on the frontal scalp, where we have been performing ILK, there are many small divets within which there are several 1-2 mm vellus hairs.  - Eyebrows, eyelashes absent.    Impression/Plan:  1. Alopecia universalis, persistent. Patient  "still has severe disease with only small shailesh of vellus hairs at sites of ILK injections, where she additionally has notable atrophy. She has also tried many prior treatments to date without much improvement in her disease. At this time, she is very interested in the prospect of fecal transplantation/their microbome study and hopes to hear from the GI team soon. She would prefer to try this option, that might \"fix\" the problem, versus an alternate medication like tofacitinib that would just be \"putting on a band-aid\". While she waits to hear from GI team, she would like to continue ILK as a temporizing measure. We further discussed addition of Rogaine at this time which may be helpful for vellus hairs and she will consider. I will reach out to Dr. Edwards to see if there are any updates on the GI study.  Kenalog intralesional injection procedure note: After verbal consent and discussion of risks including but not limited to atrophy, pain, and bruising, time out was performed, the patient underwent positioning and the area was prepped with isopropyl alcohol, 1.9 total cc of Kenalog 10 mg/cc was injected into ~20 sites on the bilateral parietal scalp.  The patient tolerated the procedure well and left the Dermatology clinic in good condition.    Continue home nbUVB three times per week.    Awaiting kit for GI microbiome/fecal transplantation study. I will reach out to Dr. Edwards today to see if there are any updates with this.    2. Iron deficiency without anemia. Ferritin was 4 in 5/2017, now improved to 24 on iron supplementation. Continue.    CC Alta Sotelo  Follow-up in 4-6 weeks, earlier for new or changing lesions.     Danae Fall MD  Medicine-Dermatology PGY-5  [phone]    Dr. Lisa Desouza staffed the patient.    Staff Involved:  Resident(Danae Fall)/Staff(as above)    I have personally examined this patient and agree with Dr. Fall's documentation and plan of care. I have " reviewed and amended the resident's note above. The documentation accurately reflects my clinical observations, diagnoses, treatment and follow-up plans.I was present for key portions of the procedure.        Lisa Desouza MD  Dermatology Staff

## 2018-11-11 ENCOUNTER — MYC MEDICAL ADVICE (OUTPATIENT)
Dept: DERMATOLOGY | Facility: CLINIC | Age: 28
End: 2018-11-11

## 2018-11-14 ENCOUNTER — TELEPHONE (OUTPATIENT)
Dept: DERMATOLOGY | Facility: CLINIC | Age: 28
End: 2018-11-14

## 2018-11-14 NOTE — TELEPHONE ENCOUNTER
[facility] Call Center    Phone Message    May a detailed message be left on voicemail: yes    Reason for Call: Other: Pt called to say that she has been getting a code:  on her Light treatment machine and it is stating that she needs a medication refills please contact pt asap [phone].     Action Taken: Message routed to:  [facility]

## 2018-11-14 NOTE — TELEPHONE ENCOUNTER
Spoke to Lucrecia.  Gave her new code (4732) and number treatments (150).  She acknowledged and understood.  All question answered.    Benjamin Sanchez LPN

## 2018-11-14 NOTE — TELEPHONE ENCOUNTER
LVM for Lucrecia to call back in regards to refill, clinic phone number provided.    Note: new code 4732 and 150 treatments.    Benjamin Sanchez LPN

## 2018-12-13 ENCOUNTER — OFFICE VISIT (OUTPATIENT)
Dept: DERMATOLOGY | Facility: CLINIC | Age: 28
End: 2018-12-13
Payer: COMMERCIAL

## 2018-12-13 DIAGNOSIS — L63.9 ALOPECIA AREATA: ICD-10-CM

## 2018-12-13 DIAGNOSIS — L63.9 ALOPECIA AREATA: Primary | ICD-10-CM

## 2018-12-13 LAB — DEPRECATED CALCIDIOL+CALCIFEROL SERPL-MC: 31 UG/L (ref 20–75)

## 2018-12-13 RX ORDER — CLOBETASOL PROPIONATE 0.5 MG/G
CREAM TOPICAL
Qty: 120 G | Refills: 1 | Status: SHIPPED | OUTPATIENT
Start: 2018-12-13 | End: 2019-06-06

## 2018-12-13 ASSESSMENT — PAIN SCALES - GENERAL: PAINLEVEL: NO PAIN (0)

## 2018-12-13 NOTE — LETTER
12/13/2018       RE: Sarah A Riedell  [address]  [facility] [address]     Dear Colleague,    Thank you for referring your patient, Sarah A Riedell, to the [facility] DERMATOLOGY at [facility]. Please see a copy of my visit note below.    Prior to injection, verified patient identity using patient's name and date of birth.  Due to injection administration, patient instructed to remain in clinic for 15 minutes  afterwards, and to report any adverse reaction to me immediately.    Triamcinolone    Drug Amount Wasted:  2.6 mL  Vial/Syringe: Multi dose vial    [facility] Dermatology Note    Dermatology Problem List:  1. Alopecia areata, previously focal patchy disease that responded well to ILK but around April 2017 developed diffuse shedding in context of recent IUD placement (~Nov 2016). IUD now removed.  - Current rx: nbUVB 3x per week + ILK; trial of Rogaine starting 11/1/18, clobetasol cream under occlusion nightly 3x weekly starting 12/13/2018; pending participation in GI fecal transplant/microbiome study; consider resuming Rogaine given vellus hairs at sites of ILK  - Prior rx: prednisone course May-August 2017 with cessation of acute flare, ketoconazole shampoo, minoxidil, and tacrolimus for eyebrows, clobetasol shampoo, fexofenadine, simvastatin/ezetimbe briefly, topical GAYATHRI inhibitor in trial at [address] for 2-3 months with no response.  - Labs 5/2017 with significant iron deficiency (ferritin 4), started on iron supplementation, repeat iron studies 10/2018 with ferritin 24.    CC:   Chief Complaint   Patient presents with     Hair Loss     Alopecia Areata - Lucrecia notes re-growth, but she also notes more loss as well.     Encounter Date: December 13, 2018    History of Present Illness:  Sarah A Riedell is a 28 year old woman who presents as a follow-up for alopecia areata. She was was last seen 11/1/18 at which time she underwent ILK to  bilateral parietal scalp. She was continued on home nbUVB, advised to trial Rogaine to see if this helped vellus hair growth, and is awaiting her kit for the GI fecal transplant/microbiome study.    Since her last visit, she continues to note small shailesh of regrowth only at sites of injection on scalp however she is now noting that prior areas we have not treated recently have fallen out.. No regrowth on eyebrows, eyelashes, arms, legs. Has been on nbUVB three times per week since Sept 10 and is currently at 40 seconds. Tolerating well. Tried rogaine for the past 6 weeks but hasn't noted changes yet. Hasn't heard anything back from GI team yet on their study but looks forward to this. Otherwise is in her usual state of health today. Wonders about her prior labs which showed a slightly low SHBG. No additional skin concerns.     Past Medical History:   Past Medical History:   Diagnosis Date     Fibroadenoma of breast      Past Surgical History:  Past Surgical History:   Procedure Laterality Date     BREAST SURGERY      lump removed from left breast     Social History:  The patient works in [employer].     Family History:  Family History   Problem Relation Age of Onset     Asthma Mother      Hypertension Father      Cancer Paternal Grandmother         leukemia     Thyroid Disease Sister      Breast Cancer No family hx of      Melanoma No family hx of      Skin Cancer No family hx of      Medications:  Current Outpatient Medications   Medication Sig Dispense Refill     clobetasol (TEMOVATE) 0.05 % external cream Apply to scalp three times nightly, cover with saran wrap/shower cap, then rinse off in morning. 120 g 1     clobetasol propionate 0.05 % SHAM Apply sparingly to dry scalp once daily as needed.  Leave in place for 15 minutes then add water, lather and rinse thoroughly. 1 Bottle 3     ezetimibe (ZETIA) 10 MG tablet Take 1 tablet (10 mg) by mouth daily 90 tablet 1     fexofenadine (ALLEGRA) 180 MG tablet Take 1  "tablet (180 mg) by mouth daily 90 tablet 1     KARIVA 0.15-0.02/0.01 MG () per tablet TAKE 1 TABLET BY MOUTH EVERY DAY 28 tablet 0     NONFORMULARY Please dispense one wig prosthesis. 1 each 3     Prenatal Multivit-Min-Fe-FA (PRE- PO) Take by mouth daily       simvastatin (ZOCOR) 20 MG tablet Take 2 tablets (40 mg) by mouth At Bedtime 180 tablet 1     Allergies:  Allergies   Allergen Reactions     No Clinical Screening - See Comments Unknown     duracef     Review of Systems:  Skin: The patient denies any new rash, pruritus, or lesions that are symptomatic, changing or bleeding, except as per HPI.  Constitutional: Otherwise in usual state of health.    Physical exam:  GEN: This is a well developed, well-nourished female in no acute distress, in a pleasant mood.    SKIN:  Focused examination of the face and scalp was performed.  - Harris Skin Type I-II  - Scattered on the scalp, particularly the bilateral parietal scalp where we recently performed ILK, there are many small divets within which there are several 1-2 mm vellus hairs.  - Eyebrows, eyelashes absent.    Impression/Plan:  1. Alopecia universalis, persistent, steroid-responsive. Patient continues to has severe disease with only small shailesh of vellus hairs at sites of ILK injections, where she additionally has notable atrophy. She has also tried many prior treatments to date without much improvement in her disease including a recent trial of Rogaine x6 weeks without further growth of vellus hairs. At this time, she continues to remain very interested in the prospect of fecal transplantation/their microbome study and hopes to hear from the GI team soon. She would prefer to try this option, that might \"fix\" the problem, versus an alternate medication like tofacitinib that would just be \"putting on a band-aid\". While she waits to hear from GI team, she would like to continue ILK as a temporizing measure. Given steroid-responsiveness, we will also " trial clobetasol under occlusion as below. We will also recheck labs today as below. It does not seem that the Rogaine is helping much but will continue for an additional 6 weeks to see if this results in any further improvement. If not, would be okay to discontinue at next visit. I will reach out to Dr. Edwards to see if there are any updates on the GI study.  Kenalog intralesional injection procedure note: After verbal consent and discussion of risks including but not limited to atrophy, pain, and bruising, time out was performed, the patient underwent positioning and the area was prepped with isopropyl alcohol, 2.4 total cc of Kenalog 10 mg/cc was injected into ~24 sites on the bilateral parietal scalp.  The patient tolerated the procedure well and left the Dermatology clinic in good condition.    Continue home nbUVB three times per week.  Continue Rogaine daily until next visit. If no improvement noted, consider discontinuation. Possibly the use of Rogaine also led to shedding of prior vellus hairs.  Start clobetasol cream under occlusion 3x weekly.  Labs today: vitamin D, DHEA-S, free/total testosterone.    Awaiting kit for GI microbiome/fecal transplantation study. I will reach out to Dr. Edwards today to see if there are any updates with this.    2. Iron deficiency without anemia. Ferritin was 4 in 5/2017, now improved to 24 on iron supplementation. Continue.    CC Alta Sotelo  Follow-up in 6 weeks, earlier for new or changing lesions.     Danae Fall MD  Medicine-Dermatology PGY-5  [phone]    Dr. Sebastien Gottlieb staffed the patient.    Staff Involved:  Resident(Danae Fall)/Staff(as above)    I talked with and examined Sarah A Riedell and I agree with the assessment and the plan. I was present for the injection  procedure. PRUDENCE Gottlieb MD.      Danae Fall MD

## 2018-12-13 NOTE — NURSING NOTE
Dermatology Rooming Note    Sarah A Riedell's goals for this visit include:   Chief Complaint   Patient presents with     Hair Loss     Alopecia Areata - Lucrecia notes re-growth, but she also notes more loss as well.     Sandy Dutton, CMA

## 2018-12-13 NOTE — PATIENT INSTRUCTIONS
We will do the injections today and check labs.    Please start clobetasol cream three times nightly under saran wrap.    Check in about 6 weeks, sooner if concerns.

## 2018-12-13 NOTE — PROGRESS NOTES
Prior to injection, verified patient identity using patient's name and date of birth.  Due to injection administration, patient instructed to remain in clinic for 15 minutes  afterwards, and to report any adverse reaction to me immediately.    Triamcinolone    Drug Amount Wasted:  2.6 mL  Vial/Syringe: Multi dose vial

## 2018-12-14 LAB
DHEA-S SERPL-MCNC: 82 UG/DL (ref 35–430)
SHBG SERPL-SCNC: 149 NMOL/L (ref 30–135)
TESTOST FREE SERPL-MCNC: 0.04 NG/DL (ref 0.08–0.74)
TESTOST SERPL-MCNC: 16 NG/DL (ref 8–60)

## 2018-12-14 NOTE — PROGRESS NOTES
[facility] Dermatology Note    Dermatology Problem List:  1. Alopecia areata, previously focal patchy disease that responded well to ILK but around April 2017 developed diffuse shedding in context of recent IUD placement (~Nov 2016). IUD now removed.  - Current rx: nbUVB 3x per week + ILK; trial of Rogaine starting 11/1/18, clobetasol cream under occlusion nightly 3x weekly starting 12/13/2018; pending participation in GI fecal transplant/microbiome study; consider resuming Rogaine given vellus hairs at sites of ILK  - Prior rx: prednisone course May-August 2017 with cessation of acute flare, ketoconazole shampoo, minoxidil, and tacrolimus for eyebrows, clobetasol shampoo, fexofenadine, simvastatin/ezetimbe briefly, topical GAYATHRI inhibitor in trial at [place] for 2-3 months with no response.  - Labs 5/2017 with significant iron deficiency (ferritin 4), started on iron supplementation, repeat iron studies 10/2018 with ferritin 24.    CC:   Chief Complaint   Patient presents with     Hair Loss     Alopecia Areata - Lucrecia notes re-growth, but she also notes more loss as well.     Encounter Date: December 13, 2018    History of Present Illness:  Sarah A Riedell is a 28 year old woman who presents as a follow-up for alopecia areata. She was was last seen 11/1/18 at which time she underwent ILK to bilateral parietal scalp. She was continued on home nbUVB, advised to trial Rogaine to see if this helped vellus hair growth, and is awaiting her kit for the GI fecal transplant/microbiome study.    Since her last visit, she continues to note small shailesh of regrowth only at sites of injection on scalp however she is now noting that prior areas we have not treated recently have fallen out.. No regrowth on eyebrows, eyelashes, arms, legs. Has been on nbUVB three times per week since Sept 10 and is currently at 40 seconds. Tolerating well. Tried rogaine for the past 6 weeks but hasn't noted changes yet. Hasn't  heard anything back from GI team yet on their study but looks forward to this. Otherwise is in her usual state of health today. Wonders about her prior labs which showed a slightly low SHBG. No additional skin concerns.     Past Medical History:   Past Medical History:   Diagnosis Date     Fibroadenoma of breast      Past Surgical History:  Past Surgical History:   Procedure Laterality Date     BREAST SURGERY      lump removed from left breast     Social History:  The patient works in [employer].     Family History:  Family History   Problem Relation Age of Onset     Asthma Mother      Hypertension Father      Cancer Paternal Grandmother         leukemia     Thyroid Disease Sister      Breast Cancer No family hx of      Melanoma No family hx of      Skin Cancer No family hx of      Medications:  Current Outpatient Medications   Medication Sig Dispense Refill     clobetasol (TEMOVATE) 0.05 % external cream Apply to scalp three times nightly, cover with saran wrap/shower cap, then rinse off in morning. 120 g 1     clobetasol propionate 0.05 % SHAM Apply sparingly to dry scalp once daily as needed.  Leave in place for 15 minutes then add water, lather and rinse thoroughly. 1 Bottle 3     ezetimibe (ZETIA) 10 MG tablet Take 1 tablet (10 mg) by mouth daily 90 tablet 1     fexofenadine (ALLEGRA) 180 MG tablet Take 1 tablet (180 mg) by mouth daily 90 tablet 1     KARIVA 0.15-0.02/0.01 MG (21/5) per tablet TAKE 1 TABLET BY MOUTH EVERY DAY 28 tablet 0     NONFORMULARY Please dispense one wig prosthesis. 1 each 3     Prenatal Multivit-Min-Fe-FA (PRE-ZAID PO) Take by mouth daily       simvastatin (ZOCOR) 20 MG tablet Take 2 tablets (40 mg) by mouth At Bedtime 180 tablet 1     Allergies:  Allergies   Allergen Reactions     No Clinical Screening - See Comments Unknown     duracef     Review of Systems:  Skin: The patient denies any new rash, pruritus, or lesions that are symptomatic, changing or bleeding, except as per  "HPI.  Constitutional: Otherwise in usual state of health.    Physical exam:  GEN: This is a well developed, well-nourished female in no acute distress, in a pleasant mood.    SKIN:  Focused examination of the face and scalp was performed.  - Harris Skin Type I-II  - Scattered on the scalp, particularly the bilateral parietal scalp where we recently performed ILK, there are many small divets within which there are several 1-2 mm vellus hairs.  - Eyebrows, eyelashes absent.    Impression/Plan:  1. Alopecia universalis, persistent, steroid-responsive. Patient continues to has severe disease with only small shailesh of vellus hairs at sites of ILK injections, where she additionally has notable atrophy. She has also tried many prior treatments to date without much improvement in her disease including a recent trial of Rogaine x6 weeks without further growth of vellus hairs. At this time, she continues to remain very interested in the prospect of fecal transplantation/their microbome study and hopes to hear from the GI team soon. She would prefer to try this option, that might \"fix\" the problem, versus an alternate medication like tofacitinib that would just be \"putting on a band-aid\". While she waits to hear from GI team, she would like to continue ILK as a temporizing measure. Given steroid-responsiveness, we will also trial clobetasol under occlusion as below. We will also recheck labs today as below. It does not seem that the Rogaine is helping much but will continue for an additional 6 weeks to see if this results in any further improvement. If not, would be okay to discontinue at next visit. I will reach out to Dr. Edwards to see if there are any updates on the GI study.  Kenalog intralesional injection procedure note: After verbal consent and discussion of risks including but not limited to atrophy, pain, and bruising, time out was performed, the patient underwent positioning and the area was prepped with " isopropyl alcohol, 2.4 total cc of Kenalog 10 mg/cc was injected into ~24 sites on the bilateral parietal scalp.  The patient tolerated the procedure well and left the Dermatology clinic in good condition.    Continue home nbUVB three times per week.  Continue Rogaine daily until next visit. If no improvement noted, consider discontinuation. Possibly the use of Rogaine also led to shedding of prior vellus hairs.  Start clobetasol cream under occlusion 3x weekly.  Labs today: vitamin D, DHEA-S, free/total testosterone.    Awaiting kit for GI microbiome/fecal transplantation study. I will reach out to Dr. Edwards today to see if there are any updates with this.    2. Iron deficiency without anemia. Ferritin was 4 in 5/2017, now improved to 24 on iron supplementation. Continue.    CC Alta Sotelo  Follow-up in 6 weeks, earlier for new or changing lesions.     Danae Fall MD  Medicine-Dermatology PGY-5  [phone]    Dr. Sebastien Gottlieb staffed the patient.    Staff Involved:  Resident(Danae Fall)/Staff(as above)

## 2018-12-17 ENCOUNTER — MYC MEDICAL ADVICE (OUTPATIENT)
Dept: DERMATOLOGY | Facility: CLINIC | Age: 28
End: 2018-12-17

## 2018-12-17 DIAGNOSIS — L63.9 ALOPECIA AREATA: ICD-10-CM

## 2018-12-23 RX ORDER — KETOCONAZOLE 20 MG/ML
SHAMPOO TOPICAL
Qty: 120 ML | Refills: 11 | Status: CANCELLED | OUTPATIENT
Start: 2018-12-23

## 2018-12-31 NOTE — PROGRESS NOTES
I talked with and examined Lucrecia MADRIGAL Riedell and I agree with the assessment and the plan. I was present for the injection  procedure. PRUDENCE Gottlieb MD.

## 2019-01-24 ENCOUNTER — TELEPHONE (OUTPATIENT)
Dept: DERMATOLOGY | Facility: CLINIC | Age: 29
End: 2019-01-24

## 2019-01-24 ENCOUNTER — OFFICE VISIT (OUTPATIENT)
Dept: DERMATOLOGY | Facility: CLINIC | Age: 29
End: 2019-01-24
Payer: COMMERCIAL

## 2019-01-24 DIAGNOSIS — Z51.81 MEDICATION MONITORING ENCOUNTER: ICD-10-CM

## 2019-01-24 DIAGNOSIS — L63.9 ALOPECIA AREATA: ICD-10-CM

## 2019-01-24 DIAGNOSIS — Z51.81 MEDICATION MONITORING ENCOUNTER: Primary | ICD-10-CM

## 2019-01-24 LAB
ALBUMIN SERPL-MCNC: 3.8 G/DL (ref 3.4–5)
ALBUMIN UR-MCNC: NEGATIVE MG/DL
ALP SERPL-CCNC: 59 U/L (ref 40–150)
ALT SERPL W P-5'-P-CCNC: 21 U/L (ref 0–50)
AMORPH CRY #/AREA URNS HPF: ABNORMAL /HPF
ANION GAP SERPL CALCULATED.3IONS-SCNC: 6 MMOL/L (ref 3–14)
APPEARANCE UR: ABNORMAL
AST SERPL W P-5'-P-CCNC: 16 U/L (ref 0–45)
BASOPHILS # BLD AUTO: 0.1 10E9/L (ref 0–0.2)
BASOPHILS NFR BLD AUTO: 1.1 %
BILIRUB SERPL-MCNC: 0.4 MG/DL (ref 0.2–1.3)
BILIRUB UR QL STRIP: NEGATIVE
BUN SERPL-MCNC: 16 MG/DL (ref 7–30)
CALCIUM SERPL-MCNC: 8.5 MG/DL (ref 8.5–10.1)
CHLORIDE SERPL-SCNC: 104 MMOL/L (ref 94–109)
CHOLEST SERPL-MCNC: 163 MG/DL
CO2 SERPL-SCNC: 28 MMOL/L (ref 20–32)
COLOR UR AUTO: YELLOW
CREAT SERPL-MCNC: 0.95 MG/DL (ref 0.52–1.04)
DIFFERENTIAL METHOD BLD: NORMAL
EOSINOPHIL # BLD AUTO: 0.1 10E9/L (ref 0–0.7)
EOSINOPHIL NFR BLD AUTO: 1.1 %
ERYTHROCYTE [DISTWIDTH] IN BLOOD BY AUTOMATED COUNT: 11.9 % (ref 10–15)
GFR SERPL CREATININE-BSD FRML MDRD: 81 ML/MIN/{1.73_M2}
GLUCOSE SERPL-MCNC: 81 MG/DL (ref 70–99)
GLUCOSE UR STRIP-MCNC: NEGATIVE MG/DL
HBV SURFACE AB SERPL IA-ACNC: 13.86 M[IU]/ML
HBV SURFACE AG SERPL QL IA: NONREACTIVE
HCT VFR BLD AUTO: 43 % (ref 35–47)
HCV AB SERPL QL IA: NONREACTIVE
HDLC SERPL-MCNC: 56 MG/DL
HGB BLD-MCNC: 13.8 G/DL (ref 11.7–15.7)
HGB UR QL STRIP: NEGATIVE
HIV 1+2 AB+HIV1 P24 AG SERPL QL IA: NONREACTIVE
IMM GRANULOCYTES # BLD: 0 10E9/L (ref 0–0.4)
IMM GRANULOCYTES NFR BLD: 0.2 %
KETONES UR STRIP-MCNC: NEGATIVE MG/DL
LDLC SERPL CALC-MCNC: 96 MG/DL
LEUKOCYTE ESTERASE UR QL STRIP: NEGATIVE
LYMPHOCYTES # BLD AUTO: 1.5 10E9/L (ref 0.8–5.3)
LYMPHOCYTES NFR BLD AUTO: 27.6 %
MCH RBC QN AUTO: 30.7 PG (ref 26.5–33)
MCHC RBC AUTO-ENTMCNC: 32.1 G/DL (ref 31.5–36.5)
MCV RBC AUTO: 96 FL (ref 78–100)
MONOCYTES # BLD AUTO: 0.3 10E9/L (ref 0–1.3)
MONOCYTES NFR BLD AUTO: 6.3 %
NEUTROPHILS # BLD AUTO: 3.4 10E9/L (ref 1.6–8.3)
NEUTROPHILS NFR BLD AUTO: 63.7 %
NITRATE UR QL: NEGATIVE
NONHDLC SERPL-MCNC: 107 MG/DL
NRBC # BLD AUTO: 0 10*3/UL
NRBC BLD AUTO-RTO: 0 /100
PH UR STRIP: 7 PH (ref 5–7)
PLATELET # BLD AUTO: 266 10E9/L (ref 150–450)
POTASSIUM SERPL-SCNC: 3.7 MMOL/L (ref 3.4–5.3)
PROT SERPL-MCNC: 7.1 G/DL (ref 6.8–8.8)
RBC # BLD AUTO: 4.49 10E12/L (ref 3.8–5.2)
RBC #/AREA URNS AUTO: 1 /HPF (ref 0–2)
SODIUM SERPL-SCNC: 139 MMOL/L (ref 133–144)
SOURCE: ABNORMAL
SP GR UR STRIP: 1.02 (ref 1–1.03)
TRIGL SERPL-MCNC: 54 MG/DL
UROBILINOGEN UR STRIP-MCNC: 0 MG/DL (ref 0–2)
WBC # BLD AUTO: 5.4 10E9/L (ref 4–11)
WBC #/AREA URNS AUTO: 1 /HPF (ref 0–5)

## 2019-01-24 ASSESSMENT — PAIN SCALES - GENERAL: PAINLEVEL: NO PAIN (0)

## 2019-01-24 NOTE — PROGRESS NOTES
Drug Administration Record    Prior to injection, verified patient identity using patient's name and date of birth.  Due to injection administration, patient instructed to remain in clinic for 15 minutes  afterwards, and to report any adverse reaction to me immediately.    Drug Name: triamcinolone acetonide(kenalog)  Dose: 3mL of triamcinolone 10mg/mL, 30mg dose  Route administered: ID  NDC #: [id number]: Kenalog-10 ([id number])  Amount of waste(mL):2  Reason for waste: Multi dose vial    LOT #: [id number]  SITE: scalp  : [facility]  EXPIRATION DATE: 6/2020

## 2019-01-24 NOTE — PROGRESS NOTES
[facility] Dermatology Note    Dermatology Problem List:  1. Alopecia areata, previously focal patchy disease that responded well to ILK but around April 2017 developed diffuse shedding in context of recent IUD placement (~Nov 2016). IUD now removed.     - Current rx: Pending tofacitinib PA and baseline labs (1/24/19)          ILK; Continue clobetasol cream under occlusion nightly 3x weekly starting 12/13/2018; pending participation in GI fecal transplant/microbiome study  - Prior rx: prednisone course May-August 2017 with cessation of acute flare, ketoconazole shampoo, minoxidil, and tacrolimus for eyebrows, clobetasol shampoo, fexofenadine, simvastatin/ezetimbe briefly, topical GAYATHRI inhibitor in trial at [place] for 2-3 months with no response.  - Labs 5/2017 with significant iron deficiency (ferritin 4), started on iron supplementation, repeat iron studies 10/2018 with ferritin 24.    CC:   Chief Complaint   Patient presents with     Hair Loss     Alopecia Areata - Lucrecia notes re-growth in some places, and more loss in others.     Encounter Date: 01/24/2019    History of Present Illness:  Sarah A Riedell is a 28 year old woman who presents as a follow-up for alopecia areata. She was was last seen 12/13/18 at which time she underwent ILK to the entire scalp. She recently stopped home nbUVB. She has been only doing clobetasol cream under occlusion. She has not been using Rogaine for a while.       Overall she feels that her hair has been growing back more after the recent diffuse treatment with ILK and with the clobetasol cream. Her body hair is stable and she has not had regrowth of her eyebrows, eyelashes, or body hair. She is still wondering about other treatments including tofacitinib and fecal transplantation.     Past Medical History:   Past Medical History:   Diagnosis Date     Fibroadenoma of breast      Past Surgical History:  Past Surgical History:   Procedure Laterality Date     BREAST  SURGERY      lump removed from left breast     Social History:  The patient works in [employer].     Family History:  Family History   Problem Relation Age of Onset     Asthma Mother      Hypertension Father      Cancer Paternal Grandmother         leukemia     Thyroid Disease Sister      Breast Cancer No family hx of      Melanoma No family hx of      Skin Cancer No family hx of      Medications:  Current Outpatient Medications   Medication Sig Dispense Refill     clobetasol (TEMOVATE) 0.05 % external cream Apply to scalp three times nightly, cover with saran wrap/shower cap, then rinse off in morning. 120 g 1     NONFORMULARY Please dispense one wig prosthesis. 1 each 3     Prenatal Multivit-Min-Fe-FA (PRE- PO) Take by mouth daily       tofacitinib (XELJANZ) 5 MG tablet Take 1 tablet (5 mg) by mouth 2 times daily 180 tablet 3     clobetasol propionate 0.05 % SHAM Apply sparingly to dry scalp once daily as needed.  Leave in place for 15 minutes then add water, lather and rinse thoroughly. (Patient not taking: Reported on 2019) 1 Bottle 3     ezetimibe (ZETIA) 10 MG tablet Take 1 tablet (10 mg) by mouth daily (Patient not taking: Reported on 2019) 90 tablet 1     fexofenadine (ALLEGRA) 180 MG tablet Take 1 tablet (180 mg) by mouth daily (Patient not taking: Reported on 2019) 90 tablet 1     KARIVA 0.15-0.02/0.01 MG () per tablet TAKE 1 TABLET BY MOUTH EVERY DAY (Patient not taking: Reported on 2019) 28 tablet 0     simvastatin (ZOCOR) 20 MG tablet Take 2 tablets (40 mg) by mouth At Bedtime (Patient not taking: Reported on 2019) 180 tablet 1     Allergies:  Allergies   Allergen Reactions     No Clinical Screening - See Comments Unknown     duracef     Review of Systems:  Skin: The patient denies any new rash, pruritus, or lesions that are symptomatic, changing or bleeding, except as per HPI.  Constitutional: Otherwise in usual state of health.    Physical exam:  GEN: This is a well  developed, well-nourished female in no acute distress, in a pleasant mood.    SKIN:  Focused examination of the face, eyebrows, eyelashes, scalp, neck, hands, and nails was performed.  - Harris Skin Type I-II  - Scattered on the scalp, particularly the bilateral parietal scalp where we recently performed ILK, there are many small divets within which there are several 1-2 mm vellus hairs.  - Eyebrows, eyelashes absent.  - Diffuse scattered randomly distributed 1-2 mm pits on most nails     Impression/Plan:  1. Alopecia universalis, persistent, steroid-responsive. Discussed squaric acid vs tofacitinib as well as stool transplantation studies today. There are no fecal transplant studies available at this time. Patient is strongly considering tofacitinib.   Kenalog intralesional injection procedure note: After verbal consent and discussion of risks including but not limited to atrophy, pain, and bruising, time out was performed, the patient underwent positioning and the area was prepped with isopropyl alcohol, 3.0 total cc of Kenalog 10 mg/cc was injected into 30 sites on the bilateral parietal scalp.  The patient tolerated the procedure well and left the Dermatology clinic in good condition.    Pending CBC, CMP, Lipids, Hep B/Hep C, Quant Gold, UA for baseline for tofacitinib  Will order tofacitinib 5mg twice daily to start the PA. Long discussion about the possible risk of infection, though in general we have not been seeing infections in other patients using this for alopecia areata.   Continue clobetasol cream under occlusion     Awaiting kit for GI microbiome/fecal transplantation study.     2. Iron deficiency without anemia. Ferritin was 4 in 5/2017, now improved to 24 on iron supplementation.   - Continue iron supplementation    CC Alta Sotelo R  Follow-up in 6 weeks, earlier for new or changing lesions.     Flores Avalos MD  PGY-5 Internal Medicine/ Dermatology  [phone]    Dr. Boswell  Grace staffed the patient.    Staff Involved:  Resident(Flores Avalos)/Staff(as above)    Patient was seen and examined with the medicine/dermatology resident. I agree with the history, review of systems, physical examination, assessments and plan. I was present for the key portion of the ILK injections.    Andreia Edwards MD  Professor and  Chair  Department of Dermatology  [facility]

## 2019-01-24 NOTE — LETTER
1/24/2019       RE: Sarah A Riedell  [address]  [place] [address]     Dear Colleague,    Thank you for referring your patient, Sarah A Riedell, to the [facility] DERMATOLOGY at [facility]. Please see a copy of my visit note below.    Drug Administration Record    Prior to injection, verified patient identity using patient's name and date of birth.  Due to injection administration, patient instructed to remain in clinic for 15 minutes  afterwards, and to report any adverse reaction to me immediately.    Drug Name: triamcinolone acetonide(kenalog)  Dose: 3mL of triamcinolone 10mg/mL, 30mg dose  Route administered: ID  NDC #: [id number]: Kenalog-10 (6315-0489-68)  Amount of waste(mL):2  Reason for waste: Multi dose vial    LOT #: [id number]  SITE: scalp  : [employer]  EXPIRATION DATE: 6/2020    [facility] Dermatology Note    Dermatology Problem List:  1. Alopecia areata, previously focal patchy disease that responded well to ILK but around April 2017 developed diffuse shedding in context of recent IUD placement (~Nov 2016). IUD now removed.     - Current rx: Pending tofacitinib PA and baseline labs (1/24/19)          ILK; Continue clobetasol cream under occlusion nightly 3x weekly starting 12/13/2018; pending participation in GI fecal transplant/microbiome study  - Prior rx: prednisone course May-August 2017 with cessation of acute flare, ketoconazole shampoo, minoxidil, and tacrolimus for eyebrows, clobetasol shampoo, fexofenadine, simvastatin/ezetimbe briefly, topical GAYATHRI inhibitor in trial at [facility] for 2-3 months with no response.  - Labs 5/2017 with significant iron deficiency (ferritin 4), started on iron supplementation, repeat iron studies 10/2018 with ferritin 24.    CC:   Chief Complaint   Patient presents with     Hair Loss     Alopecia Areata - Lucrecia notes re-growth in some places, and more loss in others.     Encounter  Date: 2019    History of Present Illness:  Sarah A Riedell is a 28 year old woman who presents as a follow-up for alopecia areata. She was was last seen 18 at which time she underwent ILK to the entire scalp. She recently stopped home nbUVB. She has been only doing clobetasol cream under occlusion. She has not been using Rogaine for a while.       Overall she feels that her hair has been growing back more after the recent diffuse treatment with ILK and with the clobetasol cream. Her body hair is stable and she has not had regrowth of her eyebrows, eyelashes, or body hair. She is still wondering about other treatments including tofacitinib and fecal transplantation.     Past Medical History:   Past Medical History:   Diagnosis Date     Fibroadenoma of breast      Past Surgical History:  Past Surgical History:   Procedure Laterality Date     BREAST SURGERY      lump removed from left breast     Social History:  The patient works in [employer].     Family History:  Family History   Problem Relation Age of Onset     Asthma Mother      Hypertension Father      Cancer Paternal Grandmother         leukemia     Thyroid Disease Sister      Breast Cancer No family hx of      Melanoma No family hx of      Skin Cancer No family hx of      Medications:  Current Outpatient Medications   Medication Sig Dispense Refill     clobetasol (TEMOVATE) 0.05 % external cream Apply to scalp three times nightly, cover with saran wrap/shower cap, then rinse off in morning. 120 g 1     NONFORMULARY Please dispense one wig prosthesis. 1 each 3     Prenatal Multivit-Min-Fe-FA (PRE-ZAID PO) Take by mouth daily       tofacitinib (XELJANZ) 5 MG tablet Take 1 tablet (5 mg) by mouth 2 times daily 180 tablet 3     clobetasol propionate 0.05 % SHAM Apply sparingly to dry scalp once daily as needed.  Leave in place for 15 minutes then add water, lather and rinse thoroughly. (Patient not taking: Reported on 2019) 1 Bottle 3      ezetimibe (ZETIA) 10 MG tablet Take 1 tablet (10 mg) by mouth daily (Patient not taking: Reported on 1/24/2019) 90 tablet 1     fexofenadine (ALLEGRA) 180 MG tablet Take 1 tablet (180 mg) by mouth daily (Patient not taking: Reported on 1/24/2019) 90 tablet 1     KARIVA 0.15-0.02/0.01 MG (21/5) per tablet TAKE 1 TABLET BY MOUTH EVERY DAY (Patient not taking: Reported on 1/24/2019) 28 tablet 0     simvastatin (ZOCOR) 20 MG tablet Take 2 tablets (40 mg) by mouth At Bedtime (Patient not taking: Reported on 1/24/2019) 180 tablet 1     Allergies:  Allergies   Allergen Reactions     No Clinical Screening - See Comments Unknown     duracef     Review of Systems:  Skin: The patient denies any new rash, pruritus, or lesions that are symptomatic, changing or bleeding, except as per HPI.  Constitutional: Otherwise in usual state of health.    Physical exam:  GEN: This is a well developed, well-nourished female in no acute distress, in a pleasant mood.    SKIN:  Focused examination of the face, eyebrows, eyelashes, scalp, neck, hands, and nails was performed.  - Harris Skin Type I-II  - Scattered on the scalp, particularly the bilateral parietal scalp where we recently performed ILK, there are many small divets within which there are several 1-2 mm vellus hairs.  - Eyebrows, eyelashes absent.  - Diffuse scattered randomly distributed 1-2 mm pits on most nails     Impression/Plan:  1. Alopecia universalis, persistent, steroid-responsive. Discussed squaric acid vs tofacitinib as well as stool transplantation studies today. There are no fecal transplant studies available at this time. Patient is strongly considering tofacitinib.   Kenalog intralesional injection procedure note: After verbal consent and discussion of risks including but not limited to atrophy, pain, and bruising, time out was performed, the patient underwent positioning and the area was prepped with isopropyl alcohol, 3.0 total cc of Kenalog 10 mg/cc was  injected into ~30 sites on the bilateral parietal scalp.  The patient tolerated the procedure well and left the Dermatology clinic in good condition.    Pending CBC, CMP, Lipids, Hep B/Hep C, Quant Gold, UA for baseline for tofacitinib  Will order tofacitinib 5mg twice daily to start the PA. Long discussion about the possible risk of infection, though in general we have not been seeing infections in other patients using this for alopecia areata.   Continue clobetasol cream under occlusion     Awaiting kit for GI microbiome/fecal transplantation study. I will reach out to Dr. Edwards today to see if there are any updates with this.    2. Iron deficiency without anemia. Ferritin was 4 in 5/2017, now improved to 24 on iron supplementation.   - Continue iron supplementation    CC Alta Sotelo  Follow-up in 6 weeks, earlier for new or changing lesions.     Flores Avalos MD  PGY-5 Internal Medicine/ Dermatology  [phone]    Dr. Andreia Edwards staffed the patient.    Staff Involved:  Resident(Flores Avalos)/Staff(as above)

## 2019-01-24 NOTE — TELEPHONE ENCOUNTER
PA Initiation    Medication: Xeljanz 5mg tablets  Insurance Company: [employer] - Phone [phone] Fax [phone]  Pharmacy Filling the Rx: [facility] [address]  Filling Pharmacy Phone: [phone]  Filling Pharmacy Fax:    Start Date: 1/24/2019    ** Past attempt in 2017 to gain coverage for Xeljanz for alopecia (insurance denied and pt declined submitting info for free drug); will re-attempt as she has new ins

## 2019-01-24 NOTE — NURSING NOTE
Dermatology Rooming Note    Sarah A Riedell's goals for this visit include:   Chief Complaint   Patient presents with     Hair Loss     Alopecia Areata - Lucrecia notes re-growth in some places, and more loss in others.     Sandy Dutton, CMA

## 2019-01-25 LAB
GAMMA INTERFERON BACKGROUND BLD IA-ACNC: 0.02 IU/ML
M TB IFN-G BLD-IMP: NEGATIVE
M TB IFN-G CD4+ BCKGRND COR BLD-ACNC: 7.82 IU/ML
MITOGEN IGNF BCKGRD COR BLD-ACNC: 0.07 IU/ML
MITOGEN IGNF BCKGRD COR BLD-ACNC: 0.11 IU/ML

## 2019-01-25 NOTE — TELEPHONE ENCOUNTER
Medication Appeal Initiation    We have initiated an appeal for the requested medication:  Medication: Xeljanz 5mg tablets - DENIED, APPEALING  Appeal Start Date:  1/18/2019  Insurance Company: [employer] - Phone [phone] Fax [phone]  Comments:   JESSIN along with chart notes and studies faxed to [facility] specialty appeals via fax# [phone]

## 2019-01-25 NOTE — TELEPHONE ENCOUNTER
PRIOR AUTHORIZATION DENIED    Medication: Xeljanz 5mg tablets - DENIED    Denial Date: 1/25/2019    Denial Rationale: Alopecia is an off-label diagnosis for Xeljanz use    Appeal Information: Yes, on letter - via fax, to [facility] specialty appeals

## 2019-01-28 NOTE — TELEPHONE ENCOUNTER
Prior Authorization Approval    Authorization Effective Date: 12/27/2018  Authorization Expiration Date: 1/27/2021  Medication: Xeljanz 5mg tablets - APPROVED  Approved Dose/Quantity: 60 per 30 days  Reference #: [facility] key# [id number]   Insurance Company: [employer] - Phone [phone] Fax [phone]  Expected CoPay:       CoPay Card Available: Yes    Foundation Assistance Needed:    Which Pharmacy is filling the prescription (Not needed for infusion/clinic administered): [facility] [address]  Pharmacy Notified: Yes  Patient Notified: Yes

## 2019-03-07 ENCOUNTER — TELEPHONE (OUTPATIENT)
Dept: DERMATOLOGY | Facility: CLINIC | Age: 29
End: 2019-03-07

## 2019-03-07 ENCOUNTER — TELEPHONE (OUTPATIENT)
Dept: GASTROENTEROLOGY | Facility: CLINIC | Age: 29
End: 2019-03-07

## 2019-03-07 ENCOUNTER — OFFICE VISIT (OUTPATIENT)
Dept: DERMATOLOGY | Facility: CLINIC | Age: 29
End: 2019-03-07
Payer: COMMERCIAL

## 2019-03-07 ENCOUNTER — APPOINTMENT (OUTPATIENT)
Dept: LAB | Facility: CLINIC | Age: 29
End: 2019-03-07
Payer: COMMERCIAL

## 2019-03-07 DIAGNOSIS — F34.1 DYSTHYMIA: ICD-10-CM

## 2019-03-07 DIAGNOSIS — L63.9 ALOPECIA AREATA: Primary | ICD-10-CM

## 2019-03-07 LAB — TSH SERPL DL<=0.005 MIU/L-ACNC: 1.65 MU/L (ref 0.4–4)

## 2019-03-07 ASSESSMENT — PAIN SCALES - GENERAL: PAINLEVEL: NO PAIN (0)

## 2019-03-07 NOTE — LETTER
RE: Sarah A Riedell  [address]  [place] [address]     Dear Colleague,    Thank you for referring your patient, Sarah A Riedell, to the [facility] DERMATOLOGY at [facility]. Please see a copy of my visit note below.    Drug Administration Record    Prior to injection, verified patient identity using patient's name and date of birth.  Due to injection administration, patient instructed to remain in clinic for 15 minutes  afterwards, and to report any adverse reaction to me immediately.    Drug Name: triamcinolone acetonide(kenalog)  Dose: 4mL of triamcinolone 2.5mg/mL, 10mg dose  Route administered: ID  NDC #: [id number]: Kenalog-10 ([id number])  Amount of waste(mL):  Reason for waste: Single use vial    LOT #:[id number]  SITE: Scalp  : [employer]  EXPIRATION DATE: 09/2020    [facility] Dermatology Note    Dermatology Problem List:  1. Alopecia areata, previously focal patchy disease that responded well to ILK but around April 2017 developed diffuse shedding in context of IUD placement (~Nov 2016). IUD now removed.     - Current rx: ILK; Continue clobetasol cream under occlusion nightly 3x weekly starting 12/13/2018;            - Pending participation in GI fecal transplant/microbiome (POOP study); Referral to Dr. Bettencourt 3/7/19.    - Prior rx: prednisone course May-August 2017 with cessation of acute flare, ketoconazole shampoo, minoxidil, and tacrolimus for eyebrows, clobetasol shampoo, fexofenadine, simvastatin/ezetimbe briefly, topical GAYATHRI inhibitor in trial at [place] for 2-3 months with no response.  - Tofacitinib approved 2/2019, but patient decided against starting treatment.   - Labs 5/2017 with significant iron deficiency (ferritin 4), started on iron supplementation, repeat iron studies 10/2018 with ferritin 24. Repeat TSH 3/7/19 wnl.     CC:   Chief Complaint   Patient presents with     Hair Loss     Alopecia  Areata - no change since her last visit.     Encounter Date: 03/09/2019    History of Present Illness:  Sarah A Riedell is a 28 year old woman who presents as a follow-up for alopecia areata.         She was last seen 1/24/19 when she again underwent intralesional kenalog injections to the entire scalp. She states that overall her hair is about the same, with scattered areas of regrowth throughout the scalp, but no significant increase in the density. At the last visit there was a long discussion about tofacitinib, and the patient ultimately decided that she was not interested in the treatment because of the long term risks of side effects. She has continued doing the clobetasol cream under occlusion intermittently. She has still not had regrowth of her eyebrows, eyelashes, or body hair. She is still most interested in fecal transplantation.          She states that she is concerned about any underlying illness. She feels that her energy levels have been low over the last several months, she often sleeps when she gets home. She used to exercise, but now finds it difficulty to get to the gym. She has also been feeling down, and during our interview she becomes tearful when talking about the impact that her alopecia totalis/universalis has had on her life. She denies any weight change. She does not alternating constipation and diarrhea, but no consistent problems with either change in her bowel movements.      Past Medical History:   Past Medical History:   Diagnosis Date     Fibroadenoma of breast      Past Surgical History:  Past Surgical History:   Procedure Laterality Date     BREAST SURGERY      lump removed from left breast     Social History:  The patient works in [employer].     Family History:  Family History   Problem Relation Age of Onset     Asthma Mother      Hypertension Father      Cancer Paternal Grandmother         leukemia     Thyroid Disease Sister      Breast Cancer No family hx of      Melanoma  No family hx of      Skin Cancer No family hx of      Medications:  Current Outpatient Medications   Medication Sig Dispense Refill     clobetasol (TEMOVATE) 0.05 % external cream Apply to scalp three times nightly, cover with saran wrap/shower cap, then rinse off in morning. 120 g 1     NONFORMULARY Please dispense one wig prosthesis. 1 each 3     Prenatal Multivit-Min-Fe-FA (PRE- PO) Take by mouth daily       tofacitinib (XELJANZ) 5 MG tablet Take 1 tablet (5 mg) by mouth 2 times daily 180 tablet 3     clobetasol propionate 0.05 % SHAM Apply sparingly to dry scalp once daily as needed.  Leave in place for 15 minutes then add water, lather and rinse thoroughly. (Patient not taking: Reported on 2019) 1 Bottle 3     ezetimibe (ZETIA) 10 MG tablet Take 1 tablet (10 mg) by mouth daily (Patient not taking: Reported on 2019) 90 tablet 1     fexofenadine (ALLEGRA) 180 MG tablet Take 1 tablet (180 mg) by mouth daily (Patient not taking: Reported on 2019) 90 tablet 1     KARIVA 0.15-0.02/0.01 MG (/) per tablet TAKE 1 TABLET BY MOUTH EVERY DAY (Patient not taking: Reported on 2019) 28 tablet 0     simvastatin (ZOCOR) 20 MG tablet Take 2 tablets (40 mg) by mouth At Bedtime (Patient not taking: Reported on 2019) 180 tablet 1     Allergies:  Allergies   Allergen Reactions     No Clinical Screening - See Comments Unknown     duracef     Physical exam:  GEN: This is a well developed, well-nourished female in no acute distress, in a pleasant mood.    SKIN:  Focused examination of the face, eyebrows, eyelashes, scalp, neck, hands, and nails was performed.  - Harris Skin Type I-II  - Scattered on the scalp, particularly the bilateral parietal scalp where we recently performed ILK, there are many small divets within which there are several 3-4 mm vellus and terminal hairs.  - Eyebrows, eyelashes absent.  - Diffuse scattered randomly distributed 1-2 mm pits on most nails     Impression/Plan:  1.  Alopecia universalis, persistent, steroid-responsive. We again discussed squaric acid vs tofacitinib as well as stool transplantation studies today. The patient is primarily interested in stool transplantation and other methods to modify her microbiome. Unfortunately, there are no fecal transplant studies available at this time.   Kenalog intralesional injection procedure note: After verbal consent and discussion of risks including but not limited to atrophy, pain, and bruising, time out was performed, the patient underwent positioning and the area was prepped with isopropyl alcohol, 4.0 total cc of Kenalog 2.5 mg/cc was injected into 40 sites on the bilateral parietal scalp.  The patient tolerated the procedure well and left the Dermatology clinic in good condition.    Of note patient previously had elevated sex hormone binding globulin and low testosterone, which can be associated with medications (includin opiates). The patient takes CBD oil for sleep, but has no other risk factors for elevated SHBG. Would plan to repeat her Testosterone Free and Total at follow-up.     Continue clobetasol cream under occlusion     Referral to GI to meet with Dr. Rosales and discuss the GI microbiome/fecal transplantation (POOP) study.     2. Iron deficiency without anemia. Ferritin was 4 in 5/2017, now improved to 24 on iron supplementation.   - Continue iron supplementation    3. Fatigue, Alternating Constipation and Diarrhea: She has no other red flag symptoms for an underlying disease related to her alopecia areata. I am most concerned about depression/adjustement disorder related to her alopecia totalis.   - TSH wnl on 3/7/19.   - Discussed with patient that many of these changes may be associated with difficulty adjusting to the complete loss of her hair and/or depression. We discussed that it might be helpful for her to see a psychologist.   - Referral to psychology     CC Alta Sotelo  Follow-up in 6 weeks,  earlier for new or changing lesions.     Flores Avalos MD  PGY-5 Internal Medicine/ Dermatology  [phone]    Dr. Andreia Edwards staffed the patient.    Staff Involved:  Resident(Flores Avalos)/Staff(as above)

## 2019-03-07 NOTE — TELEPHONE ENCOUNTER
[facility] Call Center    Phone Message    May a detailed message be left on voicemail: no    Reason for Call: Pt asking for appt for fecal transplant with Dr Weinberg.  All records are within [facility].   [phone].  Referral in chart.

## 2019-03-07 NOTE — TELEPHONE ENCOUNTER
Chart reviewed and noted that reason for referral is due to alopecia.  Called patient, left message advising that Dr. Weinberg only sees/treats patients for FMT due to C Diff.  Gave my direct number to call back if she has any questions.      Carol Westholter

## 2019-03-07 NOTE — TELEPHONE ENCOUNTER
[facility] Call Center    Phone Message    May a detailed message be left on voicemail: yes    Reason for Call: Patient said she called GI to schedule the referral with Dr. Weinberg, but when she spoke to the nurse they said they didn't know about any collaborations between the two departments, she is requesting we call to help her schedule or call the Provider to provider [phone] line to discuss it with Dr Weinberg.    Action Taken: Message routed to:  [facility] ([facility]

## 2019-03-07 NOTE — NURSING NOTE
Dermatology Rooming Note    Sarah A Riedell's goals for this visit include:   Chief Complaint   Patient presents with     Hair Loss     Alopecia Areata - no change since her last visit.     Sandy Dutton, CMA

## 2019-03-07 NOTE — PROGRESS NOTES
Drug Administration Record    Prior to injection, verified patient identity using patient's name and date of birth.  Due to injection administration, patient instructed to remain in clinic for 15 minutes  afterwards, and to report any adverse reaction to me immediately.    Drug Name: triamcinolone acetonide(kenalog)  Dose: 4mL of triamcinolone 2.5mg/mL, 10mg dose  Route administered: ID  NDC #: [id number]: Kenalog-10 ([id number])  Amount of waste(mL):  Reason for waste: Single use vial    LOT #:[id number]  SITE: Scalp  : Deliv  EXPIRATION DATE: 09/2020

## 2019-03-08 NOTE — TELEPHONE ENCOUNTER
JENNA Singer letting her know we received her call and Dr. Avalos is in the office on Thursday's. I will try to reach Dr. Avalos and ask her to do to a physician to physician referral.    RICHIE Barber

## 2019-03-09 NOTE — PROGRESS NOTES
[facility] Dermatology Note    Dermatology Problem List:  1. Alopecia areata, previously focal patchy disease that responded well to ILK but around April 2017 developed diffuse shedding in context of IUD placement (~Nov 2016). IUD now removed.     - Current rx: ILK; Continue clobetasol cream under occlusion nightly 3x weekly starting 12/13/2018;            - Pending participation in GI fecal transplant/microbiome (POOP study); Referral to Dr. Bettencourt 3/7/19.    - Prior rx: prednisone course May-August 2017 with cessation of acute flare, ketoconazole shampoo, minoxidil, and tacrolimus for eyebrows, clobetasol shampoo, fexofenadine, simvastatin/ezetimbe briefly, topical GAYATHRI inhibitor in trial at [place] for 2-3 months with no response.  - Tofacitinib approved 2/2019, but patient decided against starting treatment.   - Labs 5/2017 with significant iron deficiency (ferritin 4), started on iron supplementation, repeat iron studies 10/2018 with ferritin 24. Repeat TSH 3/7/19 wnl.     CC:   Chief Complaint   Patient presents with     Hair Loss     Alopecia Areata - no change since her last visit.     Encounter Date: 03/09/2019    History of Present Illness:  Sarah A Riedell is a 28 year old woman who presents as a follow-up for alopecia areata.         She was last seen 1/24/19 when she again underwent intralesional kenalog injections to the entire scalp. She states that overall her hair is about the same, with scattered areas of regrowth throughout the scalp, but no significant increase in the density. At the last visit there was a long discussion about tofacitinib, and the patient ultimately decided that she was not interested in the treatment because of the long term risks of side effects. She has continued doing the clobetasol cream under occlusion intermittently. She has still not had regrowth of her eyebrows, eyelashes, or body hair. She is still most interested in fecal transplantation.           She states that she is concerned about any underlying illness. She feels that her energy levels have been low over the last several months, she often sleeps when she gets home. She used to exercise, but now finds it difficulty to get to the gym. She has also been feeling down, and during our interview she becomes tearful when talking about the impact that her alopecia totalis/universalis has had on her life. She denies any weight change. She does not alternating constipation and diarrhea, but no consistent problems with either change in her bowel movements.      Past Medical History:   Past Medical History:   Diagnosis Date     Fibroadenoma of breast      Past Surgical History:  Past Surgical History:   Procedure Laterality Date     BREAST SURGERY      lump removed from left breast     Social History:  The patient works in [employer].     Family History:  Family History   Problem Relation Age of Onset     Asthma Mother      Hypertension Father      Cancer Paternal Grandmother         leukemia     Thyroid Disease Sister      Breast Cancer No family hx of      Melanoma No family hx of      Skin Cancer No family hx of      Medications:  Current Outpatient Medications   Medication Sig Dispense Refill     clobetasol (TEMOVATE) 0.05 % external cream Apply to scalp three times nightly, cover with saran wrap/shower cap, then rinse off in morning. 120 g 1     NONFORMULARY Please dispense one wig prosthesis. 1 each 3     Prenatal Multivit-Min-Fe-FA (PRE-ZAID PO) Take by mouth daily       tofacitinib (XELJANZ) 5 MG tablet Take 1 tablet (5 mg) by mouth 2 times daily 180 tablet 3     clobetasol propionate 0.05 % SHAM Apply sparingly to dry scalp once daily as needed.  Leave in place for 15 minutes then add water, lather and rinse thoroughly. (Patient not taking: Reported on 2019) 1 Bottle 3     ezetimibe (ZETIA) 10 MG tablet Take 1 tablet (10 mg) by mouth daily (Patient not taking: Reported on 2019) 90 tablet 1      fexofenadine (ALLEGRA) 180 MG tablet Take 1 tablet (180 mg) by mouth daily (Patient not taking: Reported on 1/24/2019) 90 tablet 1     KARIVA 0.15-0.02/0.01 MG (21/5) per tablet TAKE 1 TABLET BY MOUTH EVERY DAY (Patient not taking: Reported on 1/24/2019) 28 tablet 0     simvastatin (ZOCOR) 20 MG tablet Take 2 tablets (40 mg) by mouth At Bedtime (Patient not taking: Reported on 1/24/2019) 180 tablet 1     Allergies:  Allergies   Allergen Reactions     No Clinical Screening - See Comments Unknown     duracef     Review of Systems:  Skin: The patient denies any new rash, pruritus, or lesions that are symptomatic, changing or bleeding, except as per HPI.  Constitutional: Otherwise in usual state of health.    Physical exam:  GEN: This is a well developed, well-nourished female in no acute distress, in a pleasant mood.    SKIN:  Focused examination of the face, eyebrows, eyelashes, scalp, neck, hands, and nails was performed.  - Harris Skin Type I-II  - Scattered on the scalp, particularly the bilateral parietal scalp where we recently performed ILK, there are many small divets within which there are several 3-4 mm vellus and terminal hairs.  - Eyebrows, eyelashes absent.  - Diffuse scattered randomly distributed 1-2 mm pits on most nails     Impression/Plan:  1. Alopecia universalis, persistent, steroid-responsive. We again discussed squaric acid vs tofacitinib as well as stool transplantation studies today. The patient is primarily interested in stool transplantation and other methods to modify her microbiome. Unfortunately, there are no fecal transplant studies availablen for alopecia areata at this time.   Kenalog intralesional injection procedure note: After verbal consent and discussion of risks including but not limited to atrophy, pain, and bruising, time out was performed, the patient underwent positioning and the area was prepped with isopropyl alcohol, 4.0 total cc of Kenalog 2.5 mg/cc was injected into  40 sites on the bilateral parietal scalp.  The patient tolerated the procedure well and left the Dermatology clinic in good condition.    Of note patient previously had elevated sex hormone binding globulin and low testosterone, which can be associated with medications (includin opiates). The patient takes CBD oil for sleep, but has no other risk factors for elevated SHBG. Would plan to repeat her Testosterone Free and Total and SHBG at follow-up.     Continue clobetasol cream under occlusion     Referral to GI to meet with Dr. Rosales and discuss the GI microbiome/fecal transplantation (POOP) study.     2. Iron deficiency without anemia. Ferritin was 4 in 5/2017, now improved to 24 on iron supplementation.   - Continue iron supplementation    3. Fatigue, Alternating Constipation and Diarrhea: She has no other red flag symptoms for an underlying disease related to her alopecia areata. We are most concerned about depression/adjustement disorder related to her alopecia totalis.   - TSH wnl on 3/7/19.   - Discussed with patient that many of these changes may be associated with difficulty adjusting to the complete loss of her hair and/or depression. We discussed that it might be helpful for her to see a psychologist.   - Referral to psychology     CC Alta Sotelo  Follow-up in 6 weeks, earlier for new or changing lesions.     Flores Avalos MD  PGY-5 Internal Medicine/ Dermatology  [phone]    Dr. Andreia Edwards staffed the patient.    Staff Involved:  Resident(Flores Avalos)/Staff(as above)    Patient was seen and examined with the medicine/dermatology resident. I agree with the history, review of systems, physical examination, assessments and plan. I was present for the key portion of the ILK procedure.    Andreia Edwards MD  Professor and  Chair  Department of Dermatology  [facility]

## 2019-03-21 ENCOUNTER — TELEPHONE (OUTPATIENT)
Dept: PSYCHOLOGY | Facility: CLINIC | Age: 29
End: 2019-03-21

## 2019-03-21 NOTE — TELEPHONE ENCOUNTER
Received message from Lucrecia on 3/13 to schedule with Health Psychology on referral from Dr. Avalos in derm. Was out of office from 3/13-3/20 and returned message today - offered several times for new pt appointments and she will be scheduled pending her availability.   Hina Espinoza, PhD,   Clinical Health Psychologist

## 2019-06-06 ENCOUNTER — OFFICE VISIT (OUTPATIENT)
Dept: DERMATOLOGY | Facility: CLINIC | Age: 29
End: 2019-06-06
Payer: COMMERCIAL

## 2019-06-06 DIAGNOSIS — R89.9 ABNORMAL LABORATORY TEST RESULT: ICD-10-CM

## 2019-06-06 DIAGNOSIS — L63.9 ALOPECIA AREATA: Primary | ICD-10-CM

## 2019-06-06 DIAGNOSIS — L63.9 ALOPECIA AREATA: ICD-10-CM

## 2019-06-06 LAB — FERRITIN SERPL-MCNC: 25 NG/ML (ref 12–150)

## 2019-06-06 RX ORDER — CLOBETASOL PROPIONATE 0.5 MG/G
CREAM TOPICAL
Qty: 120 G | Refills: 3 | Status: SHIPPED | OUTPATIENT
Start: 2019-06-06

## 2019-06-06 ASSESSMENT — PAIN SCALES - GENERAL: PAINLEVEL: NO PAIN (0)

## 2019-06-06 NOTE — NURSING NOTE
Dermatology Rooming Note    Sarah A Riedell's goals for this visit include:   Chief Complaint   Patient presents with     Hair Loss     Alopecia areata - Lucrecia notes more loss since her last visit.     Sandy Dutton, CMA

## 2019-06-06 NOTE — PATIENT INSTRUCTIONS
In summer, we recommend an anti-dandruff shampoo like Head and Shoulders.    We will repeat labs today.    You can resume the clobetasol cream.    Please connect with Dr. Rosales.    Return in 3-4 months, sooner if concerns.

## 2019-06-06 NOTE — LETTER
6/6/2019       RE: Sarah A Riedell  [address] [place] [address]     Dear Colleague,    Thank you for referring your patient, Sarah A Riedell, to the [facility] DERMATOLOGY at [facility]. Please see a copy of my visit note below.    [facility] Dermatology Note    Dermatology Problem List:  1. Alopecia areata, previously focal patchy disease that responded well to ILK but around April 2017 developed diffuse shedding in context of IUD placement (~Nov 2016). IUD now removed.     - Current rx: intermittent ILK; clobetasol cream under occlusion nightly 3x weekly, anti-dandruff shampoo     - Pending participation in GI fecal transplant/microbiome (POOP study); working with Dr. Weinberg.    - Prior rx: prednisone course May-August 2017 with cessation of acute flare, ketoconazole shampoo, minoxidil, and tacrolimus for eyebrows, clobetasol shampoo, fexofenadine, simvastatin/ezetimbe briefly, topical GAYATHRI inhibitor in trial at [place] for 2-3 months with no response.  - Tofacitinib approved 2/2019, but patient decided against starting treatment.   - Labs 5/2017 with significant iron deficiency (ferritin 4), started on iron supplementation, repeat iron studies 6/2019 with ferritin 25. Repeat TSH 3/7/19 wnl.   - SHBG elevated, free testosterone low 12/2018; recheck 6/6/2019 pending    CC:   Chief Complaint   Patient presents with     Hair Loss     Alopecia areata - Lucrecia notes more loss since her last visit.     Encounter Date: 06/06/2019    History of Present Illness:  Sarah A Riedell is a 28 year old woman who presents as a follow-up for alopecia areata. Last evaluated 3/2019 at which time she underwent 4.0 ml of Kenalog 2.5 mg/mL. She was also continued on clobetasol cream and remains interested in the poop microbiome study with GI. Since her last visit, unfortunately has not really noted any improvement. She did not have much response with the ILK. No  longer using clobetasol as she ran out but interested in resuming. She is otherwise in her usual state of health, no additional skin concerns. Planning to continue seeing psychology.    Past Medical History:   Past Medical History:   Diagnosis Date     Fibroadenoma of breast      Past Surgical History:  Past Surgical History:   Procedure Laterality Date     BREAST SURGERY      lump removed from left breast     Social History:  The patient works in [employer].     Family History:  Family History   Problem Relation Age of Onset     Asthma Mother      Hypertension Father      Cancer Paternal Grandmother         leukemia     Thyroid Disease Sister      Breast Cancer No family hx of      Melanoma No family hx of      Skin Cancer No family hx of      Medications:  Current Outpatient Medications   Medication Sig Dispense Refill     clobetasol (TEMOVATE) 0.05 % external cream Apply to scalp three times nightly, cover with saran wrap/shower cap, then rinse off in morning. 120 g 3     clobetasol propionate 0.05 % SHAM Apply sparingly to dry scalp once daily as needed.  Leave in place for 15 minutes then add water, lather and rinse thoroughly. (Patient not taking: Reported on 2019) 1 Bottle 3     ezetimibe (ZETIA) 10 MG tablet Take 1 tablet (10 mg) by mouth daily (Patient not taking: Reported on 2019) 90 tablet 1     fexofenadine (ALLEGRA) 180 MG tablet Take 1 tablet (180 mg) by mouth daily (Patient not taking: Reported on 2019) 90 tablet 1     KARIVA 0.15-0.02/0.01 MG () per tablet TAKE 1 TABLET BY MOUTH EVERY DAY (Patient not taking: Reported on 2019) 28 tablet 0     NONFORMULARY Please dispense one wig prosthesis. 1 each 3     Prenatal Multivit-Min-Fe-FA (PRE-ZAID PO) Take by mouth daily       simvastatin (ZOCOR) 20 MG tablet Take 2 tablets (40 mg) by mouth At Bedtime (Patient not taking: Reported on 2019) 180 tablet 1     tofacitinib (XELJANZ) 5 MG tablet Take 1 tablet (5 mg) by mouth 2 times  daily (Patient not taking: Reported on 6/6/2019) 180 tablet 3     Allergies:  Allergies   Allergen Reactions     No Clinical Screening - See Comments Unknown     duracef     Review of Systems:  - Skin: The patient denies any new rash, pruritus, or lesions that are symptomatic, changing or bleeding, except as per HPI.  - Constitutional: Otherwise in usual state of health.    Physical exam:  GEN: This is a well developed, well-nourished female in no acute distress, in a pleasant mood.    SKIN:  Focused examination of the face, eyebrows, eyelashes, scalp, and neck was performed.  - Harris Skin Type I-II  - On the scalp, there are scattered vellus hairs.   - Eyebrows, eyelashes absent.    Impression/Plan:  1. Alopecia universalis - unfortuantely persistent and recently not much improvement with ILK. Discussed treatment options today including continuing ILK versus other approaches such as squaric acid versus tofacitinib. At this time, she would like to resume clobetasol cream and continue pursuing the poop microbiome study for now. We will plan to meet again in 4 months, sooner if she would like to pursue some of the approaches we have previously discussed.   Resume clobetasol cream 3 times weekly under occlusion.  Repeat labs today - ferritin wnl and no longer on iron supplementation. Testosterone and SHBG pending (previously had elevated SHBG and low free testosterone of unclear significance but can be seen in women on oral contraceptives). She uses CBD oil for sleep, but has no other risk factors for elevated SHBG such as OCP use.       Patient to work directly with Dr. Rosales and discuss the GI microbiome/fecal transplantation (POOP) study.     Continue working with psychology.    Advised to start anti-dandruff shampoo such as Head and Shoulders.    2. Iron deficiency without anemia. Ferritin was 4 in 5/2017, improved to 24 on iron supplementation. Repeat today 25 and she is no longer taking iron  supplementation.    CC Alta Sotelo  Follow-up in 4 months, sooner if concerns.    Dr. Andreia Edwards staffed the patient.    Staff Involved:  Resident(Danae Fall)/Staff(as above)    Patient was seen and examined with the medicine/dermatology resident. I agree with the history, review of systems, physical examination, assessments and plan.    Andreia Edwards MD  Professor and  Chair  Department of Dermatology  [facility]    Again, thank you for allowing me to participate in the care of your patient.      Sincerely,    Danae Fall MD

## 2019-06-07 LAB
SHBG SERPL-SCNC: 128 NMOL/L (ref 30–135)
TESTOST FREE SERPL-MCNC: 0.16 NG/DL (ref 0.08–0.74)
TESTOST SERPL-MCNC: 29 NG/DL (ref 8–60)

## 2019-06-07 NOTE — PROGRESS NOTES
[facility] Dermatology Note    Dermatology Problem List:  1. Alopecia areata, previously focal patchy disease that responded well to ILK but around April 2017 developed diffuse shedding in context of IUD placement (~Nov 2016). IUD now removed.     - Current rx: intermittent ILK; clobetasol cream under occlusion nightly 3x weekly, anti-dandruff shampoo     - Pending participation in GI fecal transplant/microbiome (POOP study); working with Dr. Weinberg.    - Prior rx: prednisone course May-August 2017 with cessation of acute flare, ketoconazole shampoo, minoxidil, and tacrolimus for eyebrows, clobetasol shampoo, fexofenadine, simvastatin/ezetimbe briefly, topical GAYATHRI inhibitor in trial at [place] for 2-3 months with no response.  - Tofacitinib approved 2/2019, but patient decided against starting treatment.   - Labs 5/2017 with significant iron deficiency (ferritin 4), started on iron supplementation, repeat iron studies 6/2019 with ferritin 25. Repeat TSH 3/7/19 wnl.   - SHBG elevated, free testosterone low 12/2018; recheck 6/6/2019 pending    CC:   Chief Complaint   Patient presents with     Hair Loss     Alopecia areata - Lucrecia notes more loss since her last visit.     Encounter Date: 06/06/2019    History of Present Illness:  Sarah A Riedell is a 28 year old woman who presents as a follow-up for alopecia areata. Last evaluated 3/2019 at which time she underwent 4.0 ml of Kenalog 2.5 mg/mL. She was also continued on clobetasol cream and remains interested in the poop microbiome study with GI. Since her last visit, unfortunately has not really noted any improvement. She did not have much response with the ILK. No longer using clobetasol as she ran out but interested in resuming. She is otherwise in her usual state of health, no additional skin concerns. Planning to continue seeing psychology.    Past Medical History:   Past Medical History:   Diagnosis Date     Fibroadenoma of breast      Past  Surgical History:  Past Surgical History:   Procedure Laterality Date     BREAST SURGERY      lump removed from left breast     Social History:  The patient works in [employer].     Family History:  Family History   Problem Relation Age of Onset     Asthma Mother      Hypertension Father      Cancer Paternal Grandmother         leukemia     Thyroid Disease Sister      Breast Cancer No family hx of      Melanoma No family hx of      Skin Cancer No family hx of      Medications:  Current Outpatient Medications   Medication Sig Dispense Refill     clobetasol (TEMOVATE) 0.05 % external cream Apply to scalp three times nightly, cover with saran wrap/shower cap, then rinse off in morning. 120 g 3     clobetasol propionate 0.05 % SHAM Apply sparingly to dry scalp once daily as needed.  Leave in place for 15 minutes then add water, lather and rinse thoroughly. (Patient not taking: Reported on 2019) 1 Bottle 3     ezetimibe (ZETIA) 10 MG tablet Take 1 tablet (10 mg) by mouth daily (Patient not taking: Reported on 2019) 90 tablet 1     fexofenadine (ALLEGRA) 180 MG tablet Take 1 tablet (180 mg) by mouth daily (Patient not taking: Reported on 2019) 90 tablet 1     KARIVA 0.15-0.02/0.01 MG (/) per tablet TAKE 1 TABLET BY MOUTH EVERY DAY (Patient not taking: Reported on 2019) 28 tablet 0     NONFORMULARY Please dispense one wig prosthesis. 1 each 3     Prenatal Multivit-Min-Fe-FA (PRE- PO) Take by mouth daily       simvastatin (ZOCOR) 20 MG tablet Take 2 tablets (40 mg) by mouth At Bedtime (Patient not taking: Reported on 2019) 180 tablet 1     tofacitinib (XELJANZ) 5 MG tablet Take 1 tablet (5 mg) by mouth 2 times daily (Patient not taking: Reported on 2019) 180 tablet 3     Allergies:  Allergies   Allergen Reactions     No Clinical Screening - See Comments Unknown     duracef     Review of Systems:  - Skin: The patient denies any new rash, pruritus, or lesions that are symptomatic,  changing or bleeding, except as per HPI.  - Constitutional: Otherwise in usual state of health.    Physical exam:  GEN: This is a well developed, well-nourished female in no acute distress, in a pleasant mood.    SKIN:  Focused examination of the face, eyebrows, eyelashes, scalp, and neck was performed.  - Harris Skin Type I-II  - On the scalp, there are scattered vellus hairs.   - Eyebrows, eyelashes absent.    Impression/Plan:  1. Alopecia universalis - unfortuantely persistent and recently not much improvement with ILK. Discussed treatment options today including continuing ILK versus other approaches such as squaric acid versus tofacitinib. At this time, she would like to resume clobetasol cream and continue pursuing the poop microbiome study for now. We will plan to meet again in 4 months, sooner if she would like to pursue some of the approaches we have previously discussed.   Resume clobetasol cream 3 times weekly under occlusion.  Repeat labs today - ferritin wnl and no longer on iron supplementation. Testosterone and SHBG pending (previously had elevated SHBG and low free testosterone of unclear significance but can be seen in women on oral contraceptives). She uses CBD oil for sleep, but has no other risk factors for elevated SHBG such as OCP use.       Patient to work directly with Dr. Rosales and discuss the GI microbiome/fecal transplantation (POOP) study.     Continue working with psychology.    Advised to start anti-dandruff shampoo such as Head and Shoulders.    2. Iron deficiency without anemia. Ferritin was 4 in 5/2017, improved to 24 on iron supplementation. Repeat today 25 and she is no longer taking iron supplementation.    CC Alta Sotelo R  Follow-up in 4 months, sooner if concerns.    Dr. Andreia Edwards staffed the patient.    Staff Involved:  Resident(Danae Fall)/Staff(as above)    Patient was seen and examined with the medicine/dermatology resident. I agree with the  history, review of systems, physical examination, assessments and plan.    Andreia Edwards MD  Professor and  Chair  Department of Dermatology  [facility]

## 2019-08-16 ENCOUNTER — TELEPHONE (OUTPATIENT)
Dept: DERMATOLOGY | Facility: CLINIC | Age: 29
End: 2019-08-16

## 2019-08-16 NOTE — TELEPHONE ENCOUNTER
[facility] Call Center    Phone Message    May a detailed message be left on voicemail: yes    Reason for Call: Other: per pt- needs a hairloss f/u with . my guidelines state she will be coming back in sept, but no schedule up for her, please call pt to schedule with , thanks     Action Taken: Message routed to:  [facility] ([facility] derm

## 2019-10-02 ENCOUNTER — TELEPHONE (OUTPATIENT)
Dept: DERMATOLOGY | Facility: CLINIC | Age: 29
End: 2019-10-02

## 2019-10-02 NOTE — TELEPHONE ENCOUNTER
Left a voicemail for Lucrecia informing her that she will not be seeing Dr. Edwards with Dr. Fall at her scheduled visit on 10/8.    I explained that Dr. Fall is no longer a resident and will not have an attending provider.

## 2019-10-08 ENCOUNTER — OFFICE VISIT (OUTPATIENT)
Dept: DERMATOLOGY | Facility: CLINIC | Age: 29
End: 2019-10-08
Payer: COMMERCIAL

## 2019-10-08 DIAGNOSIS — L63.9 ALOPECIA AREATA: Primary | ICD-10-CM

## 2019-10-08 ASSESSMENT — PAIN SCALES - GENERAL: PAINLEVEL: NO PAIN (0)

## 2019-10-08 NOTE — NURSING NOTE
Dermatology Rooming Note    Sarah A Riedell's goals for this visit include:   Chief Complaint   Patient presents with     Hair Loss     Alopecia areata - Lucrecia is here today for a recheck. She states there has not been any changes     Perla Velasquez CMA

## 2019-10-08 NOTE — LETTER
10/8/2019     RE: Sarah A Riedell  [address] [place] [address]     Dear Colleague,    Thank you for referring your patient, Sarah A Riedell, to the [facility] DERMATOLOGY at [facility]. Please see a copy of my visit note below.    [facility] Dermatology Note    Dermatology Problem List:  1. Alopecia areata, previously focal patchy disease that responded well to ILK but around April 2017 developed diffuse shedding in context of IUD placement (~Nov 2016). IUD now removed.   - Current rx: intermittent ILK; clobetasol cream under occlusion nightly 3x weekly, anti-dandruff shampoo   - Pending participation in GI fecal transplant/microbiome (POOP study); working with Dr. Weinberg.    - Prior rx: prednisone course May-August 2017 with cessation of acute flare, ketoconazole shampoo, minoxidil, and tacrolimus for eyebrows, clobetasol shampoo, fexofenadine, simvastatin/ezetimbe briefly, topical GAYATHRI inhibitor in trial at [place] for 2-3 months with no response.  - Tofacitinib approved 2/2019, but patient decided against starting treatment.   - Labs 5/2017 with significant iron deficiency (ferritin 4), started on iron supplementation, repeat iron studies 6/2019 with ferritin 25. Repeat TSH 3/7/19 wnl.   - SHBG elevated, free testosterone low 12/2018; recheck 6/6/2019 pending    CC:   Chief Complaint   Patient presents with     Hair Loss     Alopecia areata - Lucrecia is here today for a recheck. She states there has not been any changes     Encounter Date: 10/09/2019    History of Present Illness:  Sarah A Riedell is a 29 year old woman who presents as a follow-up for alopecia areata. Last evaluated 6/2019 at which time she was resumed on clobetasol cream under occlusion and an anti-dandruff shampoo. Plan was also to keep working on enrollment in microbiome poop study.    Since her last visit, there has been no change. She continues clobetasol  intermittently but doesn't seem to make a difference. She heard back on microbiome poop study but it seems as this isn't quite ready for her participation at this time. She brings several articles including one from Science (Cash, 2018 09 Mar, translocation of gut pathobiont drives autotimmunity) for review. Interested in any clinical trials that are ongoing. Otherwise in usual state of health, no additional skin concerns.    Past Medical History:   Past Medical History:   Diagnosis Date     Fibroadenoma of breast      Past Surgical History:  Past Surgical History:   Procedure Laterality Date     BREAST SURGERY      lump removed from left breast     Social History:  The patient works in [employer].     Family History:  Family History   Problem Relation Age of Onset     Asthma Mother      Hypertension Father      Cancer Paternal Grandmother         leukemia     Thyroid Disease Sister      Breast Cancer No family hx of      Melanoma No family hx of      Skin Cancer No family hx of      Medications:  Current Outpatient Medications   Medication Sig Dispense Refill     clobetasol (TEMOVATE) 0.05 % external cream Apply to scalp three times nightly, cover with saran wrap/shower cap, then rinse off in morning. 120 g 3     NONFORMULARY Please dispense one wig prosthesis. 1 each 3     Prenatal Multivit-Min-Fe-FA (PRE-ZAID PO) Take by mouth daily       clobetasol propionate 0.05 % SHAM Apply sparingly to dry scalp once daily as needed.  Leave in place for 15 minutes then add water, lather and rinse thoroughly. (Patient not taking: Reported on 2019) 1 Bottle 3     ezetimibe (ZETIA) 10 MG tablet Take 1 tablet (10 mg) by mouth daily (Patient not taking: Reported on 2019) 90 tablet 1     fexofenadine (ALLEGRA) 180 MG tablet Take 1 tablet (180 mg) by mouth daily (Patient not taking: Reported on 2019) 90 tablet 1     KARIVA 0.15-0.02/0.01 MG () per tablet TAKE 1 TABLET BY MOUTH EVERY DAY (Patient not taking:  Reported on 1/24/2019) 28 tablet 0     simvastatin (ZOCOR) 20 MG tablet Take 2 tablets (40 mg) by mouth At Bedtime (Patient not taking: Reported on 1/24/2019) 180 tablet 1     tofacitinib (XELJANZ) 5 MG tablet Take 1 tablet (5 mg) by mouth 2 times daily (Patient not taking: Reported on 6/6/2019) 180 tablet 3     Allergies:  Allergies   Allergen Reactions     No Clinical Screening - See Comments Unknown     duracef     Review of Systems:  - Skin: The patient denies any new rash, pruritus, or lesions that are symptomatic, changing or bleeding, except as per HPI.  - Constitutional: Otherwise in usual state of health.    Physical exam:  GEN: This is a well developed, well-nourished female in no acute distress, in a pleasant mood.    SKIN:  Focused examination of the face, eyebrows, eyelashes performed. Photos of scalp reviewed.  - Harris Skin Type I-II  - On the scalp, there are scattered vellus hairs.   - Eyebrows, eyelashes absent.    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 Discussed options today at length including systemic therapies such as tofacitinib, methotrexate, and Cellcept and provided handouts for each. Counseled that tofacitinib is recently popular due to success in challenging patients who have failed other therapies. Reviewed articles she brings in but counseled that in the majority of these, they are preliminary basic science articles in human/mouse models with varying diseases such as lupus, diabetes, etc. which makes it hard to extrapolate to humans with AA. At this time, she will continue to consider systemic therapy but in meantime, would like to get in touch with our research team for possible research opportunities. It sounds as those the poop microbiome study will not work out at this time but will also double check with Dr. Edwards for  any updates. She will continue clobetasol cream intermittently as she is doing. We will plan to meet again in 3 months, sooner if she would like to pursue some of the approaches we have previously discussed.   Continue clobetasol cream up to 3 times weekly under occlusion.    Continue anti-dandruff shampoo such as Head and Shoulders.    Will discuss clinical trial opportunities with Dr. Edwards and get Lucrecia in contact with our research team.    Consider systemics - tofacitinib, methotrexate, Cellcept. Also could consider squaric acid.    Over 25 minutes was spent during this visit, including >50% of face-to-face time with the patient counseling and coordinating care including the discussion of diagnosis, natural history, treatment, and prognosis as documented above.    CC Alta Sotelo  Follow-up in 3 months, sooner if concerns.    Staff Involved:  Staff only    Danae Fall MD    Department of Dermatology  [facility]: Phone: [phone], Fax: [phone]

## 2019-10-09 NOTE — PROGRESS NOTES
[facility] Dermatology Note    Dermatology Problem List:  1. Alopecia areata, previously focal patchy disease that responded well to ILK but around April 2017 developed diffuse shedding in context of IUD placement (~Nov 2016). IUD now removed.   - Current rx: intermittent ILK; clobetasol cream under occlusion nightly 3x weekly, anti-dandruff shampoo   - Pending participation in GI fecal transplant/microbiome (POOP study); working with Dr. Weinberg.    - Prior rx: prednisone course May-August 2017 with cessation of acute flare, ketoconazole shampoo, minoxidil, and tacrolimus for eyebrows, clobetasol shampoo, fexofenadine, simvastatin/ezetimbe briefly, topical GAYATHRI inhibitor in trial at [place] for 2-3 months with no response.  - Tofacitinib approved 2/2019, but patient decided against starting treatment.   - Labs 5/2017 with significant iron deficiency (ferritin 4), started on iron supplementation, repeat iron studies 6/2019 with ferritin 25. Repeat TSH 3/7/19 wnl.   - SHBG elevated, free testosterone low 12/2018; recheck 6/6/2019 pending    CC:   Chief Complaint   Patient presents with     Hair Loss     Alopecia areata - Lucrecia is here today for a recheck. She states there has not been any changes     Encounter Date: 10/09/2019    History of Present Illness:  Sarah A Riedell is a 29 year old woman who presents as a follow-up for alopecia areata. Last evaluated 6/2019 at which time she was resumed on clobetasol cream under occlusion and an anti-dandruff shampoo. Plan was also to keep working on enrollment in microbiome poop study.    Since her last visit, there has been no change. She continues clobetasol intermittently but doesn't seem to make a difference. She heard back on microbiome poop study but it seems as this isn't quite ready for her participation at this time. She brings several articles including one from Science (Cash, 2018 09 Mar, translocation of gut pathobiont drives autotimmunity)  for review. Interested in any clinical trials that are ongoing. Otherwise in usual state of health, no additional skin concerns.    Past Medical History:   Past Medical History:   Diagnosis Date     Fibroadenoma of breast      Past Surgical History:  Past Surgical History:   Procedure Laterality Date     BREAST SURGERY      lump removed from left breast     Social History:  The patient works in [employer].     Family History:  Family History   Problem Relation Age of Onset     Asthma Mother      Hypertension Father      Cancer Paternal Grandmother         leukemia     Thyroid Disease Sister      Breast Cancer No family hx of      Melanoma No family hx of      Skin Cancer No family hx of      Medications:  Current Outpatient Medications   Medication Sig Dispense Refill     clobetasol (TEMOVATE) 0.05 % external cream Apply to scalp three times nightly, cover with saran wrap/shower cap, then rinse off in morning. 120 g 3     NONFORMULARY Please dispense one wig prosthesis. 1 each 3     Prenatal Multivit-Min-Fe-FA (PRE- PO) Take by mouth daily       clobetasol propionate 0.05 % SHAM Apply sparingly to dry scalp once daily as needed.  Leave in place for 15 minutes then add water, lather and rinse thoroughly. (Patient not taking: Reported on 2019) 1 Bottle 3     ezetimibe (ZETIA) 10 MG tablet Take 1 tablet (10 mg) by mouth daily (Patient not taking: Reported on 2019) 90 tablet 1     fexofenadine (ALLEGRA) 180 MG tablet Take 1 tablet (180 mg) by mouth daily (Patient not taking: Reported on 2019) 90 tablet 1     KARIVA 0.15-0.02/0.01 MG (/) per tablet TAKE 1 TABLET BY MOUTH EVERY DAY (Patient not taking: Reported on 2019) 28 tablet 0     simvastatin (ZOCOR) 20 MG tablet Take 2 tablets (40 mg) by mouth At Bedtime (Patient not taking: Reported on 2019) 180 tablet 1     tofacitinib (XELJANZ) 5 MG tablet Take 1 tablet (5 mg) by mouth 2 times daily (Patient not taking: Reported on 2019) 180  tablet 3     Allergies:  Allergies   Allergen Reactions     No Clinical Screening - See Comments Unknown     duracef     Review of Systems:  - Skin: The patient denies any new rash, pruritus, or lesions that are symptomatic, changing or bleeding, except as per HPI.  - Constitutional: Otherwise in usual state of health.    Physical exam:  GEN: This is a well developed, well-nourished female in no acute distress, in a pleasant mood.    SKIN:  Focused examination of the face, eyebrows, eyelashes performed. Photos of scalp reviewed.  - Harris Skin Type I-II  - On the scalp, there are scattered vellus hairs.   - Eyebrows, eyelashes absent.    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 Discussed options today at length including systemic therapies such as tofacitinib, methotrexate, and Cellcept and provided handouts for each. Counseled that tofacitinib is recently popular due to success in challenging patients who have failed other therapies. Reviewed articles she brings in but counseled that in the majority of these, they are preliminary basic science articles in human/mouse models with varying diseases such as lupus, diabetes, etc. which makes it hard to extrapolate to humans with AA. At this time, she will continue to consider systemic therapy but in meantime, would like to get in touch with our research team for possible research opportunities. It sounds as those the poop microbiome study will not work out at this time but will also double check with Dr. Edwards for any updates. She will continue clobetasol cream intermittently as she is doing. We will plan to meet again in 3 months, sooner if she would like to pursue some of the approaches we have previously discussed.   Continue clobetasol cream up to 3 times weekly under occlusion.    Continue anti-dandruff  shampoo such as Head and Shoulders.    Will discuss clinical trial opportunities with Dr. Edwards and get Lucrecia in contact with our research team.    Consider systemics - tofacitinib, methotrexate, Cellcept. Also could consider squaric acid.    Over 25 minutes was spent during this visit, including >50% of face-to-face time with the patient counseling and coordinating care including the discussion of diagnosis, natural history, treatment, and prognosis as documented above.    CC Alta Sotelo  Follow-up in 3 months, sooner if concerns.    Staff Involved:  Staff only    Danae Fall MD    Department of Dermatology  [facility]: Phone: [phone], Fax: [phone]

## 2020-01-21 ENCOUNTER — MYC MEDICAL ADVICE (OUTPATIENT)
Dept: DERMATOLOGY | Facility: CLINIC | Age: 30
End: 2020-01-21

## 2020-01-22 DIAGNOSIS — L63.9 ALOPECIA AREATA: ICD-10-CM

## 2020-01-22 NOTE — TELEPHONE ENCOUNTER
FYI - I just tried to order a wig prosthesis. Not sure if it printed locally??  Can you set aside for me until next week or see if someone else can sign?

## 2020-03-02 ENCOUNTER — HEALTH MAINTENANCE LETTER (OUTPATIENT)
Age: 30
End: 2020-03-02

## 2020-03-18 ENCOUNTER — TELEPHONE (OUTPATIENT)
Dept: DERMATOLOGY | Facility: CLINIC | Age: 30
End: 2020-03-18

## 2020-03-18 NOTE — TELEPHONE ENCOUNTER
Left a voicemail for Lucrecia asking to call back to discuss the scheduled appointment for 3/25.    We can convert this appointment to a telephone call with Dr. Fall if she has no spots of concern with the skin check.    Clinic number provided to call back or send a "[employer]t message.

## 2020-03-25 ENCOUNTER — VIRTUAL VISIT (OUTPATIENT)
Dept: DERMATOLOGY | Facility: CLINIC | Age: 30
End: 2020-03-25
Payer: COMMERCIAL

## 2020-03-25 DIAGNOSIS — L63.9 ALOPECIA AREATA: Primary | ICD-10-CM

## 2020-03-25 ASSESSMENT — PAIN SCALES - GENERAL: PAINLEVEL: NO PAIN (0)

## 2020-03-25 NOTE — PROGRESS NOTES
"[facility] Dermatology Telephone Note    The patient has been notified of following:     \"This telephone visit will be conducted via a call between you and your physician/provider. We have found that certain health care needs can be provided without the need for a physical exam.  This service lets us provide the care you need with a short phone conversation.  If a prescription is necessary we can send it directly to your pharmacy.  If lab work is needed we can place an order for that and you can then stop by our lab to have the test done at a later time.    If during the course of the call the physician/provider feels a telephone visit is not appropriate, you will not be charged for this service.\"       Dermatology Problem List:  1. Alopecia areata, previously focal patchy disease that responded well to ILK but around April 2017 developed diffuse shedding in context of IUD placement (~Nov 2016). IUD now removed.   - Current rx: none  - Recent prior: intermittent ILK; clobetasol cream under occlusion nightly 3x weekly, anti-dandruff shampoo  - Distant prior rx: prednisone course May-August 2017 with cessation of acute flare, ketoconazole shampoo, minoxidil, and tacrolimus for eyebrows, clobetasol shampoo, fexofenadine, simvastatin/ezetimbe briefly, topical GAYATHRI inhibitor in trial at [place] for 2-3 months with no response.  - Tofacitinib approved 2/2019, but patient decided against starting treatment.   - Pending participation in GI fecal transplant/microbiome (POOP study); working with Dr. Weinberg.    - Labs 5/2017 with significant iron deficiency (ferritin 4), started on iron supplementation, repeat iron studies 6/2019 with ferritin 25. Repeat TSH 3/7/19 wnl.   - SHBG elevated, free testosterone low 12/2018; recheck 6/6/2019 wnl      Encounter Date: Mar 25, 2020    History of Present Illness:  Ms. Sarah A Riedell is a 29 year old female who is being evaluated via a billable telephone visit.  " "  Patient is well-known to me and I have been following her for alopecia universalis. She is currently on no treatment. Since her last visit, she actually has noted some very light hairs regrowing on face, legs, and scalp. This started a few months ago. She is \"literally doing nothing\". She had some questions about the microbiome poop study and rechecking labs.    I have reviewed and updated the patient's Past Medical History, Social History, Family History and Medication List.    Allergies:  No clinical screening - see comments    Assessment/Plan:  1. Alopecia universalis. Unfortuantely persistent and recalcitrant to prednisone, ILK, simvastatin/ezetimbe, topical GAYATHRI inhibitor trial, fexofenadine, and topical steroids. We have previously discussed options today at length including systemic therapies such as tofacitinib, methotrexate, and Cellcept and provided handouts for each. Today however, she is noting some peach fuzz which may be encouraging. For this reason, and also due to the ongoing COVID-19 pandemic, we will not move forward with any additional treatments at this time but will continue monitoring. I have not heard any updates on the microbiome poop study and additionally, research trials are on hold with the COVID-19 pandemic. We will plan to check in again in 3 months with repeat labs. She will call sooner if concerns or issues.    Phone call contact time  Call Started at 08:17 AM  Call Ended at 08:28 AM    Resident (on call): Yesenia Carlisle MD  Faculty (on entire call and performed documentation): Danae Fall MD     Staff Involved:  Resident/Staff    Danae Fall MD    Department of Dermatology  [facility]: Phone: [phone], Fax: [phone]  "

## 2020-03-25 NOTE — PROGRESS NOTES
Dermatology Rooming Note    Sarah A Riedell's goals for this visit include:   Chief Complaint   Patient presents with     Hair Loss     Lucrecia is following up on her AA.      RICHIE Ta

## 2020-05-17 ENCOUNTER — MYC MEDICAL ADVICE (OUTPATIENT)
Dept: DERMATOLOGY | Facility: CLINIC | Age: 30
End: 2020-05-17

## 2020-05-17 DIAGNOSIS — L63.9 ALOPECIA AREATA: Primary | ICD-10-CM

## 2020-05-26 DIAGNOSIS — L63.9 ALOPECIA AREATA: ICD-10-CM

## 2020-05-26 LAB
DEPRECATED CALCIDIOL+CALCIFEROL SERPL-MC: 22 UG/L (ref 20–75)
DHEA-S SERPL-MCNC: 68 UG/DL (ref 35–430)
FERRITIN SERPL-MCNC: 21 NG/ML (ref 12–150)
IRON SATN MFR SERPL: 31 % (ref 15–46)
IRON SERPL-MCNC: 81 UG/DL (ref 35–180)
TIBC SERPL-MCNC: 261 UG/DL (ref 240–430)
TSH SERPL DL<=0.005 MIU/L-ACNC: 2.59 MU/L (ref 0.4–4)

## 2020-05-27 LAB
SHBG SERPL-SCNC: 90 NMOL/L (ref 30–135)
TESTOST FREE SERPL-MCNC: 0.23 NG/DL (ref 0.08–0.74)
TESTOST SERPL-MCNC: 28 NG/DL (ref 8–60)

## 2020-12-20 ENCOUNTER — HEALTH MAINTENANCE LETTER (OUTPATIENT)
Age: 30
End: 2020-12-20

## 2021-03-21 DIAGNOSIS — R89.9 ABNORMAL LABORATORY TEST RESULT: Primary | ICD-10-CM

## 2021-03-21 DIAGNOSIS — L63.9 ALOPECIA AREATA: ICD-10-CM

## 2021-03-21 NOTE — CONFIDENTIAL NOTE
In my chart communication with Ms. Riedell we reviewed her labs from about a year ago. Will recheck iron stores and her Vitamin D. Orders placed.  Andreia Edwards MD

## 2021-04-03 DIAGNOSIS — L65.9 LOSS OF HAIR: ICD-10-CM

## 2021-04-03 DIAGNOSIS — L63.9 AA (ALOPECIA AREATA): Primary | ICD-10-CM

## 2021-04-23 DIAGNOSIS — L65.9 LOSS OF HAIR: ICD-10-CM

## 2021-04-23 DIAGNOSIS — R89.9 ABNORMAL LABORATORY TEST RESULT: ICD-10-CM

## 2021-04-23 DIAGNOSIS — L63.9 AA (ALOPECIA AREATA): ICD-10-CM

## 2021-04-23 DIAGNOSIS — L63.9 ALOPECIA AREATA: ICD-10-CM

## 2021-04-23 LAB
BASOPHILS # BLD AUTO: 0 10E9/L (ref 0–0.2)
BASOPHILS NFR BLD AUTO: 0.9 %
DEPRECATED CALCIDIOL+CALCIFEROL SERPL-MC: 13 UG/L (ref 20–75)
DHEA-S SERPL-MCNC: 90 UG/DL (ref 35–430)
DIFFERENTIAL METHOD BLD: NORMAL
EOSINOPHIL # BLD AUTO: 0.1 10E9/L (ref 0–0.7)
EOSINOPHIL NFR BLD AUTO: 2.1 %
ERYTHROCYTE [DISTWIDTH] IN BLOOD BY AUTOMATED COUNT: 12.7 % (ref 10–15)
FERRITIN SERPL-MCNC: 21 NG/ML (ref 12–150)
HCT VFR BLD AUTO: 42 % (ref 35–47)
HGB BLD-MCNC: 14 G/DL (ref 11.7–15.7)
IRON SATN MFR SERPL: 31 % (ref 15–46)
IRON SERPL-MCNC: 86 UG/DL (ref 35–180)
LYMPHOCYTES # BLD AUTO: 1.6 10E9/L (ref 0.8–5.3)
LYMPHOCYTES NFR BLD AUTO: 36.8 %
MCH RBC QN AUTO: 30.6 PG (ref 26.5–33)
MCHC RBC AUTO-ENTMCNC: 33.3 G/DL (ref 31.5–36.5)
MCV RBC AUTO: 92 FL (ref 78–100)
MONOCYTES # BLD AUTO: 0.4 10E9/L (ref 0–1.3)
MONOCYTES NFR BLD AUTO: 9.2 %
NEUTROPHILS # BLD AUTO: 2.2 10E9/L (ref 1.6–8.3)
NEUTROPHILS NFR BLD AUTO: 51 %
PLATELET # BLD AUTO: 275 10E9/L (ref 150–450)
RBC # BLD AUTO: 4.58 10E12/L (ref 3.8–5.2)
THYROPEROXIDASE AB SERPL-ACNC: <10 IU/ML
TIBC SERPL-MCNC: 281 UG/DL (ref 240–430)
TSH SERPL DL<=0.005 MIU/L-ACNC: 1.92 MU/L (ref 0.4–4)
WBC # BLD AUTO: 4.2 10E9/L (ref 4–11)

## 2021-04-23 PROCEDURE — 84403 ASSAY OF TOTAL TESTOSTERONE: CPT | Mod: 90 | Performed by: DERMATOLOGY

## 2021-04-23 PROCEDURE — 82728 ASSAY OF FERRITIN: CPT | Performed by: DERMATOLOGY

## 2021-04-23 PROCEDURE — 83550 IRON BINDING TEST: CPT | Performed by: DERMATOLOGY

## 2021-04-23 PROCEDURE — 36415 COLL VENOUS BLD VENIPUNCTURE: CPT | Performed by: DERMATOLOGY

## 2021-04-23 PROCEDURE — 85025 COMPLETE CBC W/AUTO DIFF WBC: CPT | Performed by: DERMATOLOGY

## 2021-04-23 PROCEDURE — 84443 ASSAY THYROID STIM HORMONE: CPT | Performed by: DERMATOLOGY

## 2021-04-23 PROCEDURE — 84270 ASSAY OF SEX HORMONE GLOBUL: CPT | Performed by: DERMATOLOGY

## 2021-04-23 PROCEDURE — 83540 ASSAY OF IRON: CPT | Performed by: DERMATOLOGY

## 2021-04-23 PROCEDURE — 82306 VITAMIN D 25 HYDROXY: CPT | Performed by: DERMATOLOGY

## 2021-04-23 PROCEDURE — 82627 DEHYDROEPIANDROSTERONE: CPT | Performed by: DERMATOLOGY

## 2021-04-23 PROCEDURE — 86376 MICROSOMAL ANTIBODY EACH: CPT | Performed by: DERMATOLOGY

## 2021-04-23 PROCEDURE — 99000 SPECIMEN HANDLING OFFICE-LAB: CPT | Performed by: DERMATOLOGY

## 2021-04-24 ENCOUNTER — HEALTH MAINTENANCE LETTER (OUTPATIENT)
Age: 31
End: 2021-04-24

## 2021-04-24 LAB
SHBG SERPL-SCNC: 97 NMOL/L (ref 30–135)
TESTOST FREE SERPL-MCNC: 0.18 NG/DL (ref 0.13–0.92)
TESTOST SERPL-MCNC: 24 NG/DL (ref 8–60)

## 2021-10-03 ENCOUNTER — HEALTH MAINTENANCE LETTER (OUTPATIENT)
Age: 31
End: 2021-10-03

## 2021-12-26 ENCOUNTER — MYC MEDICAL ADVICE (OUTPATIENT)
Dept: DERMATOLOGY | Facility: CLINIC | Age: 31
End: 2021-12-26
Payer: COMMERCIAL

## 2021-12-26 DIAGNOSIS — L63.9 ALOPECIA AREATA: ICD-10-CM

## 2021-12-26 DIAGNOSIS — L63.9 AA (ALOPECIA AREATA): Primary | ICD-10-CM

## 2021-12-26 PROCEDURE — 99207: CPT | Performed by: DERMATOLOGY

## 2022-01-11 NOTE — TELEPHONE ENCOUNTER
Signed order for Scalp prosthesis, and will print hard copy for provider to sign then mail to the patient  Nancy Brooks RN

## 2022-01-12 NOTE — TELEPHONE ENCOUNTER
Update.  There is a signed order in the chart.  Epic will not allow the reprint of this stating it needs a PA?  This is same situation that we ran into before with trying to print another medication Rx?  Not sure how to fix this?    Nancy Brooks RN

## 2022-01-25 NOTE — TELEPHONE ENCOUNTER
RX was not put in correctly. Writer has pended a new one for Dr. Pimentel to sign.    RICHIE Barber

## 2022-05-15 ENCOUNTER — HEALTH MAINTENANCE LETTER (OUTPATIENT)
Age: 32
End: 2022-05-15

## 2022-05-24 ENCOUNTER — OFFICE VISIT (OUTPATIENT)
Dept: DERMATOLOGY | Facility: CLINIC | Age: 32
End: 2022-05-24
Payer: COMMERCIAL

## 2022-05-24 ENCOUNTER — LAB (OUTPATIENT)
Dept: LAB | Facility: CLINIC | Age: 32
End: 2022-05-24
Payer: COMMERCIAL

## 2022-05-24 ENCOUNTER — MYC MEDICAL ADVICE (OUTPATIENT)
Dept: DERMATOLOGY | Facility: CLINIC | Age: 32
End: 2022-05-24

## 2022-05-24 DIAGNOSIS — L65.9 NON-SCARRING ALOPECIA: ICD-10-CM

## 2022-05-24 DIAGNOSIS — L63.9 ALOPECIA AREATA: Primary | ICD-10-CM

## 2022-05-24 DIAGNOSIS — L63.9 ALOPECIA AREATA: ICD-10-CM

## 2022-05-24 LAB
ALBUMIN SERPL-MCNC: 3.7 G/DL (ref 3.4–5)
ALP SERPL-CCNC: 49 U/L (ref 40–150)
ALT SERPL W P-5'-P-CCNC: 17 U/L (ref 0–50)
ANION GAP SERPL CALCULATED.3IONS-SCNC: 7 MMOL/L (ref 3–14)
AST SERPL W P-5'-P-CCNC: 18 U/L (ref 0–45)
BASOPHILS # BLD AUTO: 0.1 10E3/UL (ref 0–0.2)
BASOPHILS NFR BLD AUTO: 1 %
BILIRUB SERPL-MCNC: 0.2 MG/DL (ref 0.2–1.3)
BUN SERPL-MCNC: 14 MG/DL (ref 7–30)
CALCIUM SERPL-MCNC: 9.1 MG/DL (ref 8.5–10.1)
CHLORIDE BLD-SCNC: 107 MMOL/L (ref 94–109)
CO2 SERPL-SCNC: 26 MMOL/L (ref 20–32)
CREAT SERPL-MCNC: 0.89 MG/DL (ref 0.52–1.04)
EOSINOPHIL # BLD AUTO: 0.1 10E3/UL (ref 0–0.7)
EOSINOPHIL NFR BLD AUTO: 2 %
ERYTHROCYTE [DISTWIDTH] IN BLOOD BY AUTOMATED COUNT: 11.9 % (ref 10–15)
FERRITIN SERPL-MCNC: 50 NG/ML (ref 12–150)
GFR SERPL CREATININE-BSD FRML MDRD: 88 ML/MIN/1.73M2
GLUCOSE BLD-MCNC: 99 MG/DL (ref 70–99)
HCT VFR BLD AUTO: 42.7 % (ref 35–47)
HGB BLD-MCNC: 14.3 G/DL (ref 11.7–15.7)
IMM GRANULOCYTES # BLD: 0 10E3/UL
IMM GRANULOCYTES NFR BLD: 0 %
IRON SATN MFR SERPL: 24 % (ref 15–46)
IRON SERPL-MCNC: 82 UG/DL (ref 35–180)
LYMPHOCYTES # BLD AUTO: 2.2 10E3/UL (ref 0.8–5.3)
LYMPHOCYTES NFR BLD AUTO: 40 %
MCH RBC QN AUTO: 30 PG (ref 26.5–33)
MCHC RBC AUTO-ENTMCNC: 33.5 G/DL (ref 31.5–36.5)
MCV RBC AUTO: 90 FL (ref 78–100)
MONOCYTES # BLD AUTO: 0.4 10E3/UL (ref 0–1.3)
MONOCYTES NFR BLD AUTO: 7 %
NEUTROPHILS # BLD AUTO: 2.7 10E3/UL (ref 1.6–8.3)
NEUTROPHILS NFR BLD AUTO: 50 %
NRBC # BLD AUTO: 0 10E3/UL
NRBC BLD AUTO-RTO: 0 /100
PLATELET # BLD AUTO: 350 10E3/UL (ref 150–450)
POTASSIUM BLD-SCNC: 3.5 MMOL/L (ref 3.4–5.3)
PROT SERPL-MCNC: 7.6 G/DL (ref 6.8–8.8)
RBC # BLD AUTO: 4.76 10E6/UL (ref 3.8–5.2)
SODIUM SERPL-SCNC: 140 MMOL/L (ref 133–144)
T3FREE SERPL-MCNC: 2.4 PG/ML (ref 2.3–4.2)
TIBC SERPL-MCNC: 337 UG/DL (ref 240–430)
TSH SERPL DL<=0.005 MIU/L-ACNC: 1.74 MU/L (ref 0.4–4)
WBC # BLD AUTO: 5.5 10E3/UL (ref 4–11)

## 2022-05-24 PROCEDURE — 83550 IRON BINDING TEST: CPT | Performed by: PATHOLOGY

## 2022-05-24 PROCEDURE — 80050 GENERAL HEALTH PANEL: CPT | Performed by: PATHOLOGY

## 2022-05-24 PROCEDURE — 84270 ASSAY OF SEX HORMONE GLOBUL: CPT | Performed by: DERMATOLOGY

## 2022-05-24 PROCEDURE — 99213 OFFICE O/P EST LOW 20 MIN: CPT | Performed by: DERMATOLOGY

## 2022-05-24 PROCEDURE — 84481 FREE ASSAY (FT-3): CPT | Performed by: DERMATOLOGY

## 2022-05-24 PROCEDURE — 84403 ASSAY OF TOTAL TESTOSTERONE: CPT | Performed by: DERMATOLOGY

## 2022-05-24 PROCEDURE — 36415 COLL VENOUS BLD VENIPUNCTURE: CPT | Performed by: PATHOLOGY

## 2022-05-24 PROCEDURE — 82785 ASSAY OF IGE: CPT | Performed by: DERMATOLOGY

## 2022-05-24 PROCEDURE — 82627 DEHYDROEPIANDROSTERONE: CPT | Performed by: DERMATOLOGY

## 2022-05-24 PROCEDURE — 82306 VITAMIN D 25 HYDROXY: CPT | Performed by: DERMATOLOGY

## 2022-05-24 PROCEDURE — 82728 ASSAY OF FERRITIN: CPT | Performed by: PATHOLOGY

## 2022-05-24 PROCEDURE — 86376 MICROSOMAL ANTIBODY EACH: CPT | Performed by: DERMATOLOGY

## 2022-05-24 ASSESSMENT — PAIN SCALES - GENERAL: PAINLEVEL: NO PAIN (0)

## 2022-05-24 NOTE — NURSING NOTE
Dermatology Rooming Note    Sarah A Riedell's goals for this visit include:   Chief Complaint   Patient presents with     Hair Loss     Lucrecia is here for a Alopecia follow-up and says her condition has remained the same since she was last seen.     Cristian Contreras, EMT

## 2022-05-24 NOTE — LETTER
5/24/2022       RE: Sarah A Riedell  [address] [place] [address]     Dear Colleague,    Thank you for referring your patient, Sarah A Riedell, to the [facility] DERMATOLOGY CLINIC [place] at [facility]. Please see a copy of my visit note below.    [facility] Dermatology Note  Encounter Date: May 24, 2022  Office Visit     Dermatology Problem List:  1. Alopecia areata, previously focal patchy disease that responded well to ILK but around April 2017 developed diffuse shedding in context of IUD placement (~Nov 2016). IUD now removed.   - Current rx: none  - Recent prior: intermittent ILK; clobetasol cream under occlusion nightly 3x weekly, anti-dandruff shampoo  - Distant prior rx: prednisone course May-August 2017 with cessation of acute flare, ketoconazole shampoo, minoxidil, and tacrolimus for eyebrows, clobetasol shampoo, fexofenadine, simvastatin/ezetimbe briefly, topical GAYATHRI inhibitor in trial at [place] for 2-3 months with no response.  - Tofacitinib approved 2/2019, but patient decided against starting treatment.   - Pending participation in GI fecal transplant/microbiome (POOP study); working with Dr. Weinberg.    - Labs 5/2017 with significant iron deficiency (ferritin 4), started on iron supplementation, repeat iron studies 6/2019 with ferritin 25. Repeat TSH 3/7/19 wnl.   - SHBG elevated, free testosterone low 12/2018; recheck 6/6/2019 wnl  2. Melanoma in mother  ____________________________________________    Assessment & Plan:    # Alopecia universalis. Chronic, stable.   - Labs ordered: CBC w/ diff, CMP, DHEA, Ferritin, IgE, testosterone free and total, T3, iron, Vitamin D deficiency, TSH with free T4 reflex  - Future consideration: Dupixent. Dicussed risks/benefits. Discussed that if IgE levels are high, the patient may have a better outcome with Dupixent. Will consider pending lab work-up as above.   - Ask  patient to Bishnu Edwards regarding participation in GI fecal microbiome study (POOP study) for alopecia areata.     Procedures Performed:   None    Follow-up: prn for new or changing lesions    Staff and Scribe:      Scribe Disclosure:  I, Jackeline Kemi, am serving as a scribe to document services personally performed by Elijah Wilson MD based on data collection and the provider's statements to me.     Provider Disclosure:   The documentation recorded by the scribe accurately reflects the services I personally performed and the decisions made by me.    Elijah Wilson MD    Department of Dermatology  [facility]: Phone: [phone], Fax:[phone]  [facility] [place]: Phone: [phone] Fax: [phone]  ____________________________________________    CC: Hair Loss (Lucrecia is here for a Alopecia follow-up and says her condition has remained the same since she was last seen.)      HPI:  Ms. Sarah A Riedell is a(n) 31 year old female who presents today as a return patient for hair loss follow-up. Last seen by Eufemia gregg on 3/25/20 for a follow-up on alopecia universalis.     Hair loss has remained the same since last visit.    Patient is otherwise feeling well, without additional skin concerns.    Labs Reviewed:  N/A    Physical Exam:  Vitals: There were no vitals taken for this visit.  SKIN: Focused examination of scalp and face was performed.  - Universal alopecia on the scalp eyebrows eyelashes  - Small light colored vellus hair on the crown of the scalp  - No other lesions of concern on areas examined.     Medications:  Current Outpatient Medications   Medication     KARIVA 0.15-0.02/0.01 MG () per tablet     NONFORMULARY     NONFORMULARY     Prenatal Multivit-Min-Fe-FA (PRE-ZAID PO)     clobetasol (TEMOVATE) 0.05 % external cream     clobetasol propionate 0.05 % SHAM      ezetimibe (ZETIA) 10 MG tablet     fexofenadine (ALLEGRA) 180 MG tablet     simvastatin (ZOCOR) 20 MG tablet     tofacitinib (XELJANZ) 5 MG tablet     No current facility-administered medications for this visit.        Past Medical History:   Patient Active Problem List   Diagnosis     CARDIOVASCULAR SCREENING; LDL GOAL LESS THAN 160     AA (alopecia areata)     Autoimmune disease (H)     Atrophy of skin     Onychodystrophy     Past Medical History:   Diagnosis Date     Alopecia universalis      Fibroadenoma of breast

## 2022-05-24 NOTE — PROGRESS NOTES
[facility] Dermatology Note  Encounter Date: May 24, 2022  Office Visit     Dermatology Problem List:  1. Alopecia areata, previously focal patchy disease that responded well to ILK but around April 2017 developed diffuse shedding in context of IUD placement (~Nov 2016). IUD now removed.   - Current rx: none  - Recent prior: intermittent ILK; clobetasol cream under occlusion nightly 3x weekly, anti-dandruff shampoo  - Distant prior rx: prednisone course May-August 2017 with cessation of acute flare, ketoconazole shampoo, minoxidil, and tacrolimus for eyebrows, clobetasol shampoo, fexofenadine, simvastatin/ezetimbe briefly, topical GAYATHRI inhibitor in trial at [facility] for 2-3 months with no response.  - Tofacitinib approved 2/2019, but patient decided against starting treatment.   - Pending participation in GI fecal transplant/microbiome (POOP study); working with Dr. Weinberg.    - Labs 5/2017 with significant iron deficiency (ferritin 4), started on iron supplementation, repeat iron studies 6/2019 with ferritin 25. Repeat TSH 3/7/19 wnl.   - SHBG elevated, free testosterone low 12/2018; recheck 6/6/2019 wnl  2. Melanoma in mother  ____________________________________________    Assessment & Plan:    # Alopecia universalis. Chronic, stable.   - Labs ordered: CBC w/ diff, CMP, DHEA, Ferritin, IgE, testosterone free and total, T3, iron, Vitamin D deficiency, TSH with free T4 reflex  - Future consideration: Dupixent. Dicussed risks/benefits. Discussed that if IgE levels are high, the patient may have a better outcome with Dupixent. Will consider pending lab work-up as above.   - Ask patient to [facility] regarding participation in GI fecal microbiome study (POOP study) for alopecia areata.     Procedures Performed:   None    Follow-up: prn for new or changing lesions    Staff and Scribe:      Scribe Disclosure:  Jackeline DIALLO, am serving as a scribe to document services personally performed  by Elijah Wilson MD based on data collection and the provider's statements to me.     Provider Disclosure:   The documentation recorded by the scribe accurately reflects the services I personally performed and the decisions made by me.    Elijah Wilson MD    Department of Dermatology  [facility]: Phone: [phone], Fax:[phone]  [facility]: Phone: [phone] Fax: [phone]  ____________________________________________    CC: Hair Loss (Lucrecia is here for a Alopecia follow-up and says her condition has remained the same since she was last seen.)      HPI:  Ms. Sarah A Riedell is a(n) 31 year old female who presents today as a return patient for hair loss follow-up. Last seen by Eufemia gregg on 3/25/20 for a follow-up on alopecia universalis.     Hair loss has remained the same since last visit.    Patient is otherwise feeling well, without additional skin concerns.    Labs Reviewed:  N/A    Physical Exam:  Vitals: There were no vitals taken for this visit.  SKIN: Focused examination of scalp and face was performed.  - Universal alopecia on the scalp eyebrows eyelashes  - Small light colored vellus hair on the crown of the scalp  - No other lesions of concern on areas examined.     Medications:  Current Outpatient Medications   Medication     KARIVA 0.15-0.02/0.01 MG () per tablet     NONFORMULARY     NONFORMULARY     Prenatal Multivit-Min-Fe-FA (PRE-ZAID PO)     clobetasol (TEMOVATE) 0.05 % external cream     clobetasol propionate 0.05 % SHAM     ezetimibe (ZETIA) 10 MG tablet     fexofenadine (ALLEGRA) 180 MG tablet     simvastatin (ZOCOR) 20 MG tablet     tofacitinib (XELJANZ) 5 MG tablet     No current facility-administered medications for this visit.        Past Medical History:   Patient Active Problem List   Diagnosis     CARDIOVASCULAR SCREENING; LDL GOAL LESS  THAN 160     AA (alopecia areata)     Autoimmune disease (H)     Atrophy of skin     Onychodystrophy     Past Medical History:   Diagnosis Date     Alopecia universalis      Fibroadenoma of breast

## 2022-05-25 LAB
DEPRECATED CALCIDIOL+CALCIFEROL SERPL-MC: 41 UG/L (ref 20–75)
DHEA-S SERPL-MCNC: 78 UG/DL (ref 35–430)
IGE SERPL-ACNC: 21 KU/L (ref 0–114)
SHBG SERPL-SCNC: 359 NMOL/L (ref 30–135)
THYROPEROXIDASE AB SERPL-ACNC: <10 IU/ML

## 2022-05-26 ENCOUNTER — MYC MEDICAL ADVICE (OUTPATIENT)
Dept: DERMATOLOGY | Facility: CLINIC | Age: 32
End: 2022-05-26
Payer: COMMERCIAL

## 2022-05-26 DIAGNOSIS — Z83.49 FAMILY HISTORY OF HYPOTHYROIDISM: Primary | ICD-10-CM

## 2022-05-26 DIAGNOSIS — L63.9 ALOPECIA AREATA: ICD-10-CM

## 2022-05-26 LAB
TESTOST FREE SERPL-MCNC: 0.03 NG/DL
TESTOST SERPL-MCNC: 11 NG/DL (ref 8–60)

## 2022-09-10 ENCOUNTER — HEALTH MAINTENANCE LETTER (OUTPATIENT)
Age: 32
End: 2022-09-10

## 2022-10-26 ENCOUNTER — OFFICE VISIT (OUTPATIENT)
Dept: ENDOCRINOLOGY | Facility: CLINIC | Age: 32
End: 2022-10-26
Payer: COMMERCIAL

## 2022-10-26 ENCOUNTER — LAB (OUTPATIENT)
Dept: LAB | Facility: CLINIC | Age: 32
End: 2022-10-26
Payer: COMMERCIAL

## 2022-10-26 VITALS
HEART RATE: 83 BPM | WEIGHT: 184 LBS | BODY MASS INDEX: 29.7 KG/M2 | SYSTOLIC BLOOD PRESSURE: 115 MMHG | DIASTOLIC BLOOD PRESSURE: 77 MMHG

## 2022-10-26 DIAGNOSIS — Z83.49 FAMILY HISTORY OF HYPOTHYROIDISM: ICD-10-CM

## 2022-10-26 DIAGNOSIS — L63.9 ALOPECIA AREATA: ICD-10-CM

## 2022-10-26 PROCEDURE — 83498 ASY HYDROXYPROGESTERONE 17-D: CPT

## 2022-10-26 PROCEDURE — 83525 ASSAY OF INSULIN: CPT

## 2022-10-26 PROCEDURE — 84443 ASSAY THYROID STIM HORMONE: CPT

## 2022-10-26 PROCEDURE — 84403 ASSAY OF TOTAL TESTOSTERONE: CPT

## 2022-10-26 PROCEDURE — 80048 BASIC METABOLIC PNL TOTAL CA: CPT

## 2022-10-26 PROCEDURE — 82670 ASSAY OF TOTAL ESTRADIOL: CPT

## 2022-10-26 PROCEDURE — 36415 COLL VENOUS BLD VENIPUNCTURE: CPT

## 2022-10-26 PROCEDURE — 84270 ASSAY OF SEX HORMONE GLOBUL: CPT

## 2022-10-26 PROCEDURE — 84439 ASSAY OF FREE THYROXINE: CPT

## 2022-10-26 PROCEDURE — 99204 OFFICE O/P NEW MOD 45 MIN: CPT | Performed by: INTERNAL MEDICINE

## 2022-10-26 RX ORDER — DROSPIRENONE AND ETHINYL ESTRADIOL 0.02-3(28)
KIT ORAL
COMMUNITY
Start: 2022-08-15

## 2022-10-26 NOTE — PATIENT INSTRUCTIONS
GLP-1 Medications  - Saxenda (liraglutide)-- daily  - Ozempic (semaglutide) weekly  - Trulicity (dulaglutide) weekly (easy injection      Phentermine-- stimulant pill  Phentermine/Topiramate combo pill-- Qysmia (extended release)    Metformin (insulin resistance)      Check labs-- check estradiol level, insulin glucose, progesterone    Goal in 3 months-- 9lb weight loss

## 2022-10-26 NOTE — LETTER
10/26/2022         RE: Sarah A Riedell  [address] [place] [address]        Dear Colleague,    Thank you for referring your patient, Sarah A Riedell, to the [facility] [place]. Please see a copy of my visit note below.    Sarah A Riedell is a 32 year old yo female who presents today for evaluation of elevated SHBG. She was referred by her dermatologist. She has a personal history of alopecia areata.    Subjective:  Sarah A Riedell is a 32 year old female   Chief Complaint   Patient presents with     Endocrine Problem     Sex Hormone Binding Globulin - Elevated       1) Elevation of SHBG  HPI: Diagnosed with alopecia age 24 (approx 2014)-- developed dime sized lesions all over head, improved, then worsened around 2016. Since then has been significant-- has tried long list of treatments (including light therapy, prednisone, etc) without success. Now regularly wears wigs, missing eyebrows/eyelashes.    Menses- menarche age 10. Has been regular since then, no trouble missing cycles. Was on/off OCPs during 20s,   Had IUD placed 2018 around Thanksgiving time  Had Mirena  SHBG at that time-- 149  Noted worsening of shedding at that time, had it removed March 2019. No other symptoms from it  Regular menses  Nov 2021 started OCP (Ronel)-- skipping placebo week on continuous hormone weeks  Mild rare cramping, rare occasional spotting  Some mild acne as teen/continuously on chin    Denies headaches, no peripheral vision changes  Denies breast discharge, rare breast tenderness  Fatigue, easy to develop sleep deficit    Weight gain-- baseline 150, recently 180 and difficult to lose it, mostly over the last two years, working with nutritionists/trainers since 2017. Working with her current one since Spring 2022, no weight loss but has noted change in body composition  Current weight 184, BMI 30    Notes elevation in SHBG since starting OCPs                               Active  diagnoses this visit:     Family history of hypothyroidism  Alopecia areata     ROS: 10 point ROS neg other than the symptoms noted above in the HPI.      Medical, surgical, social, and family histories, medications and allergies reviewed and updated.  Past Medical History:   Diagnosis Date     Alopecia universalis      Fibroadenoma of breast        Past Surgical History:   Procedure Laterality Date     BREAST SURGERY      lump removed from left breast       Allergies:  No clinical screening - see comments    Social History     Tobacco Use     Smoking status: Never     Smokeless tobacco: Never   Substance Use Topics     Alcohol use: Yes     Alcohol/week: 0.0 standard drinks     Comment: socially       Family History   Problem Relation Age of Onset     Asthma Mother      Hypertension Father      Cancer Paternal Grandmother         leukemia     Thyroid Disease Sister      Breast Cancer No family hx of      Melanoma No family hx of      Skin Cancer No family hx of              Objective:  /77   Pulse 83   Wt 83.5 kg (184 lb)   BMI 29.70 kg/m      Exam:  Constitutional: healthy, alert, no acute distress  Head: Normocephalic. No masses, lesions, no exophthalmos/proptosis, wig in place with absence of eyelashes/eyebrows  ENT: full thyroid at uppler limit of normal size, no palpable nodules, no cervical lymph nodes  Respiratory: nonlabored  Gastrointestinal: Abdomen soft, non-tender.  Musculoskeletal: extremities normal- no gross deformities noted, gait normal and normal muscle tone  Skin: no suspicious lesions or rashes  Neurologic: Gait normal. sensation grossly intact  Psychiatric: mentation appears normal, calm        Lab Results   Component Value Date/Time    TSH 1.74 05/24/2022 03:46 PM    TSH 1.92 04/23/2021 08:57 AM     Last Comprehensive Metabolic Panel:  Sodium   Date Value Ref Range Status   05/24/2022 140 133 - 144 mmol/L Final   01/24/2019 139 133 - 144 mmol/L Final     Potassium   Date Value Ref  Range Status   05/24/2022 3.5 3.4 - 5.3 mmol/L Final   01/24/2019 3.7 3.4 - 5.3 mmol/L Final     Chloride   Date Value Ref Range Status   05/24/2022 107 94 - 109 mmol/L Final   01/24/2019 104 94 - 109 mmol/L Final     Carbon Dioxide   Date Value Ref Range Status   01/24/2019 28 20 - 32 mmol/L Final     Carbon Dioxide (CO2)   Date Value Ref Range Status   05/24/2022 26 20 - 32 mmol/L Final     Anion Gap   Date Value Ref Range Status   05/24/2022 7 3 - 14 mmol/L Final   01/24/2019 6 3 - 14 mmol/L Final     Glucose   Date Value Ref Range Status   05/24/2022 99 70 - 99 mg/dL Final   01/24/2019 81 70 - 99 mg/dL Final     Urea Nitrogen   Date Value Ref Range Status   05/24/2022 14 7 - 30 mg/dL Final   01/24/2019 16 7 - 30 mg/dL Final     Creatinine   Date Value Ref Range Status   05/24/2022 0.89 0.52 - 1.04 mg/dL Final   01/24/2019 0.95 0.52 - 1.04 mg/dL Final     GFR Estimate   Date Value Ref Range Status   05/24/2022 88 >60 mL/min/1.73m2 Final     Comment:     Effective December 21, 2021 eGFRcr in adults is calculated using the 2021 CKD-EPI creatinine equation which includes age and gender (Richard reaves al., NEJM, DOI: 10.1056/QGPDyu3613757)   01/24/2019 81 >60 mL/min/[1.73_m2] Final     Comment:     Non  GFR Calc  Starting 12/18/2018, serum creatinine based estimated GFR (eGFR) will be   calculated using the Chronic Kidney Disease Epidemiology Collaboration   (CKD-EPI) equation.       Calcium   Date Value Ref Range Status   05/24/2022 9.1 8.5 - 10.1 mg/dL Final   01/24/2019 8.5 8.5 - 10.1 mg/dL Final           ASSESSMENT / PLAN:  (Z83.49) Family history of hypothyroidism  (L63.9) Alopecia areata    1) Elevated SHBG  -Almost certainly in the setting of initiation of OCPs with estrogen component.  Other potential causes of elevated SHBG is hyperthyroidism (normal TFTs in May), liver dysfunction (normal LFTs in May), obesity  -Currently patient is overall asymptomatic from this.  Vague symptoms attributable to  increased finding of sex hormones such as fatigue, low libido, weight gain etc. can be experienced.  -Could consider transitioning to alternative form of birth control however I am not sure that this would provide substantial benefit to her  -We will recheck level to see if it has adjusted with persistent dosing of OCPs, in addition we will check endogenous estradiol level to ensure no other missing process  -Recheck thyroid function test including free T4, which was not checked in May  -She did have historically elevated level in 2018 likely related to hormonal fluctuations after placement of Mirena.  This has been normal every other check she has had in the interim    2) OBESITY-  Starting weight today  Body mass index is 29.7 kg/m .  1.   GOALS:  Target weight loss > 5% (9 lbs) over the next 3-4 months.  Long-term goal to maintain weight below  150 lbs  2.   DIET:  Portion controlled, carb-controlled diet, currently working with nutritionist, wants to continue on this  3.   BEHAVIOR: start keeping a food record using phone radha or paper log, set goals, sleep hygiene  4.   MEDS: Discussed GLP-1 agonist versus phentermine with eventual transition to phentermine/topiramate combo versus metformin if found to have insulin resistance.  We will plan to check insulin level and glucose level to determine  5.   SURG:  Patient does not meet NIH criteria for WLS  6.   ACTIVITY:  7.   BARRIERS: Various medications for alopecia  8.   TEACHING:  Can return to see RD/CDE for further diet teaching as needed        Return to clinic: 3 months    A total of 49 minutes were spent today 10/26/22 on this visit including chart review, history and counseling, documentation and other activities as detailed above.         Again, thank you for allowing me to participate in the care of your patient.        Sincerely,        Hina Murphy MD

## 2022-10-26 NOTE — PROGRESS NOTES
Sarah A Riedell is a 32 year old yo female who presents today for evaluation of elevated SHBG. She was referred by her dermatologist. She has a personal history of alopecia areata.    Subjective:  Sarah A Riedell is a 32 year old female   Chief Complaint   Patient presents with     Endocrine Problem     Sex Hormone Binding Globulin - Elevated       1) Elevation of SHBG  HPI: Diagnosed with alopecia age 24 (approx 2014)-- developed dime sized lesions all over head, improved, then worsened around 2016. Since then has been significant-- has tried long list of treatments (including light therapy, prednisone, etc) without success. Now regularly wears wigs, missing eyebrows/eyelashes.    Menses- menarche age 10. Has been regular since then, no trouble missing cycles. Was on/off OCPs during 20s,   Had IUD placed 2018 around Thanksgiving time  Had Mirena  SHBG at that time-- 149  Noted worsening of shedding at that time, had it removed March 2019. No other symptoms from it  Regular menses  Nov 2021 started OCP (Ronel)-- skipping placebo week on continuous hormone weeks  Mild rare cramping, rare occasional spotting  Some mild acne as teen/continuously on chin    Denies headaches, no peripheral vision changes  Denies breast discharge, rare breast tenderness  Fatigue, easy to develop sleep deficit    Weight gain-- baseline 150, recently 180 and difficult to lose it, mostly over the last two years, working with nutritionists/trainers since 2017. Working with her current one since Spring 2022, no weight loss but has noted change in body composition  Current weight 184, BMI 30    Notes elevation in SHBG since starting OCPs                               Active diagnoses this visit:     Family history of hypothyroidism  Alopecia areata     ROS: 10 point ROS neg other than the symptoms noted above in the HPI.      Medical, surgical, social, and family histories, medications and allergies reviewed and updated.  Past Medical History:    Diagnosis Date     Alopecia universalis      Fibroadenoma of breast        Past Surgical History:   Procedure Laterality Date     BREAST SURGERY      lump removed from left breast       Allergies:  No clinical screening - see comments    Social History     Tobacco Use     Smoking status: Never     Smokeless tobacco: Never   Substance Use Topics     Alcohol use: Yes     Alcohol/week: 0.0 standard drinks     Comment: socially       Family History   Problem Relation Age of Onset     Asthma Mother      Hypertension Father      Cancer Paternal Grandmother         leukemia     Thyroid Disease Sister      Breast Cancer No family hx of      Melanoma No family hx of      Skin Cancer No family hx of              Objective:  /77   Pulse 83   Wt 83.5 kg (184 lb)   BMI 29.70 kg/m      Exam:  Constitutional: healthy, alert, no acute distress  Head: Normocephalic. No masses, lesions, no exophthalmos/proptosis, wig in place with absence of eyelashes/eyebrows  ENT: full thyroid at uppler limit of normal size, no palpable nodules, no cervical lymph nodes  Respiratory: nonlabored  Gastrointestinal: Abdomen soft, non-tender.  Musculoskeletal: extremities normal- no gross deformities noted, gait normal and normal muscle tone  Skin: no suspicious lesions or rashes  Neurologic: Gait normal. sensation grossly intact  Psychiatric: mentation appears normal, calm        Lab Results   Component Value Date/Time    TSH 1.74 05/24/2022 03:46 PM    TSH 1.92 04/23/2021 08:57 AM     Last Comprehensive Metabolic Panel:  Sodium   Date Value Ref Range Status   05/24/2022 140 133 - 144 mmol/L Final   01/24/2019 139 133 - 144 mmol/L Final     Potassium   Date Value Ref Range Status   05/24/2022 3.5 3.4 - 5.3 mmol/L Final   01/24/2019 3.7 3.4 - 5.3 mmol/L Final     Chloride   Date Value Ref Range Status   05/24/2022 107 94 - 109 mmol/L Final   01/24/2019 104 94 - 109 mmol/L Final     Carbon Dioxide   Date Value Ref Range Status   01/24/2019 28  20 - 32 mmol/L Final     Carbon Dioxide (CO2)   Date Value Ref Range Status   05/24/2022 26 20 - 32 mmol/L Final     Anion Gap   Date Value Ref Range Status   05/24/2022 7 3 - 14 mmol/L Final   01/24/2019 6 3 - 14 mmol/L Final     Glucose   Date Value Ref Range Status   05/24/2022 99 70 - 99 mg/dL Final   01/24/2019 81 70 - 99 mg/dL Final     Urea Nitrogen   Date Value Ref Range Status   05/24/2022 14 7 - 30 mg/dL Final   01/24/2019 16 7 - 30 mg/dL Final     Creatinine   Date Value Ref Range Status   05/24/2022 0.89 0.52 - 1.04 mg/dL Final   01/24/2019 0.95 0.52 - 1.04 mg/dL Final     GFR Estimate   Date Value Ref Range Status   05/24/2022 88 >60 mL/min/1.73m2 Final     Comment:     Effective December 21, 2021 eGFRcr in adults is calculated using the 2021 CKD-EPI creatinine equation which includes age and gender (Richard reaves al., NEJ, DOI: 10.1056/[id number])   01/24/2019 81 >60 mL/min/[1.73_m2] Final     Comment:     Non  GFR Calc  Starting 12/18/2018, serum creatinine based estimated GFR (eGFR) will be   calculated using the Chronic Kidney Disease Epidemiology Collaboration   (CKD-EPI) equation.       Calcium   Date Value Ref Range Status   05/24/2022 9.1 8.5 - 10.1 mg/dL Final   01/24/2019 8.5 8.5 - 10.1 mg/dL Final           ASSESSMENT / PLAN:  (Z83.49) Family history of hypothyroidism  (L63.9) Alopecia areata    1) Elevated SHBG  -Almost certainly in the setting of initiation of OCPs with estrogen component.  Other potential causes of elevated SHBG is hyperthyroidism (normal TFTs in May), liver dysfunction (normal LFTs in May), obesity  -Currently patient is overall asymptomatic from this.  Vague symptoms attributable to increased finding of sex hormones such as fatigue, low libido, weight gain etc. can be experienced.  -Could consider transitioning to alternative form of birth control however I am not sure that this would provide substantial benefit to her  -We will recheck level to see if it  has adjusted with persistent dosing of OCPs, in addition we will check endogenous estradiol level to ensure no other missing process  -Recheck thyroid function test including free T4, which was not checked in May  -She did have historically elevated level in 2018 likely related to hormonal fluctuations after placement of Mirena.  This has been normal every other check she has had in the interim    2) OBESITY-  Starting weight today  Body mass index is 29.7 kg/m .  1.   GOALS:  Target weight loss > 5% (9 lbs) over the next 3-4 months.  Long-term goal to maintain weight below  150 lbs  2.   DIET:  Portion controlled, carb-controlled diet, currently working with nutritionist, wants to continue on this  3.   BEHAVIOR: start keeping a food record using phone radha or paper log, set goals, sleep hygiene  4.   MEDS: Discussed GLP-1 agonist versus phentermine with eventual transition to phentermine/topiramate combo versus metformin if found to have insulin resistance.  We will plan to check insulin level and glucose level to determine  5.   SURG:  Patient does not meet NIH criteria for WLS  6.   ACTIVITY:  7.   BARRIERS: Various medications for alopecia  8.   TEACHING:  Can return to see RD/CDE for further diet teaching as needed        Return to clinic: 3 months    A total of 49 minutes were spent today 10/26/22 on this visit including chart review, history and counseling, documentation and other activities as detailed above.

## 2022-10-27 LAB
ANION GAP SERPL CALCULATED.3IONS-SCNC: 5 MMOL/L (ref 3–14)
BUN SERPL-MCNC: 11 MG/DL (ref 7–30)
CALCIUM SERPL-MCNC: 9 MG/DL (ref 8.5–10.1)
CHLORIDE BLD-SCNC: 106 MMOL/L (ref 94–109)
CO2 SERPL-SCNC: 27 MMOL/L (ref 20–32)
CREAT SERPL-MCNC: 0.79 MG/DL (ref 0.52–1.04)
ESTRADIOL SERPL-MCNC: <5 PG/ML
GFR SERPL CREATININE-BSD FRML MDRD: >90 ML/MIN/1.73M2
GLUCOSE BLD-MCNC: 81 MG/DL (ref 70–99)
INSULIN SERPL-ACNC: 31.9 UU/ML (ref 2.6–24.9)
POTASSIUM BLD-SCNC: 3.8 MMOL/L (ref 3.4–5.3)
SODIUM SERPL-SCNC: 138 MMOL/L (ref 133–144)
T4 FREE SERPL-MCNC: 0.94 NG/DL (ref 0.76–1.46)
TSH SERPL DL<=0.005 MIU/L-ACNC: 2.35 MU/L (ref 0.4–4)

## 2022-10-28 LAB — SHBG SERPL-SCNC: 252 NMOL/L (ref 30–135)

## 2022-11-03 LAB
17OHP SERPL-MCNC: <10 NG/DL
TESTOST FREE SERPL-MCNC: 0.07 NG/DL
TESTOST SERPL-MCNC: 18 NG/DL (ref 8–60)

## 2023-06-03 ENCOUNTER — HEALTH MAINTENANCE LETTER (OUTPATIENT)
Age: 33
End: 2023-06-03

## 2023-07-06 NOTE — TELEPHONE ENCOUNTER
Vasile called via call center and she informed me that she was prescribed 3 medications but feels like the medications aren't helping her currently and would like to possibly be seen to go over other options. I explained that vasile that Dr. Edwards is not in clinic this week but I could get her scheduled with a different provider. Vasile declined and would like to stay with Dr. Edwards, I let her know that I would send a message to our clinic coordinators and also send a message to Dr. Edwards.    What Type Of Note Output Would You Prefer (Optional)?: Standard Output How Severe Are Your Spot(S)?: mild Have Your Spot(S) Been Treated In The Past?: has not been treated Hpi Title: Evaluation of Skin Lesions

## 2023-07-07 ENCOUNTER — TELEPHONE (OUTPATIENT)
Dept: ENDOCRINOLOGY | Facility: CLINIC | Age: 33
End: 2023-07-07
Payer: COMMERCIAL

## 2023-07-07 DIAGNOSIS — Z83.49 FAMILY HISTORY OF HYPOTHYROIDISM: Primary | ICD-10-CM

## 2023-07-07 NOTE — TELEPHONE ENCOUNTER
[facility] Call Center    Phone Message    May a detailed message be left on voicemail: yes     Reason for Call: Lucrecia is reuqesting orders for a full thyroid pannel due to results from a previous lab then she can discuss those results at next visit with Dr. Santana in Aug. 2023     Action Taken: Other: endo    Travel Screening: Not Applicable

## 2023-07-10 NOTE — TELEPHONE ENCOUNTER
"Patient has a follow up appt in August.  She is looking for a \"full thyroid panel\".  She recently had some urine testing thru an online company called [employer].  She states that they informed her that she was not metabolizing cortisol correctly and suggest thyroid testing.  She states there is also a family history of thyroid disease.  She is currently asymptomatic.     Alla Oquendo RN    "

## 2023-07-17 NOTE — TELEPHONE ENCOUNTER
[facility] to notify labs ordered. Ann Marie Crook RN

## 2023-07-20 ENCOUNTER — LAB (OUTPATIENT)
Dept: LAB | Facility: CLINIC | Age: 33
End: 2023-07-20
Payer: COMMERCIAL

## 2023-07-20 DIAGNOSIS — Z83.49 FAMILY HISTORY OF HYPOTHYROIDISM: ICD-10-CM

## 2023-07-20 LAB
T3 SERPL-MCNC: 118 NG/DL (ref 85–202)
T4 FREE SERPL-MCNC: 1.11 NG/DL (ref 0.9–1.7)
TSH SERPL DL<=0.005 MIU/L-ACNC: 1.73 UIU/ML (ref 0.3–4.2)

## 2023-07-20 PROCEDURE — 84439 ASSAY OF FREE THYROXINE: CPT

## 2023-07-20 PROCEDURE — 36415 COLL VENOUS BLD VENIPUNCTURE: CPT

## 2023-07-20 PROCEDURE — 84480 ASSAY TRIIODOTHYRONINE (T3): CPT

## 2023-07-20 PROCEDURE — 84443 ASSAY THYROID STIM HORMONE: CPT

## 2023-07-20 PROCEDURE — 86376 MICROSOMAL ANTIBODY EACH: CPT

## 2023-07-21 LAB — THYROPEROXIDASE AB SERPL-ACNC: <10 IU/ML

## 2023-08-16 ENCOUNTER — OFFICE VISIT (OUTPATIENT)
Dept: ENDOCRINOLOGY | Facility: CLINIC | Age: 33
End: 2023-08-16
Payer: COMMERCIAL

## 2023-08-16 ENCOUNTER — MYC MEDICAL ADVICE (OUTPATIENT)
Dept: ENDOCRINOLOGY | Facility: CLINIC | Age: 33
End: 2023-08-16

## 2023-08-16 VITALS
SYSTOLIC BLOOD PRESSURE: 109 MMHG | BODY MASS INDEX: 32.75 KG/M2 | HEART RATE: 76 BPM | WEIGHT: 202.9 LBS | DIASTOLIC BLOOD PRESSURE: 71 MMHG

## 2023-08-16 DIAGNOSIS — R53.83 OTHER FATIGUE: Primary | ICD-10-CM

## 2023-08-16 PROCEDURE — 99214 OFFICE O/P EST MOD 30 MIN: CPT | Performed by: INTERNAL MEDICINE

## 2023-08-16 NOTE — NURSING NOTE
Chief Complaint   Patient presents with    RECHECK     Family history of hypothyroidism +1 more       Vitals:    08/16/23 0854   BP: 109/71   BP Location: Left arm   Patient Position: Sitting   Cuff Size: Adult Regular   Pulse: 76   Weight: 92 kg (202 lb 14.4 oz)       Body mass index is 32.75 kg/m .    Michael Mercedes MA

## 2023-08-16 NOTE — LETTER
8/16/2023         RE: Sarah A Riedell  [address] [place] [address]        Dear Colleague,    Thank you for referring your patient, Sarah A Riedell, to the [facility] [place]. Please see a copy of my visit note below.    Sarah A Riedell is a 33 year old yo female who presents today for follow-up of hormone abnormalities/weight management . She has a personal history of alopecia areata.Last seen by me in October    Subjective:  Chief Complaint   Patient presents with     RECHECK     Family history of hypothyroidism +1 more       1) Weight management  Weight gain-- baseline 150, recently 180 and difficult to lose it, mostly over the last two years, working with nutritionists/trainers since 2017. Working with her current one since Spring 2022, no weight loss but has noted change in body composition    Wt Readings from Last 10 Encounters:   08/16/23 92 kg (202 lb 14.4 oz)   10/26/22 83.5 kg (184 lb)   10/02/15 71.4 kg (157 lb 6.4 oz)   12/04/14 71.8 kg (158 lb 6.4 oz)   06/12/14 71.7 kg (158 lb)   04/14/14 72.6 kg (160 lb)     Working with  who helps balance macro/micro, physical activity  [place] testing        Smoothie-- strawberry, protein powder, spinach, almond butter  Taco bowl  Mamma Nixon/almond for snack  Tofu stir menendez  LMNT electrolyte drink with coconut water    Will try to do walk 15-20min in the AM, if doesn't have early meeting  45min walking total throughout the day (Will make up difference from morning)  Lifting 2-3x per week    Fasting at 104,   2hr after eating 123, 106     2) Elevation of SHBG  HPI: Diagnosed with alopecia age 24 (approx 2014)-- developed dime sized lesions all over head, improved, then worsened around 2016. Since then has been significant-- has tried long list of treatments (including light therapy, prednisone, etc) without success. Now regularly wears wigs, missing eyebrows/eyelashes.    Menses- menarche age 10. Has been  regular since then, no trouble missing cycles. Was on/off OCPs during 20s,   Had IUD placed 2018 around Thanksgiving time  Had Mirena  SHBG at that time-- 149  Noted worsening of shedding at that time, had it removed March 2019. No other symptoms from it  Regular menses  Nov 2021 started OCP (Ronel)-- skipping placebo week on continuous hormone weeks  Mild rare cramping, rare occasional spotting  Some mild acne as teen/continuously on chin    Denies headaches, no peripheral vision changes  Denies breast discharge, rare breast tenderness  Fatigue, easy to develop sleep deficit     Active diagnoses this visit:  Data Unavailable     ROS: 10 point ROS neg other than the symptoms noted above in the HPI.      Medical, surgical, social, and family histories, medications and allergies reviewed and updated.  Past Medical History:   Diagnosis Date     Alopecia universalis      Fibroadenoma of breast        Past Surgical History:   Procedure Laterality Date     BREAST SURGERY      lump removed from left breast       Allergies:  No clinical screening - see comments    Social History     Tobacco Use     Smoking status: Never     Smokeless tobacco: Never   Substance Use Topics     Alcohol use: Yes     Alcohol/week: 0.0 standard drinks of alcohol     Comment: socially       Family History   Problem Relation Age of Onset     Asthma Mother      Hypertension Father      Cancer Paternal Grandmother         leukemia     Thyroid Disease Sister      Breast Cancer No family hx of      Melanoma No family hx of      Skin Cancer No family hx of      Objective:  /71 (BP Location: Left arm, Patient Position: Sitting, Cuff Size: Adult Regular)   Pulse 76   Wt 92 kg (202 lb 14.4 oz)   BMI 32.75 kg/m      Physical Exam   CONSTITUTIONAL: healthy, alert and NAD, responding appropriately  ENT: normocephalic, no visual evidence of trauma, normal nose and oral mucosa  EYES: conjunctivae and sclerae normal, no exophthalmos or proptosis  THYROID:   no visualized nodules or goiter  LUNGS: no audible wheeze, cough or visible cyanosis, no visible retractions or increased work of breathing  EXTREMITIES: no hand tremors  NEUROLOGY: cranial nerves grossly intact with no obvious deficit.  SKIN:  no visualized skin lesions or rash, no edema visualized  PSYCH: mentation appears normal, normal judgement          Lab Results   Component Value Date/Time    TSH 1.73 07/20/2023 10:11 AM    TSH 2.35 10/26/2022 04:13 PM    TSH 1.92 04/23/2021 08:57 AM    T4 1.11 07/20/2023 10:11 AM    ESTROGEN <5 10/26/2022 04:13 PM     Last Comprehensive Metabolic Panel:  Sodium   Date Value Ref Range Status   10/26/2022 138 133 - 144 mmol/L Final   01/24/2019 139 133 - 144 mmol/L Final     Potassium   Date Value Ref Range Status   10/26/2022 3.8 3.4 - 5.3 mmol/L Final   01/24/2019 3.7 3.4 - 5.3 mmol/L Final     Chloride   Date Value Ref Range Status   10/26/2022 106 94 - 109 mmol/L Final   01/24/2019 104 94 - 109 mmol/L Final     Carbon Dioxide   Date Value Ref Range Status   01/24/2019 28 20 - 32 mmol/L Final     Carbon Dioxide (CO2)   Date Value Ref Range Status   10/26/2022 27 20 - 32 mmol/L Final     Anion Gap   Date Value Ref Range Status   10/26/2022 5 3 - 14 mmol/L Final   01/24/2019 6 3 - 14 mmol/L Final     Glucose   Date Value Ref Range Status   10/26/2022 81 70 - 99 mg/dL Final   01/24/2019 81 70 - 99 mg/dL Final     Urea Nitrogen   Date Value Ref Range Status   10/26/2022 11 7 - 30 mg/dL Final   01/24/2019 16 7 - 30 mg/dL Final     Creatinine   Date Value Ref Range Status   10/26/2022 0.79 0.52 - 1.04 mg/dL Final   01/24/2019 0.95 0.52 - 1.04 mg/dL Final     GFR Estimate   Date Value Ref Range Status   10/26/2022 >90 >60 mL/min/1.73m2 Final     Comment:     Effective December 21, 2021 eGFRcr in adults is calculated using the 2021 CKD-EPI creatinine equation which includes age and gender (Richard et al., NEJM, DOI: 10.1056/YTIDth7152069)   01/24/2019 81 >60 mL/min/[1.73_m2] Final      Comment:     Non  GFR Calc  Starting 12/18/2018, serum creatinine based estimated GFR (eGFR) will be   calculated using the Chronic Kidney Disease Epidemiology Collaboration   (CKD-EPI) equation.       Calcium   Date Value Ref Range Status   10/26/2022 9.0 8.5 - 10.1 mg/dL Final   01/24/2019 8.5 8.5 - 10.1 mg/dL Final           ASSESSMENT / PLAN:  (Z83.49) Family history of hypothyroidism  (L63.9) Alopecia areata    1) OBESITY-  Starting weight today  Body mass index is 32.75 kg/m .  1.   GOALS:  Target weight loss > 5% (9 lbs) over the next 3-4 months.  Long-term goal to maintain weight below  150 lbs  2.   DIET:  Portion controlled, carb-controlled diet, currently working with nutritionist, wants to continue on this  3.   BEHAVIOR: start keeping a food record using phone radha or paper log, set goals, sleep hygiene  4.   MEDS: Discussed metformin use-- previous SUSAN-IR 6.4 (afternoon, not fasting). Will recheck and start metformin if returns elevated again  5.   SURG:  Patient does not meet NIH criteria for WLS  6.   ACTIVITY: Encouraged regular physical activity throughout the day, not in bursts  7.   BARRIERS: Various medications for alopecia  8.   TEACHING:  Can return to see RD/CDE for further diet teaching as needed    Reviewed cortisol fluctuations throughout the day, discussed cushings diagnosis which does not fit with current phenotype. Could consider overnight DST, however doesn't clinically fit at this time and will hold on this testing    2) Elevated SHBG  -Almost certainly in the setting of initiation of OCPs with estrogen component.  Other potential causes of elevated SHBG is hyperthyroidism (normal TFTs in May), liver dysfunction (normal LFTs in May), obesity  -She did have historically elevated level in 2018 likely related to hormonal fluctuations after placement of Mirena.  This has been normal every other check she has had in the interim  - Currently off OCPs    Return to clinic:  8 months    A total of 37 minutes were spent today 08/16/23 on this visit including chart review, history and counseling, documentation and other activities as detailed above.       Again, thank you for allowing me to participate in the care of your patient.        Sincerely,        Hina Murphy MD

## 2023-08-16 NOTE — PROGRESS NOTES
Sarah A Riedell is a 33 year old yo female who presents today for follow-up of hormone abnormalities/weight management . She has a personal history of alopecia areata.Last seen by me in October Subjective:  Chief Complaint   Patient presents with    RECHECK     Family history of hypothyroidism +1 more       1) Weight management  Weight gain-- baseline 150, recently 180 and difficult to lose it, mostly over the last two years, working with nutritionists/trainers since 2017. Working with her current one since Spring 2022, no weight loss but has noted change in body composition    Wt Readings from Last 10 Encounters:   08/16/23 92 kg (202 lb 14.4 oz)   10/26/22 83.5 kg (184 lb)   10/02/15 71.4 kg (157 lb 6.4 oz)   12/04/14 71.8 kg (158 lb 6.4 oz)   06/12/14 71.7 kg (158 lb)   04/14/14 72.6 kg (160 lb)     Working with  who helps balance macro/micro, physical activity  [place] testing        Smoothie-- strawberry, protein powder, spinach, almond butter  Taco bowl  Mamma Nixon/almond for snack  Tofu stir menendez  LMNT electrolyte drink with coconut water    Will try to do walk 15-20min in the AM, if doesn't have early meeting  45min walking total throughout the day (Will make up difference from morning)  Lifting 2-3x per week    Fasting at 104,   2hr after eating 123, 106     2) Elevation of SHBG  HPI: Diagnosed with alopecia age 24 (approx 2014)-- developed dime sized lesions all over head, improved, then worsened around 2016. Since then has been significant-- has tried long list of treatments (including light therapy, prednisone, etc) without success. Now regularly wears wigs, missing eyebrows/eyelashes.    Menses- menarche age 10. Has been regular since then, no trouble missing cycles. Was on/off OCPs during 20s,   Had IUD placed 2018 around Thanksgiving time  Had Mirena  SHBG at that time-- 149  Noted worsening of shedding at that time, had it removed March 2019. No other symptoms from it  Regular  menses  Nov 2021 started OCP (Ronel)-- skipping placebo week on continuous hormone weeks  Mild rare cramping, rare occasional spotting  Some mild acne as teen/continuously on chin    Denies headaches, no peripheral vision changes  Denies breast discharge, rare breast tenderness  Fatigue, easy to develop sleep deficit     Active diagnoses this visit:  Data Unavailable     ROS: 10 point ROS neg other than the symptoms noted above in the HPI.      Medical, surgical, social, and family histories, medications and allergies reviewed and updated.  Past Medical History:   Diagnosis Date    Alopecia universalis     Fibroadenoma of breast        Past Surgical History:   Procedure Laterality Date    BREAST SURGERY      lump removed from left breast       Allergies:  No clinical screening - see comments    Social History     Tobacco Use    Smoking status: Never    Smokeless tobacco: Never   Substance Use Topics    Alcohol use: Yes     Alcohol/week: 0.0 standard drinks of alcohol     Comment: socially       Family History   Problem Relation Age of Onset    Asthma Mother     Hypertension Father     Cancer Paternal Grandmother         leukemia    Thyroid Disease Sister     Breast Cancer No family hx of     Melanoma No family hx of     Skin Cancer No family hx of      Objective:  /71 (BP Location: Left arm, Patient Position: Sitting, Cuff Size: Adult Regular)   Pulse 76   Wt 92 kg (202 lb 14.4 oz)   BMI 32.75 kg/m      Physical Exam   CONSTITUTIONAL: healthy, alert and NAD, responding appropriately  ENT: normocephalic, no visual evidence of trauma, normal nose and oral mucosa  EYES: conjunctivae and sclerae normal, no exophthalmos or proptosis  THYROID:  no visualized nodules or goiter  LUNGS: no audible wheeze, cough or visible cyanosis, no visible retractions or increased work of breathing  EXTREMITIES: no hand tremors  NEUROLOGY: cranial nerves grossly intact with no obvious deficit.  SKIN:  no visualized skin lesions or  rash, no edema visualized  PSYCH: mentation appears normal, normal judgement          Lab Results   Component Value Date/Time    TSH 1.73 07/20/2023 10:11 AM    TSH 2.35 10/26/2022 04:13 PM    TSH 1.92 04/23/2021 08:57 AM    T4 1.11 07/20/2023 10:11 AM    ESTROGEN <5 10/26/2022 04:13 PM     Last Comprehensive Metabolic Panel:  Sodium   Date Value Ref Range Status   10/26/2022 138 133 - 144 mmol/L Final   01/24/2019 139 133 - 144 mmol/L Final     Potassium   Date Value Ref Range Status   10/26/2022 3.8 3.4 - 5.3 mmol/L Final   01/24/2019 3.7 3.4 - 5.3 mmol/L Final     Chloride   Date Value Ref Range Status   10/26/2022 106 94 - 109 mmol/L Final   01/24/2019 104 94 - 109 mmol/L Final     Carbon Dioxide   Date Value Ref Range Status   01/24/2019 28 20 - 32 mmol/L Final     Carbon Dioxide (CO2)   Date Value Ref Range Status   10/26/2022 27 20 - 32 mmol/L Final     Anion Gap   Date Value Ref Range Status   10/26/2022 5 3 - 14 mmol/L Final   01/24/2019 6 3 - 14 mmol/L Final     Glucose   Date Value Ref Range Status   10/26/2022 81 70 - 99 mg/dL Final   01/24/2019 81 70 - 99 mg/dL Final     Urea Nitrogen   Date Value Ref Range Status   10/26/2022 11 7 - 30 mg/dL Final   01/24/2019 16 7 - 30 mg/dL Final     Creatinine   Date Value Ref Range Status   10/26/2022 0.79 0.52 - 1.04 mg/dL Final   01/24/2019 0.95 0.52 - 1.04 mg/dL Final     GFR Estimate   Date Value Ref Range Status   10/26/2022 >90 >60 mL/min/1.73m2 Final     Comment:     Effective December 21, 2021 eGFRcr in adults is calculated using the 2021 CKD-EPI creatinine equation which includes age and gender (Richard reaves al., NEJM, DOI: 10.1056/[id number])   01/24/2019 81 >60 mL/min/[1.73_m2] Final     Comment:     Non  GFR Calc  Starting 12/18/2018, serum creatinine based estimated GFR (eGFR) will be   calculated using the Chronic Kidney Disease Epidemiology Collaboration   (CKD-EPI) equation.       Calcium   Date Value Ref Range Status   10/26/2022 9.0  8.5 - 10.1 mg/dL Final   01/24/2019 8.5 8.5 - 10.1 mg/dL Final           ASSESSMENT / PLAN:  (Z83.49) Family history of hypothyroidism  (L63.9) Alopecia areata    1) OBESITY-  Starting weight today  Body mass index is 32.75 kg/m .  1.   GOALS:  Target weight loss > 5% (9 lbs) over the next 3-4 months.  Long-term goal to maintain weight below  150 lbs  2.   DIET:  Portion controlled, carb-controlled diet, currently working with nutritionist, wants to continue on this  3.   BEHAVIOR: start keeping a food record using phone radha or paper log, set goals, sleep hygiene  4.   MEDS: Discussed metformin use-- previous SUSAN-IR 6.4 (afternoon, not fasting). Will recheck and start metformin if returns elevated again  5.   SURG:  Patient does not meet NIH criteria for WLS  6.   ACTIVITY: Encouraged regular physical activity throughout the day, not in bursts  7.   BARRIERS: Various medications for alopecia  8.   TEACHING:  Can return to see RD/CDE for further diet teaching as needed    Reviewed cortisol fluctuations throughout the day, discussed cushings diagnosis which does not fit with current phenotype. Could consider overnight DST, however doesn't clinically fit at this time and will hold on this testing    2) Elevated SHBG  -Almost certainly in the setting of initiation of OCPs with estrogen component.  Other potential causes of elevated SHBG is hyperthyroidism (normal TFTs in May), liver dysfunction (normal LFTs in May), obesity  -She did have historically elevated level in 2018 likely related to hormonal fluctuations after placement of Mirena.  This has been normal every other check she has had in the interim  - Currently off OCPs    Return to clinic: 8 months    A total of 37 minutes were spent today 08/16/23 on this visit including chart review, history and counseling, documentation and other activities as detailed above.

## 2023-08-24 ENCOUNTER — LAB (OUTPATIENT)
Dept: LAB | Facility: CLINIC | Age: 33
End: 2023-08-24
Payer: COMMERCIAL

## 2023-08-24 DIAGNOSIS — R53.83 OTHER FATIGUE: ICD-10-CM

## 2023-08-24 LAB
DEPRECATED CALCIDIOL+CALCIFEROL SERPL-MC: 32 UG/L (ref 20–75)
FASTING STATUS PATIENT QL REPORTED: ABNORMAL
GLUCOSE SERPL-MCNC: 105 MG/DL (ref 70–99)
HBA1C MFR BLD: 5.3 % (ref 0–5.6)
INSULIN SERPL-ACNC: 20.3 UU/ML (ref 2.6–24.9)
VIT B12 SERPL-MCNC: 548 PG/ML (ref 232–1245)

## 2023-08-24 PROCEDURE — 36415 COLL VENOUS BLD VENIPUNCTURE: CPT

## 2023-08-24 PROCEDURE — 82607 VITAMIN B-12: CPT

## 2023-08-24 PROCEDURE — 82306 VITAMIN D 25 HYDROXY: CPT

## 2023-08-24 PROCEDURE — 83036 HEMOGLOBIN GLYCOSYLATED A1C: CPT

## 2023-08-24 PROCEDURE — 82947 ASSAY GLUCOSE BLOOD QUANT: CPT

## 2023-08-24 PROCEDURE — 83525 ASSAY OF INSULIN: CPT

## 2023-08-25 NOTE — RESULT ENCOUNTER NOTE
Hello -    Here are my comments about the recent results. SUSAN-IR (calculation of insulin resistance) is 5.26-- anything above 2 indicates insulin resistance. This can help explain the difficulty with weight loss.    Please let us know if you have any questions or concerns.    Regards,  Hina Murphy MD

## 2023-09-17 ENCOUNTER — MYC MEDICAL ADVICE (OUTPATIENT)
Dept: ENDOCRINOLOGY | Facility: CLINIC | Age: 33
End: 2023-09-17
Payer: COMMERCIAL

## 2023-09-17 DIAGNOSIS — R53.83 OTHER FATIGUE: Primary | ICD-10-CM

## 2023-09-17 DIAGNOSIS — E88.819 INSULIN RESISTANCE: ICD-10-CM

## 2023-09-28 RX ORDER — METFORMIN HCL 500 MG
1000 TABLET, EXTENDED RELEASE 24 HR ORAL 2 TIMES DAILY WITH MEALS
Qty: 360 TABLET | Refills: 3 | Status: SHIPPED | OUTPATIENT
Start: 2023-09-28 | End: 2024-09-03

## 2024-02-16 ENCOUNTER — TELEPHONE (OUTPATIENT)
Dept: DERMATOLOGY | Facility: CLINIC | Age: 34
End: 2024-02-16
Payer: COMMERCIAL

## 2024-02-16 NOTE — TELEPHONE ENCOUNTER
[facility] Call Center    Phone Message    May a detailed message be left on voicemail: yes     Reason for Call: Patient set up appt with Dr Brown on 11/22 but wants to know if she can be seen sooner if she makes an appt with one of her team and not her directly - please call back [phone] Thank you    Action Taken: Message routed to:  [facility] ([facility] Derm    Travel Screening: Not Applicable

## 2024-02-20 ENCOUNTER — MYC MEDICAL ADVICE (OUTPATIENT)
Dept: DERMATOLOGY | Facility: CLINIC | Age: 34
End: 2024-02-20
Payer: COMMERCIAL

## 2024-06-14 NOTE — TELEPHONE ENCOUNTER
FUTURE VISIT INFORMATION      FUTURE VISIT INFORMATION:  Date: 6.17.24  Time: 8:10  Location: [facility] [place]  REFERRAL INFORMATION:  Referring provider:  Self  Referring providers clinic:    Reason for visit/diagnosis  Discuss status of the POOP study, potential use of Olumiant or other treatments on the market for my alopecia, scheduled via MyChart request.      RECORDS REQUESTED FROM:       Clinic name Comments Records Status Imaging Status   [facility] Derm 5.15.24, 10.9.23, 9.7.23 + more  [facility]    [facility] 9.28.23  [facility]    Derm 5.24.22  [facility]    3.25.20, 10.8.19 + more  [facility]

## 2024-06-17 ENCOUNTER — LAB (OUTPATIENT)
Dept: LAB | Facility: CLINIC | Age: 34
End: 2024-06-17
Payer: COMMERCIAL

## 2024-06-17 ENCOUNTER — PRE VISIT (OUTPATIENT)
Dept: DERMATOLOGY | Facility: CLINIC | Age: 34
End: 2024-06-17

## 2024-06-17 ENCOUNTER — OFFICE VISIT (OUTPATIENT)
Dept: DERMATOLOGY | Facility: CLINIC | Age: 34
End: 2024-06-17
Payer: COMMERCIAL

## 2024-06-17 VITALS — SYSTOLIC BLOOD PRESSURE: 112 MMHG | HEART RATE: 83 BPM | DIASTOLIC BLOOD PRESSURE: 79 MMHG

## 2024-06-17 DIAGNOSIS — L65.9 LOSS OF HAIR: ICD-10-CM

## 2024-06-17 DIAGNOSIS — L63.9 AA (ALOPECIA AREATA): Primary | ICD-10-CM

## 2024-06-17 DIAGNOSIS — M35.9 AUTOIMMUNE DISEASE (H): ICD-10-CM

## 2024-06-17 DIAGNOSIS — L63.9 AA (ALOPECIA AREATA): ICD-10-CM

## 2024-06-17 LAB
ALBUMIN SERPL BCG-MCNC: 4.6 G/DL (ref 3.5–5.2)
ALP SERPL-CCNC: 59 U/L (ref 40–150)
ALT SERPL W P-5'-P-CCNC: 10 U/L (ref 0–50)
ANION GAP SERPL CALCULATED.3IONS-SCNC: 11 MMOL/L (ref 7–15)
AST SERPL W P-5'-P-CCNC: 17 U/L (ref 0–45)
BASOPHILS # BLD AUTO: 0.1 10E3/UL (ref 0–0.2)
BASOPHILS NFR BLD AUTO: 1 %
BILIRUB SERPL-MCNC: 0.3 MG/DL
BUN SERPL-MCNC: 11.2 MG/DL (ref 6–20)
CALCIUM SERPL-MCNC: 9.7 MG/DL (ref 8.6–10)
CHLORIDE SERPL-SCNC: 102 MMOL/L (ref 98–107)
CREAT SERPL-MCNC: 0.72 MG/DL (ref 0.51–0.95)
DEPRECATED HCO3 PLAS-SCNC: 26 MMOL/L (ref 22–29)
EGFRCR SERPLBLD CKD-EPI 2021: >90 ML/MIN/1.73M2
EOSINOPHIL # BLD AUTO: 0.2 10E3/UL (ref 0–0.7)
EOSINOPHIL NFR BLD AUTO: 3 %
ERYTHROCYTE [DISTWIDTH] IN BLOOD BY AUTOMATED COUNT: 12.7 % (ref 10–15)
FERRITIN SERPL-MCNC: 30 NG/ML (ref 6–175)
GLUCOSE SERPL-MCNC: 101 MG/DL (ref 70–99)
HCT VFR BLD AUTO: 42 % (ref 35–47)
HGB BLD-MCNC: 13.9 G/DL (ref 11.7–15.7)
IMM GRANULOCYTES # BLD: 0 10E3/UL
IMM GRANULOCYTES NFR BLD: 1 %
IRON BINDING CAPACITY (ROCHE): 296 UG/DL (ref 240–430)
IRON SATN MFR SERPL: 25 % (ref 15–46)
IRON SERPL-MCNC: 74 UG/DL (ref 37–145)
LYMPHOCYTES # BLD AUTO: 1.7 10E3/UL (ref 0.8–5.3)
LYMPHOCYTES NFR BLD AUTO: 32 %
MCH RBC QN AUTO: 29.2 PG (ref 26.5–33)
MCHC RBC AUTO-ENTMCNC: 33.1 G/DL (ref 31.5–36.5)
MCV RBC AUTO: 88 FL (ref 78–100)
MONOCYTES # BLD AUTO: 0.4 10E3/UL (ref 0–1.3)
MONOCYTES NFR BLD AUTO: 8 %
NEUTROPHILS # BLD AUTO: 2.9 10E3/UL (ref 1.6–8.3)
NEUTROPHILS NFR BLD AUTO: 55 %
NRBC # BLD AUTO: 0 10E3/UL
NRBC BLD AUTO-RTO: 0 /100
PLATELET # BLD AUTO: 383 10E3/UL (ref 150–450)
POTASSIUM SERPL-SCNC: 4.4 MMOL/L (ref 3.4–5.3)
PROT SERPL-MCNC: 7.2 G/DL (ref 6.4–8.3)
RBC # BLD AUTO: 4.76 10E6/UL (ref 3.8–5.2)
SHBG SERPL-SCNC: 50 NMOL/L (ref 30–135)
SODIUM SERPL-SCNC: 139 MMOL/L (ref 135–145)
TSH SERPL DL<=0.005 MIU/L-ACNC: 1.48 UIU/ML (ref 0.3–4.2)
VIT D+METAB SERPL-MCNC: 25 NG/ML (ref 20–50)
WBC # BLD AUTO: 5.1 10E3/UL (ref 4–11)

## 2024-06-17 PROCEDURE — 82306 VITAMIN D 25 HYDROXY: CPT | Performed by: DERMATOLOGY

## 2024-06-17 PROCEDURE — 99000 SPECIMEN HANDLING OFFICE-LAB: CPT | Performed by: PATHOLOGY

## 2024-06-17 PROCEDURE — 84403 ASSAY OF TOTAL TESTOSTERONE: CPT | Performed by: DERMATOLOGY

## 2024-06-17 PROCEDURE — 83540 ASSAY OF IRON: CPT | Performed by: PATHOLOGY

## 2024-06-17 PROCEDURE — 36415 COLL VENOUS BLD VENIPUNCTURE: CPT | Performed by: PATHOLOGY

## 2024-06-17 PROCEDURE — 83550 IRON BINDING TEST: CPT | Performed by: PATHOLOGY

## 2024-06-17 PROCEDURE — 84270 ASSAY OF SEX HORMONE GLOBUL: CPT | Performed by: DERMATOLOGY

## 2024-06-17 PROCEDURE — 85025 COMPLETE CBC W/AUTO DIFF WBC: CPT | Performed by: PATHOLOGY

## 2024-06-17 PROCEDURE — 80053 COMPREHEN METABOLIC PANEL: CPT | Performed by: PATHOLOGY

## 2024-06-17 PROCEDURE — 82728 ASSAY OF FERRITIN: CPT | Performed by: PATHOLOGY

## 2024-06-17 PROCEDURE — 82627 DEHYDROEPIANDROSTERONE: CPT | Performed by: DERMATOLOGY

## 2024-06-17 PROCEDURE — 99214 OFFICE O/P EST MOD 30 MIN: CPT | Performed by: DERMATOLOGY

## 2024-06-17 PROCEDURE — 84443 ASSAY THYROID STIM HORMONE: CPT | Performed by: PATHOLOGY

## 2024-06-17 ASSESSMENT — PAIN SCALES - GENERAL: PAINLEVEL: NO PAIN (0)

## 2024-06-17 NOTE — NURSING NOTE
Dermatology Rooming Note    Sarah A Riedell's goals for this visit include:   Chief Complaint   Patient presents with    Hair Loss     Get updates on POOP study  Discuss possible treatments that have approved by FDA       Tomeka Chang LPN

## 2024-06-17 NOTE — PROGRESS NOTES
[facility] Dermatology Note  Encounter Date: Jun 17, 2024  Office Visit     Dermatology Problem List:  1. Alopecia areata, previously focal patchy disease that responded well to ILK but around April 2017 developed diffuse shedding in context of IUD placement (~Nov 2016). IUD now removed.  - Current rx: none. Planning to enroll in POOP study fall 2024.  - Recent prior: intermittent ILK; clobetasol cream under occlusion nightly 3x weekly, anti-dandruff shampoo, light therapy wand  - Distant prior rx: prednisone course May-August 2017 with cessation of acute flare, ketoconazole shampoo, topical minoxidil, and tacrolimus for eyebrows, clobetasol shampoo, fexofenadine, simvastatin/ezetimbe briefly, topical GAYATHRI inhibitor in trial at [place] for 2-3 months with no response.  - Tofacitinib approved 2/2019, but patient decided against starting treatment.   - Pending participation in GI fecal transplant/microbiome (POOP study); working with Dr. Weinberg.    - Labs 5/2017 with significant iron deficiency (ferritin 4), started on iron supplementation, repeat iron studies 6/2019 with ferritin 25. Repeat TSH 3/7/19 wnl.   - SHBG elevated, free testosterone low 12/2018; recheck 6/6/2019 wnl  2. Melanoma in mother  3. Insulin resistance  - current tx: 500 mg BID metformin  ____________________________________________    Assessment & Plan:     # Alopecia universalis. Chronic, stable.  - Discussed that there are insulin receptors in hair follicles and insulin resistance can be related to hair thinning.  - SALT score today: 96  - Discussed GAYATHRI inhibitor therapy. There are 2 approved medications and several clinical trials.  - Discussed that POOP study is moving forward and should be ready to begin in the fall.  - Lucrecia would like to pursue the POOP study in the fall. Will send Lucrecia's contact information to .  - Continue to follow with endocrinology. Next appointment is this fall.  - Will repeat  labs today (CBC, CMP, DHEA, testosterone, iron and iron binding, ferritin, vitamin D, TSH).    Procedures Performed:   None    Follow-up: 6-9 months.    Staff and Medical Student:     Staff: Andreia Edwards MD  Medical Student: Ronda Valdovinos MS3    Staff Physician:  I was present with the medical student who participated in the service and in the documentation of the note. I have verified the history and personally performed the physical exam and medical decision making. I agree with the assessment and plan of care as documented in the note.           Andreia Edwards MD  Professor   Department of Dermatology  [facility]: Phone: [phone], Fax:[phone]  [facility]:   Phone: [phone], Fax: [phone]    ____________________________________________    CC: Hair Loss (Get updates on POOP study/Discuss possible treatments that have approved by FDA/)    HPI:  Ms. Sarah A Riedell is a 33 year old female who presents as a return patient for follow-up of hair loss, diagnosed as alopecia universalis.   - Last seen in-clinic 2022 with Dr. Wilson  - Shedding or thinning, or both: little hair growth, some shedding  - Current tx: none  - If using Rogaine, 1 cannister lasts how long: N/A   - Scalp or hair care habits/products: none  Yes, metformin Any new medications, supplements, or products? (please list below)     No Scalp pain   No Scalp burning   No Scalp itching    No Eyebrow changes    Yes, 1 new eyelash Eyelash changes   N/A Beard changes    Yes, some patches of hair on legs Other body hair changes    No Nail changes    No Additional symptoms? (please list below)     - Overall course: stable  - COVID status: none  - Started Metformin November 2023 when she was having trouble losing weight    Patient is otherwise feeling well, in usual state of health, and has no additional skin concerns today.      Labs:  TSH and Vitamin D, insulin reviewed from .    Physical Exam:    GEN: Well developed, well-nourished, in no acute distress, in a pleasant mood.    SKIN: Focused examination of scalp, face, and nails was performed.  - 96% hair loss  - indeterminate fibers on mid occipital scalp  - vellus fibers on lower occipital scalp  - lightly pigmented scattered eyebrow fibers  - significant upper and lower eyelash loss  - vellus fibers on bilateral cheeks  - mild nail pitting  - No other lesions of concern on areas examined.       Medications:  Current Outpatient Medications   Medication Sig Dispense Refill    metFORMIN (GLUCOPHAGE XR) 500 MG 24 hr tablet Take 2 tablets (1,000 mg) by mouth 2 times daily (with meals) 360 tablet 3    NONFORMULARY Please dispense one wig prosthesis. 1 each 3    NONFORMULARY One scalp prosthesis for alopecia areata 1 each 1    clobetasol (TEMOVATE) 0.05 % external cream Apply to scalp three times nightly, cover with saran wrap/shower cap, then rinse off in morning. (Patient not taking: Reported on 3/25/2020) 120 g 3    clobetasol propionate 0.05 % SHAM Apply sparingly to dry scalp once daily as needed.  Leave in place for 15 minutes then add water, lather and rinse thoroughly. (Patient not taking: Reported on 2019) 1 Bottle 3    drospirenone-ethinyl estradiol (XOCHITL) 3-0.02 MG tablet  (Patient not taking: Reported on 2023)      ezetimibe (ZETIA) 10 MG tablet Take 1 tablet (10 mg) by mouth daily (Patient not taking: Reported on 2019) 90 tablet 1    fexofenadine (ALLEGRA) 180 MG tablet Take 1 tablet (180 mg) by mouth daily (Patient not taking: Reported on 2019) 90 tablet 1    KARIVA 0.15-0.02/0.01 MG () per tablet TAKE 1 TABLET BY MOUTH EVERY DAY (Patient not taking: Reported on 2023) 28 tablet 0    Prenatal Multivit-Min-Fe-FA (PRE- PO) Take by mouth daily (Patient not taking: Reported on 2023)      simvastatin (ZOCOR) 20 MG tablet Take 2 tablets (40  mg) by mouth At Bedtime (Patient not taking: Reported on 1/24/2019) 180 tablet 1    tofacitinib (XELJANZ) 5 MG tablet Take 1 tablet (5 mg) by mouth 2 times daily (Patient not taking: Reported on 6/6/2019) 180 tablet 3     No current facility-administered medications for this visit.      Past Medical History:   Patient Active Problem List   Diagnosis    CARDIOVASCULAR SCREENING; LDL GOAL LESS THAN 160    AA (alopecia areata)    Autoimmune disease (H24)    Atrophy of skin    Onychodystrophy     Past Medical History:   Diagnosis Date    Alopecia universalis     Fibroadenoma of breast        CC Referred Self, MD  No address on file on close of this encounter.

## 2024-06-17 NOTE — LETTER
6/17/2024       RE: Sarah A Riedell  [address] [place] [address]     Dear Colleague,    Thank you for referring your patient, Sarah A Riedell, to the [facility] [place] [facility]. Please see a copy of my visit note below.    [facility] Dermatology Note  Encounter Date: Jun 17, 2024  Office Visit     Dermatology Problem List:  1. Alopecia areata, previously focal patchy disease that responded well to ILK but around April 2017 developed diffuse shedding in context of IUD placement (~Nov 2016). IUD now removed.  - Current rx: none. Planning to enroll in POOP study fall 2024.  - Recent prior: intermittent ILK; clobetasol cream under occlusion nightly 3x weekly, anti-dandruff shampoo, light therapy wand  - Distant prior rx: prednisone course May-August 2017 with cessation of acute flare, ketoconazole shampoo, topical minoxidil, and tacrolimus for eyebrows, clobetasol shampoo, fexofenadine, simvastatin/ezetimbe briefly, topical GAYATHRI inhibitor in trial at [place] for 2-3 months with no response.  - Tofacitinib approved 2/2019, but patient decided against starting treatment.   - Pending participation in GI fecal transplant/microbiome (POOP study); working with Dr. Weinberg.    - Labs 5/2017 with significant iron deficiency (ferritin 4), started on iron supplementation, repeat iron studies 6/2019 with ferritin 25. Repeat TSH 3/7/19 wnl.   - SHBG elevated, free testosterone low 12/2018; recheck 6/6/2019 wnl  2. Melanoma in mother  3. Insulin resistance  - current tx: 500 mg BID metformin  ____________________________________________    Assessment & Plan:     # Alopecia universalis. Chronic, stable.  - Discussed that there are insulin receptors in hair follicles and insulin resistance can be related to hair thinning.  - SALT score today: 96  - Discussed GAYATHRI inhibitor therapy. There are 2 approved medications and  several clinical trials.  - Discussed that POOP study is moving forward and should be ready to begin in the fall.  - Lucrecia would like to pursue the POOP study in the fall. Will send Lucrecia's contact information to .  - Continue to follow with endocrinology. Next appointment is this fall.  - Will repeat labs today (CBC, CMP, DHEA, testosterone, iron and iron binding, ferritin, vitamin D, TSH).    Procedures Performed:   None    Follow-up: 6-9 months.    Staff and Medical Student:     Staff: Andreia Edwards MD  Medical Student: Ronda Valdovinos MS3    Staff Physician:  I was present with the medical student who participated in the service and in the documentation of the note. I have verified the history and personally performed the physical exam and medical decision making. I agree with the assessment and plan of care as documented in the note.           Anderia Edwards MD  Professor   Department of Dermatology  [facility]: Phone: [phone], Fax:[phone]  [facility]:   Phone: [phone], Fax: [phone]    ____________________________________________    CC: Hair Loss (Get updates on POOP study/Discuss possible treatments that have approved by FDA/)    HPI:  Ms. Sarah A Riedell is a 33 year old female who presents as a return patient for follow-up of hair loss, diagnosed as alopecia universalis.   - Last seen in-clinic 2022 with Dr. Wilson  - Shedding or thinning, or both: little hair growth, some shedding  - Current tx: none  - If using Rogaine, 1 cannister lasts how long: N/A   - Scalp or hair care habits/products: none  Yes, metformin Any new medications, supplements, or products? (please list below)     No Scalp pain   No Scalp burning   No Scalp itching    No Eyebrow changes    Yes, 1 new eyelash Eyelash changes   N/A Beard changes    Yes, some patches of hair on legs Other body hair  changes    No Nail changes    No Additional symptoms? (please list below)     - Overall course: stable  - COVID status: none  - Started Metformin November 2023 when she was having trouble losing weight    Patient is otherwise feeling well, in usual state of health, and has no additional skin concerns today.     Labs:  TSH and Vitamin D, insulin reviewed from 2023.    Physical Exam:    GEN: Well developed, well-nourished, in no acute distress, in a pleasant mood.    SKIN: Focused examination of scalp, face, and nails was performed.  - 96% hair loss  - indeterminate fibers on mid occipital scalp  - vellus fibers on lower occipital scalp  - lightly pigmented scattered eyebrow fibers  - significant upper and lower eyelash loss  - vellus fibers on bilateral cheeks  - mild nail pitting  - No other lesions of concern on areas examined.       Medications:  Current Outpatient Medications   Medication Sig Dispense Refill     metFORMIN (GLUCOPHAGE XR) 500 MG 24 hr tablet Take 2 tablets (1,000 mg) by mouth 2 times daily (with meals) 360 tablet 3     NONFORMULARY Please dispense one wig prosthesis. 1 each 3     NONFORMULARY One scalp prosthesis for alopecia areata 1 each 1     clobetasol (TEMOVATE) 0.05 % external cream Apply to scalp three times nightly, cover with saran wrap/shower cap, then rinse off in morning. (Patient not taking: Reported on 3/25/2020) 120 g 3     clobetasol propionate 0.05 % SHAM Apply sparingly to dry scalp once daily as needed.  Leave in place for 15 minutes then add water, lather and rinse thoroughly. (Patient not taking: Reported on 1/24/2019) 1 Bottle 3     drospirenone-ethinyl estradiol (XOCHITL) 3-0.02 MG tablet  (Patient not taking: Reported on 8/16/2023)       ezetimibe (ZETIA) 10 MG tablet Take 1 tablet (10 mg) by mouth daily (Patient not taking: Reported on 1/24/2019) 90 tablet 1     fexofenadine (ALLEGRA) 180 MG tablet Take 1 tablet (180 mg) by mouth daily (Patient not taking: Reported on  2019) 90 tablet 1     KARIVA 0.15-0.02/0.01 MG () per tablet TAKE 1 TABLET BY MOUTH EVERY DAY (Patient not taking: Reported on 2023) 28 tablet 0     Prenatal Multivit-Min-Fe-FA (PRE-ZAID PO) Take by mouth daily (Patient not taking: Reported on 2023)       simvastatin (ZOCOR) 20 MG tablet Take 2 tablets (40 mg) by mouth At Bedtime (Patient not taking: Reported on 2019) 180 tablet 1     tofacitinib (XELJANZ) 5 MG tablet Take 1 tablet (5 mg) by mouth 2 times daily (Patient not taking: Reported on 2019) 180 tablet 3     No current facility-administered medications for this visit.      Past Medical History:   Patient Active Problem List   Diagnosis     CARDIOVASCULAR SCREENING; LDL GOAL LESS THAN 160     AA (alopecia areata)     Autoimmune disease (H24)     Atrophy of skin     Onychodystrophy     Past Medical History:   Diagnosis Date     Alopecia universalis      Fibroadenoma of breast        CC Referred Self, MD  No address on file on close of this encounter.      Again, thank you for allowing me to participate in the care of your patient.      Sincerely,    Andreia Edwards MD

## 2024-06-18 LAB — DHEA-S SERPL-MCNC: 131 UG/DL (ref 35–430)

## 2024-06-19 LAB
TESTOST FREE SERPL-MCNC: 0.12 NG/DL
TESTOST SERPL-MCNC: 9 NG/DL (ref 8–60)

## 2024-06-24 ENCOUNTER — TELEPHONE (OUTPATIENT)
Dept: DERMATOLOGY | Facility: CLINIC | Age: 34
End: 2024-06-24
Payer: COMMERCIAL

## 2024-06-24 NOTE — TELEPHONE ENCOUNTER
Left Voicemail (1st Attempt) and Sent Mychart (1st Attempt) for the patient to call back and schedule the following:    Appointment type: FOLLOW UP    Provider: Grace    Return date: 12/2024       Specialty phone number: [phone]    Additonal Notes: NA

## 2024-06-25 ENCOUNTER — TELEPHONE (OUTPATIENT)
Dept: DERMATOLOGY | Facility: CLINIC | Age: 34
End: 2024-06-25
Payer: COMMERCIAL

## 2024-06-25 NOTE — TELEPHONE ENCOUNTER
Sent Projectioneeringt (1st Attempt) and Patient Contacted informing pt of the following:    Appointment type: Return HL   Provider: Dr. Edwards   Return date: Dec 17. 2024  Specialty phone number: [phone]

## 2024-07-07 ENCOUNTER — HEALTH MAINTENANCE LETTER (OUTPATIENT)
Age: 34
End: 2024-07-07

## 2024-09-01 DIAGNOSIS — R53.83 OTHER FATIGUE: ICD-10-CM

## 2024-09-01 DIAGNOSIS — E88.819 INSULIN RESISTANCE: ICD-10-CM

## 2024-09-03 RX ORDER — METFORMIN HCL 500 MG
1000 TABLET, EXTENDED RELEASE 24 HR ORAL 2 TIMES DAILY WITH MEALS
Qty: 360 TABLET | Refills: 0 | Status: SHIPPED | OUTPATIENT
Start: 2024-09-03

## 2024-09-03 NOTE — TELEPHONE ENCOUNTER
Last Written Prescription Date:  9/28/23  Last Fill Quantity: 90,  # refills: 3   Last office visit: 8/16/2023 ; last virtual visit: Visit date not found with prescribing provider:  Dr. Murphy   Future Office Visit: 12/4/24    Requested Prescriptions   Pending Prescriptions Disp Refills    metFORMIN (GLUCOPHAGE XR) 500 MG 24 hr tablet [Pharmacy Med Name: METFORMIN HCL ER TABS 500MG] 360 tablet 3     Sig: TAKE 2 TABLETS TWICE A DAY WITH MEALS       Biguanide Agents Failed - 9/1/2024 11:16 PM        Failed - Medication indicated for associated diagnosis     Medication is associated with one or more of the following diagnoses:     Gestational diabetes mellitus     Hyperinsulinar obesity     Hypersecretion of ovarian androgens    Non-alcoholic fatty liver    Polycystic ovarian syndrome               Pre-diabetes (DM 2 prevention)    Type 2 diabetes mellitus     Weight gain, antipsychotic therapy-induced    Impaired fasting glucose          Failed - Recent (6 mo) or future (90 days) visit within the authorizing provider's specialty     The patient must have completed an in-person or virtual visit within the past 6 months or has a future visit scheduled within the next 90 days with the authorizing provider s specialty.  Urgent care and e-visits do not quality as an office visit for this protocol.          Passed - Patient is age 10 or older        Passed - Patient does NOT have a diagnosis of CHF.        Passed - Medication is active on med list        Passed - Has GFR on file in past 12 months and most recent value is normal        Passed - Patient is not pregnant        Passed - Patient has not had a positive pregnancy test within the past 12 mos.            Refill sent  Ann Marie Crook RN

## 2024-10-15 ENCOUNTER — MYC MEDICAL ADVICE (OUTPATIENT)
Dept: ENDOCRINOLOGY | Facility: CLINIC | Age: 34
End: 2024-10-15
Payer: COMMERCIAL

## 2024-10-15 DIAGNOSIS — E66.811 CLASS 1 OBESITY WITHOUT SERIOUS COMORBIDITY WITH BODY MASS INDEX (BMI) OF 32.0 TO 32.9 IN ADULT, UNSPECIFIED OBESITY TYPE: ICD-10-CM

## 2024-10-15 DIAGNOSIS — E88.819 INSULIN RESISTANCE: Primary | ICD-10-CM

## 2024-10-17 ENCOUNTER — TELEPHONE (OUTPATIENT)
Dept: ENDOCRINOLOGY | Facility: CLINIC | Age: 34
End: 2024-10-17
Payer: COMMERCIAL

## 2024-10-17 NOTE — TELEPHONE ENCOUNTER
Prior Authorization Approval    Medication: WEGOVY 0.25 MG/0.5ML SC SOAJ  Authorization Effective Date: 9/17/2024  Authorization Expiration Date: 5/15/2025  Approved Dose/Quantity: 4 pens per 28 days  Reference #: [id number]   Insurance Company: [employer] Non-Specialty PA's - Phone [phone] Fax [phone]  Expected CoPay: $ 90  CoPay Card Available:      Financial Assistance Needed: Unknown  Which Pharmacy is filling the prescription: [id number] [address]  Pharmacy Notified: Released Rx's  Patient Notified: By pharmacy        PA Initiation    Medication: WEGOVY 0.25 MG/0.5ML SC SOAJ  Insurance Company: [employer] Non-Specialty PA's - Phone [phone] Fax [phone]  Pharmacy Filling the Rx: [id number] [place] [address] AT [facility]  Filling Pharmacy Phone:    Filling Pharmacy Fax:    Start Date: 10/17/2024

## 2024-11-03 ENCOUNTER — MYC MEDICAL ADVICE (OUTPATIENT)
Dept: DERMATOLOGY | Facility: CLINIC | Age: 34
End: 2024-11-03
Payer: COMMERCIAL

## 2024-11-03 DIAGNOSIS — L63.9 AA (ALOPECIA AREATA): ICD-10-CM

## 2024-11-06 ENCOUNTER — MYC MEDICAL ADVICE (OUTPATIENT)
Dept: ENDOCRINOLOGY | Facility: CLINIC | Age: 34
End: 2024-11-06
Payer: COMMERCIAL

## 2024-11-06 DIAGNOSIS — E66.811 CLASS 1 OBESITY WITHOUT SERIOUS COMORBIDITY WITH BODY MASS INDEX (BMI) OF 32.0 TO 32.9 IN ADULT, UNSPECIFIED OBESITY TYPE: ICD-10-CM

## 2024-11-06 DIAGNOSIS — E88.819 INSULIN RESISTANCE: ICD-10-CM

## 2024-11-16 DIAGNOSIS — E88.819 INSULIN RESISTANCE: ICD-10-CM

## 2024-11-16 DIAGNOSIS — E66.811 CLASS 1 OBESITY WITHOUT SERIOUS COMORBIDITY WITH BODY MASS INDEX (BMI) OF 32.0 TO 32.9 IN ADULT, UNSPECIFIED OBESITY TYPE: ICD-10-CM

## 2024-11-18 RX ORDER — SEMAGLUTIDE 0.25 MG/.5ML
INJECTION, SOLUTION SUBCUTANEOUS
OUTPATIENT
Start: 2024-11-18

## 2024-12-01 DIAGNOSIS — E88.819 INSULIN RESISTANCE: ICD-10-CM

## 2024-12-01 DIAGNOSIS — R53.83 OTHER FATIGUE: ICD-10-CM

## 2024-12-02 RX ORDER — METFORMIN HYDROCHLORIDE 500 MG/1
1000 TABLET, EXTENDED RELEASE ORAL 2 TIMES DAILY WITH MEALS
Qty: 360 TABLET | Refills: 0 | Status: SHIPPED | OUTPATIENT
Start: 2024-12-02 | End: 2024-12-04

## 2024-12-02 NOTE — TELEPHONE ENCOUNTER
Last Written Prescription Date:  9/3/24  Last Fill Quantity: 90,  # refills: 0   Last office visit: 8/16/2023 ; last virtual visit: Visit date not found with prescribing provider:  Dr. Murphy   Future Office Visit:  12/4/24    Requested Prescriptions   Pending Prescriptions Disp Refills    metFORMIN (GLUCOPHAGE XR) 500 MG 24 hr tablet [Pharmacy Med Name: METFORMIN HCL ER TABS 500MG] 360 tablet 3     Sig: TAKE 2 TABLETS TWICE A DAY WITH MEALS       Biguanide Agents Failed - 12/2/2024  2:00 PM        Failed - Medication indicated for associated diagnosis     Medication is associated with one or more of the following diagnoses:     Gestational diabetes mellitus     Hyperinsulinar obesity     Hypersecretion of ovarian androgens    Non-alcoholic fatty liver    Polycystic ovarian syndrome               Pre-diabetes (DM 2 prevention)    Type 2 diabetes mellitus     Weight gain, antipsychotic therapy-induced    Impaired fasting glucose          Passed - Patient is age 10 or older        Passed - Patient does NOT have a diagnosis of CHF.        Passed - Medication is active on med list        Passed - Has GFR on file in past 12 months and most recent value is normal        Passed - Recent (6 mo) or future (90 days) visit within the authorizing provider's specialty     The patient must have completed an in-person or virtual visit within the past 6 months or has a future visit scheduled within the next 90 days with the authorizing provider s specialty.  Urgent care and e-visits do not quality as an office visit for this protocol.          Passed - Patient is not pregnant        Passed - Patient has not had a positive pregnancy test within the past 12 mos.            Refills sent  Ann Marie Crook RN

## 2024-12-04 ENCOUNTER — OFFICE VISIT (OUTPATIENT)
Dept: ENDOCRINOLOGY | Facility: CLINIC | Age: 34
End: 2024-12-04
Payer: COMMERCIAL

## 2024-12-04 VITALS — BODY MASS INDEX: 32.09 KG/M2 | WEIGHT: 198.8 LBS | SYSTOLIC BLOOD PRESSURE: 104 MMHG | DIASTOLIC BLOOD PRESSURE: 76 MMHG

## 2024-12-04 DIAGNOSIS — E66.811 CLASS 1 OBESITY WITHOUT SERIOUS COMORBIDITY WITH BODY MASS INDEX (BMI) OF 32.0 TO 32.9 IN ADULT, UNSPECIFIED OBESITY TYPE: Primary | ICD-10-CM

## 2024-12-04 NOTE — NURSING NOTE
Chief Complaint   Patient presents with    RECHECK     Other fatigue       Vitals:    12/04/24 0928   BP: 104/76   BP Location: Left arm   Patient Position: Sitting   Cuff Size: Adult Regular   Weight: 90.2 kg (198 lb 12.8 oz)       Body mass index is 32.09 kg/m .      Michael Mercedes MA

## 2024-12-04 NOTE — PROGRESS NOTES
Sarah A Riedell is a 34 year old yo female who presents today for follow-up of weight management. She has a personal history of alopecia areata.Last seen by me in August 2023.    Subjective:  Chief Complaint   Patient presents with    RECHECK     Other fatigue       1) Weight management  Weight gain-- baseline 150, recently 180 and difficult to lose it, mostly over the last two years, working with nutritionists/trainers since 2017. Working with her current one since Spring 2022, no weight loss but has noted change in body composition    INTERVAL HISTORY:  - Started Wegovy in October-- overall happy with this  - menstral cramps worsened  - Headaches day of/day after injection, alexis if not   - Inflammation is down, feels fingers are less swollen, less body aches, food noise down/gone  - Baseline weight- 202 lb- weight today 198lb        Wt Readings from Last 10 Encounters:   12/04/24 90.2 kg (198 lb 12.8 oz)   08/16/23 92 kg (202 lb 14.4 oz)   10/26/22 83.5 kg (184 lb)   10/02/15 71.4 kg (157 lb 6.4 oz)   12/04/14 71.8 kg (158 lb 6.4 oz)   06/12/14 71.7 kg (158 lb)   04/14/14 72.6 kg (160 lb)     Walks regularly with new cocker spaniel puppy, does occasional yoga.       2) Elevation of SHBG  See note from 8/2023 for full history  Most recent 6/2024- 50 (off supplemental estrogen)     Active diagnoses this visit:  Class 1 obesity without serious comorbidity with body mass index (BMI) of 32.0 to 32.9 in adult, unspecified obesity type     ROS: 10 point ROS neg other than the symptoms noted above in the HPI.      Medical, surgical, social, and family histories, medications and allergies reviewed and updated.  Past Medical History:   Diagnosis Date    Alopecia universalis     Fibroadenoma of breast        Past Surgical History:   Procedure Laterality Date    BREAST SURGERY      lump removed from left breast       Allergies:  No clinical screening - see comments    Social History     Tobacco Use    Smoking status: Never     Smokeless tobacco: Never   Substance Use Topics    Alcohol use: Yes     Alcohol/week: 0.0 standard drinks of alcohol     Comment: socially       Family History   Problem Relation Age of Onset    Asthma Mother     Hypertension Father     Cancer Paternal Grandmother         leukemia    Thyroid Disease Sister     Breast Cancer No family hx of     Melanoma No family hx of     Skin Cancer No family hx of      Objective:  /76 (BP Location: Left arm, Patient Position: Sitting, Cuff Size: Adult Regular)   Wt 90.2 kg (198 lb 12.8 oz)   BMI 32.09 kg/m      Physical Exam   CONSTITUTIONAL: healthy, alert and NAD, responding appropriately  ENT: normocephalic, no visual evidence of trauma, normal nose and oral mucosa  EYES: conjunctivae and sclerae normal, no exophthalmos or proptosis  THYROID:  no visualized nodules or goiter  LUNGS: no audible wheeze, cough or visible cyanosis, no visible retractions or increased work of breathing  EXTREMITIES: no hand tremors  NEUROLOGY: cranial nerves grossly intact with no obvious deficit.  SKIN:  no visualized skin lesions or rash, no edema visualized  PSYCH: mentation appears normal, normal judgement          Lab Results   Component Value Date/Time    TSH 1.48 06/17/2024 09:30 AM    TSH 2.35 10/26/2022 04:13 PM    TSH 1.92 04/23/2021 08:57 AM    T4 1.11 07/20/2023 10:11 AM    ESTROGEN <5 10/26/2022 04:13 PM     Last Comprehensive Metabolic Panel:  Sodium   Date Value Ref Range Status   06/17/2024 139 135 - 145 mmol/L Final     Comment:     Reference intervals for this test were updated on 09/26/2023 to more accurately reflect our healthy population. There may be differences in the flagging of prior results with similar values performed with this method. Interpretation of those prior results can be made in the context of the updated reference intervals.    01/24/2019 139 133 - 144 mmol/L Final     Potassium   Date Value Ref Range Status   06/17/2024 4.4 3.4 - 5.3 mmol/L Final    10/26/2022 3.8 3.4 - 5.3 mmol/L Final   01/24/2019 3.7 3.4 - 5.3 mmol/L Final     Chloride   Date Value Ref Range Status   06/17/2024 102 98 - 107 mmol/L Final   10/26/2022 106 94 - 109 mmol/L Final   01/24/2019 104 94 - 109 mmol/L Final     Carbon Dioxide   Date Value Ref Range Status   01/24/2019 28 20 - 32 mmol/L Final     Carbon Dioxide (CO2)   Date Value Ref Range Status   06/17/2024 26 22 - 29 mmol/L Final   10/26/2022 27 20 - 32 mmol/L Final     Anion Gap   Date Value Ref Range Status   06/17/2024 11 7 - 15 mmol/L Final   10/26/2022 5 3 - 14 mmol/L Final   01/24/2019 6 3 - 14 mmol/L Final     Glucose   Date Value Ref Range Status   06/17/2024 101 (H) 70 - 99 mg/dL Final   10/26/2022 81 70 - 99 mg/dL Final   01/24/2019 81 70 - 99 mg/dL Final     Urea Nitrogen   Date Value Ref Range Status   06/17/2024 11.2 6.0 - 20.0 mg/dL Final   10/26/2022 11 7 - 30 mg/dL Final   01/24/2019 16 7 - 30 mg/dL Final     Creatinine   Date Value Ref Range Status   06/17/2024 0.72 0.51 - 0.95 mg/dL Final   01/24/2019 0.95 0.52 - 1.04 mg/dL Final     GFR Estimate   Date Value Ref Range Status   06/17/2024 >90 >60 mL/min/1.73m2 Final     Comment:     eGFR calculated using 2021 CKD-EPI equation.   01/24/2019 81 >60 mL/min/[1.73_m2] Final     Comment:     Non  GFR Calc  Starting 12/18/2018, serum creatinine based estimated GFR (eGFR) will be   calculated using the Chronic Kidney Disease Epidemiology Collaboration   (CKD-EPI) equation.       Calcium   Date Value Ref Range Status   06/17/2024 9.7 8.6 - 10.0 mg/dL Final   01/24/2019 8.5 8.5 - 10.1 mg/dL Final           ASSESSMENT / PLAN:  (Z83.49) Family history of hypothyroidism  (L63.9) Alopecia areata    1) OBESITY-  Starting weight today  Body mass index is 32.09 kg/m .  1.   GOALS:  Target weight loss > 5% (10 lbs) over the next 3-4 months.  Long-term goal to maintain weight below  150 lbs  2.   DIET:  Portion controlled, carb-controlled diet, currently working with  nutritionist, wants to continue on this  3.   BEHAVIOR: start keeping a food record using phone radha or paper log, set goals, sleep hygiene  4.   MEDS: Continue Wegovy with titration, early response/success, plan for titration q28 days, ordered for this. Will let me know if develops side effects and wants slower titration.    - No personal hx of pancreatitis, no family history of medullary thyroid cancer   - Reviewed common side effects of nausea/bloating, slow food moving through stomach, cramping abdominal pain    5.   SURG:  Patient does not meet NIH criteria for WLS  6.   ACTIVITY: Encouraged regular physical activity throughout the day, not in bursts  7.   BARRIERS: Various medications for alopecia  8.   TEACHING:  Can return to see RD/CDE for further diet teaching as needed      Return to clinic: 6 months    A total of 30 minutes were spent today 12/04/24 on this visit including chart review, history and counseling, documentation and other activities as detailed above.

## 2024-12-04 NOTE — LETTER
12/4/2024      Sarah A Riedell  [address] [place] [address]      Dear Colleague,    Thank you for referring your patient, Sarah A Riedell, to the [facility] [place]. Please see a copy of my visit note below.    Sarah A Riedell is a 34 year old yo female who presents today for follow-up of weight management. She has a personal history of alopecia areata.Last seen by me in August 2023.    Subjective:  Chief Complaint   Patient presents with     RECHECK     Other fatigue       1) Weight management  Weight gain-- baseline 150, recently 180 and difficult to lose it, mostly over the last two years, working with nutritionists/trainers since 2017. Working with her current one since Spring 2022, no weight loss but has noted change in body composition    INTERVAL HISTORY:  - Started Wegovy in October-- overall happy with this  - menstral cramps worsened  - Headaches day of/day after injection, alexis if not   - Inflammation is down, feels fingers are less swollen, less body aches, food noise down/gone  - Baseline weight- 202 lb- weight today 198lb        Wt Readings from Last 10 Encounters:   12/04/24 90.2 kg (198 lb 12.8 oz)   08/16/23 92 kg (202 lb 14.4 oz)   10/26/22 83.5 kg (184 lb)   10/02/15 71.4 kg (157 lb 6.4 oz)   12/04/14 71.8 kg (158 lb 6.4 oz)   06/12/14 71.7 kg (158 lb)   04/14/14 72.6 kg (160 lb)     Walks regularly with new cocker spaniel puppy, does occasional yoga.       2) Elevation of SHBG  See note from 8/2023 for full history  Most recent 6/2024- 50 (off supplemental estrogen)     Active diagnoses this visit:  Class 1 obesity without serious comorbidity with body mass index (BMI) of 32.0 to 32.9 in adult, unspecified obesity type     ROS: 10 point ROS neg other than the symptoms noted above in the HPI.      Medical, surgical, social, and family histories, medications and allergies reviewed and updated.  Past Medical History:   Diagnosis Date     Alopecia universalis       Fibroadenoma of breast        Past Surgical History:   Procedure Laterality Date     BREAST SURGERY      lump removed from left breast       Allergies:  No clinical screening - see comments    Social History     Tobacco Use     Smoking status: Never     Smokeless tobacco: Never   Substance Use Topics     Alcohol use: Yes     Alcohol/week: 0.0 standard drinks of alcohol     Comment: socially       Family History   Problem Relation Age of Onset     Asthma Mother      Hypertension Father      Cancer Paternal Grandmother         leukemia     Thyroid Disease Sister      Breast Cancer No family hx of      Melanoma No family hx of      Skin Cancer No family hx of      Objective:  /76 (BP Location: Left arm, Patient Position: Sitting, Cuff Size: Adult Regular)   Wt 90.2 kg (198 lb 12.8 oz)   BMI 32.09 kg/m      Physical Exam   CONSTITUTIONAL: healthy, alert and NAD, responding appropriately  ENT: normocephalic, no visual evidence of trauma, normal nose and oral mucosa  EYES: conjunctivae and sclerae normal, no exophthalmos or proptosis  THYROID:  no visualized nodules or goiter  LUNGS: no audible wheeze, cough or visible cyanosis, no visible retractions or increased work of breathing  EXTREMITIES: no hand tremors  NEUROLOGY: cranial nerves grossly intact with no obvious deficit.  SKIN:  no visualized skin lesions or rash, no edema visualized  PSYCH: mentation appears normal, normal judgement          Lab Results   Component Value Date/Time    TSH 1.48 06/17/2024 09:30 AM    TSH 2.35 10/26/2022 04:13 PM    TSH 1.92 04/23/2021 08:57 AM    T4 1.11 07/20/2023 10:11 AM    ESTROGEN <5 10/26/2022 04:13 PM     Last Comprehensive Metabolic Panel:  Sodium   Date Value Ref Range Status   06/17/2024 139 135 - 145 mmol/L Final     Comment:     Reference intervals for this test were updated on 09/26/2023 to more accurately reflect our healthy population. There may be differences in the flagging of prior results with similar  values performed with this method. Interpretation of those prior results can be made in the context of the updated reference intervals.    01/24/2019 139 133 - 144 mmol/L Final     Potassium   Date Value Ref Range Status   06/17/2024 4.4 3.4 - 5.3 mmol/L Final   10/26/2022 3.8 3.4 - 5.3 mmol/L Final   01/24/2019 3.7 3.4 - 5.3 mmol/L Final     Chloride   Date Value Ref Range Status   06/17/2024 102 98 - 107 mmol/L Final   10/26/2022 106 94 - 109 mmol/L Final   01/24/2019 104 94 - 109 mmol/L Final     Carbon Dioxide   Date Value Ref Range Status   01/24/2019 28 20 - 32 mmol/L Final     Carbon Dioxide (CO2)   Date Value Ref Range Status   06/17/2024 26 22 - 29 mmol/L Final   10/26/2022 27 20 - 32 mmol/L Final     Anion Gap   Date Value Ref Range Status   06/17/2024 11 7 - 15 mmol/L Final   10/26/2022 5 3 - 14 mmol/L Final   01/24/2019 6 3 - 14 mmol/L Final     Glucose   Date Value Ref Range Status   06/17/2024 101 (H) 70 - 99 mg/dL Final   10/26/2022 81 70 - 99 mg/dL Final   01/24/2019 81 70 - 99 mg/dL Final     Urea Nitrogen   Date Value Ref Range Status   06/17/2024 11.2 6.0 - 20.0 mg/dL Final   10/26/2022 11 7 - 30 mg/dL Final   01/24/2019 16 7 - 30 mg/dL Final     Creatinine   Date Value Ref Range Status   06/17/2024 0.72 0.51 - 0.95 mg/dL Final   01/24/2019 0.95 0.52 - 1.04 mg/dL Final     GFR Estimate   Date Value Ref Range Status   06/17/2024 >90 >60 mL/min/1.73m2 Final     Comment:     eGFR calculated using 2021 CKD-EPI equation.   01/24/2019 81 >60 mL/min/[1.73_m2] Final     Comment:     Non  GFR Calc  Starting 12/18/2018, serum creatinine based estimated GFR (eGFR) will be   calculated using the Chronic Kidney Disease Epidemiology Collaboration   (CKD-EPI) equation.       Calcium   Date Value Ref Range Status   06/17/2024 9.7 8.6 - 10.0 mg/dL Final   01/24/2019 8.5 8.5 - 10.1 mg/dL Final           ASSESSMENT / PLAN:  (Z83.49) Family history of hypothyroidism  (L63.9) Alopecia areata    1)  OBESITY-  Starting weight today  Body mass index is 32.09 kg/m .  1.   GOALS:  Target weight loss > 5% (10 lbs) over the next 3-4 months.  Long-term goal to maintain weight below  150 lbs  2.   DIET:  Portion controlled, carb-controlled diet, currently working with nutritionist, wants to continue on this  3.   BEHAVIOR: start keeping a food record using phone radha or paper log, set goals, sleep hygiene  4.   MEDS: Continue Wegovy with titration, early response/success, plan for titration q28 days, ordered for this. Will let me know if develops side effects and wants slower titration.    - No personal hx of pancreatitis, no family history of medullary thyroid cancer   - Reviewed common side effects of nausea/bloating, slow food moving through stomach, cramping abdominal pain    5.   SURG:  Patient does not meet NIH criteria for WLS  6.   ACTIVITY: Encouraged regular physical activity throughout the day, not in bursts  7.   BARRIERS: Various medications for alopecia  8.   TEACHING:  Can return to see RD/CDE for further diet teaching as needed      Return to clinic: 6 months    A total of 30 minutes were spent today 12/04/24 on this visit including chart review, history and counseling, documentation and other activities as detailed above.       Again, thank you for allowing me to participate in the care of your patient.        Sincerely,        Hina Murphy MD

## 2024-12-17 ENCOUNTER — OFFICE VISIT (OUTPATIENT)
Dept: DERMATOLOGY | Facility: CLINIC | Age: 34
End: 2024-12-17
Attending: DERMATOLOGY
Payer: COMMERCIAL

## 2024-12-17 VITALS — DIASTOLIC BLOOD PRESSURE: 71 MMHG | SYSTOLIC BLOOD PRESSURE: 106 MMHG | HEART RATE: 72 BPM

## 2024-12-17 DIAGNOSIS — M35.9 AUTOIMMUNE DISEASE (H): ICD-10-CM

## 2024-12-17 DIAGNOSIS — L63.9 ALOPECIA AREATA: Primary | ICD-10-CM

## 2024-12-17 PROCEDURE — 99213 OFFICE O/P EST LOW 20 MIN: CPT | Mod: GC | Performed by: DERMATOLOGY

## 2024-12-17 ASSESSMENT — PAIN SCALES - GENERAL: PAINLEVEL_OUTOF10: NO PAIN (0)

## 2024-12-17 NOTE — PROGRESS NOTES
[facility] Dermatology Note  Encounter Date: Dec 17, 2024  Office Visit     Dermatology Problem List:  1. Alopecia areata (totalis), previously focal patchy disease that responded well to ILK but around April 2017 developed diffuse shedding in context of IUD placement (~Nov 2016). IUD now removed.  - Current rx: none. Planning to enroll in POOP study fall 2024.  - Recent prior: intermittent ILK; clobetasol cream under occlusion nightly 3x weekly, anti-dandruff shampoo, light therapy wand  - Distant prior rx: prednisone course May-August 2017 with cessation of acute flare, ketoconazole shampoo, topical minoxidil, and tacrolimus for eyebrows, clobetasol shampoo, fexofenadine, simvastatin/ezetimbe briefly, topical GAYATHRI inhibitor in trial at [place] for 2-3 months with no response.  - Tofacitinib approved 2/2019, but patient decided against starting treatment.   - Pending participation in GI fecal transplant/microbiome (POOP study); working with Dr. Weinberg.    - Labs 5/2017 with significant iron deficiency (ferritin 4), started on iron supplementation, repeat iron studies 6/2019 with ferritin 25. Repeat TSH 3/7/19 wnl.   - SHBG elevated, free testosterone low 12/2018; recheck 6/6/2019 wnl  2. Melanoma in mother  3. Insulin resistance  - current tx: 500 mg BID metformin    ____________________________________________    Assessment & Plan:   # Alopecia areata (totalis)  - Chronic, stable.  - No current medical management.  - A bit hesitant to start GAYATHRI inhibitors at this time given immunosuppression.  - Still interested in POOP study, reviewed IRB status and will probably be enrolling patients soon - will update her as more information becomes available.       Procedures Performed:   None      Follow-up: 8mo    Staff and Resident:     Resident:  I saw and discussed the patient with the attending physician, Dr. Edwards.       Ham Rosen MD, PhD  PGY-4 Dermatology Resident       Patient was  seen and examined with the dermatology resident. I agree with the history, review of systems, physical examination, assessments and plan.  Andreia Edwards MD  Professor   Department of Dermatology  [facility]         ____________________________________________    CC: Hair Loss (Alopecia areata: Stable since last seen/Follow-up on POOP study)      HPI:  Ms. Sarah A Riedell is a(n) 34 year old female who presents today for follow-up  for alopecia areata.    - No changes since last visit, not currently on any medical management.  - Still interested in POOP study.  - Wondering about any other non-immunosuppressive options or trials.   - No other changes to overall health.       Labs Reviewed:  N/A    Physical Exam:  Vitals: /71   Pulse 72   SKIN: Exam of scalp and face  - Total alopecia of the scalp, eyebrows, eyelash with a few short fine hairs scattered at the occiput and vertex.   - No other lesions of concern on areas examined.     Medications:  Current Outpatient Medications   Medication Sig Dispense Refill    NONFORMULARY One scalp prosthesis for alopecia areata 1 each 1    Semaglutide-Weight Management (WEGOVY) 1 MG/0.5ML pen Inject 1 mg subcutaneously once a week. 2 mL 0    [START ON 1/2/2025] Semaglutide-Weight Management (WEGOVY) 1.7 MG/0.75ML pen Inject 1.7 mg subcutaneously once a week. (Patient not taking: Reported on 12/17/2024) 3 mL 0    [START ON 1/23/2025] Semaglutide-Weight Management (WEGOVY) 2.4 MG/0.75ML pen Inject 2.4 mg subcutaneously once a week. (Patient not taking: Reported on 12/17/2024) 3 mL 5     No current facility-administered medications for this visit.        Past Medical History:   Patient Active Problem List   Diagnosis    CARDIOVASCULAR SCREENING; LDL GOAL LESS THAN 160    AA (alopecia areata)    Autoimmune disease (H)    Atrophy of skin    Onychodystrophy     Past Medical History:   Diagnosis Date    Alopecia universalis     Fibroadenoma of breast

## 2024-12-17 NOTE — LETTER
12/17/2024       RE: Sarah A Riedell  [address] [place] [address]     Dear Colleague,    Thank you for referring your patient, Sarah A Riedell, to the [facility] [place] [facility]. Please see a copy of my visit note below.    [facility] Dermatology Note  Encounter Date: Dec 17, 2024  Office Visit     Dermatology Problem List:  1. Alopecia areata (totalis), previously focal patchy disease that responded well to ILK but around April 2017 developed diffuse shedding in context of IUD placement (~Nov 2016). IUD now removed.  - Current rx: none. Planning to enroll in POOP study fall 2024.  - Recent prior: intermittent ILK; clobetasol cream under occlusion nightly 3x weekly, anti-dandruff shampoo, light therapy wand  - Distant prior rx: prednisone course May-August 2017 with cessation of acute flare, ketoconazole shampoo, topical minoxidil, and tacrolimus for eyebrows, clobetasol shampoo, fexofenadine, simvastatin/ezetimbe briefly, topical GAYATHRI inhibitor in trial at [place] for 2-3 months with no response.  - Tofacitinib approved 2/2019, but patient decided against starting treatment.   - Pending participation in GI fecal transplant/microbiome (POOP study); working with Dr. Weinberg.    - Labs 5/2017 with significant iron deficiency (ferritin 4), started on iron supplementation, repeat iron studies 6/2019 with ferritin 25. Repeat TSH 3/7/19 wnl.   - SHBG elevated, free testosterone low 12/2018; recheck 6/6/2019 wnl  2. Melanoma in mother  3. Insulin resistance  - current tx: 500 mg BID metformin    ____________________________________________    Assessment & Plan:   # Alopecia areata (totalis)  - Chronic, stable.  - No current medical management.  - A bit hesitant to start GAYATHRI inhibitors at this time given immunosuppression.  - Still interested in POOP study, reviewed IRB status and will probably be enrolling  patients soon - will update her as more information becomes available.       Procedures Performed:   None      Follow-up: 8mo    Staff and Resident:     Resident:  I saw and discussed the patient with the attending physician, Dr. Edwards.       Ham Rosen MD, PhD  PGY-4 Dermatology Resident       Patient was seen and examined with the dermatology resident. I agree with the history, review of systems, physical examination, assessments and plan.  Andreia Edwards MD  Professor   Department of Dermatology  [facility]         ____________________________________________    CC: Hair Loss (Alopecia areata: Stable since last seen/Follow-up on POOP study)      HPI:  Ms. Sarah A Riedell is a(n) 34 year old female who presents today for follow-up  for alopecia areata.    - No changes since last visit, not currently on any medical management.  - Still interested in POOP study.  - Wondering about any other non-immunosuppressive options or trials.   - No other changes to overall health.       Labs Reviewed:  N/A    Physical Exam:  Vitals: /71   Pulse 72   SKIN: Exam of scalp and face  - Total alopecia of the scalp, eyebrows, eyelash with a few short fine hairs scattered at the occiput and vertex.   - No other lesions of concern on areas examined.     Medications:  Current Outpatient Medications   Medication Sig Dispense Refill     NONFORMULARY One scalp prosthesis for alopecia areata 1 each 1     Semaglutide-Weight Management (WEGOVY) 1 MG/0.5ML pen Inject 1 mg subcutaneously once a week. 2 mL 0     [START ON 1/2/2025] Semaglutide-Weight Management (WEGOVY) 1.7 MG/0.75ML pen Inject 1.7 mg subcutaneously once a week. (Patient not taking: Reported on 12/17/2024) 3 mL 0     [START ON 1/23/2025] Semaglutide-Weight Management (WEGOVY) 2.4 MG/0.75ML pen Inject 2.4 mg subcutaneously once a week. (Patient not taking: Reported on 12/17/2024) 3 mL 5     No current facility-administered medications for this  visit.        Past Medical History:   Patient Active Problem List   Diagnosis     CARDIOVASCULAR SCREENING; LDL GOAL LESS THAN 160     AA (alopecia areata)     Autoimmune disease (H)     Atrophy of skin     Onychodystrophy     Past Medical History:   Diagnosis Date     Alopecia universalis      Fibroadenoma of breast        Again, thank you for allowing me to participate in the care of your patient.      Sincerely,    Andreia Edwards MD

## 2024-12-17 NOTE — NURSING NOTE
Dermatology Rooming Note    Sarah A Riedell's goals for this visit include:   Chief Complaint   Patient presents with    Hair Loss     Alopecia areata: Stable since last seen  Follow-up on POOP study     Tomeka Chang LPN

## 2025-02-10 ENCOUNTER — TELEPHONE (OUTPATIENT)
Dept: ENDOCRINOLOGY | Facility: CLINIC | Age: 35
End: 2025-02-10
Payer: COMMERCIAL

## 2025-02-10 DIAGNOSIS — E66.811 CLASS 1 OBESITY WITHOUT SERIOUS COMORBIDITY WITH BODY MASS INDEX (BMI) OF 32.0 TO 32.9 IN ADULT, UNSPECIFIED OBESITY TYPE: ICD-10-CM

## 2025-02-10 NOTE — TELEPHONE ENCOUNTER
[facility] Call Center    Phone Message    May a detailed message be left on voicemail: yes     Reason for Call: Medication Question or concern regarding medication   Prescription Clarification  Name of Medication:   Semaglutide-Weight Management (WEGOVY) 2.4 MG/0.75ML pen [[id number]]     Prescribing Provider: Rx'd by Katherine   Pharmacy: [id number] - [place], [place] - [address] AT [address] [place] [address]   What on the order needs clarification?    Would  like to discuss medication - is having stomach issues since increased dosage        Action Taken: Other: endo    Travel Screening: Not Applicable     Date of Service:

## 2025-02-10 NOTE — TELEPHONE ENCOUNTER
Called pt.  States she has had stomach issues since she took the 2.4 mg dose of wegovy the end of last wk.  She would like to go back down to the 1.7 mg.    Will route to provider. Ann Marie Crook RN

## 2025-04-24 ENCOUNTER — TELEPHONE (OUTPATIENT)
Dept: ENDOCRINOLOGY | Facility: CLINIC | Age: 35
End: 2025-04-24
Payer: COMMERCIAL

## 2025-04-24 NOTE — TELEPHONE ENCOUNTER
PA Initiation (Renewal) Key: [id number]    Medication: WEGOVY 1.7 MG/0.75ML SC SOAJ  Insurance Company: [employer] Non-Specialty PA's - Phone [phone] Fax [phone]  Pharmacy Filling the Rx: [id number] - [place], [place] - [address] AT [address] [place] [address] & [facility]  Filling Pharmacy Phone: [phone]  Filling Pharmacy Fax: [phone]  Start Date: 4/24/2025

## 2025-04-24 NOTE — TELEPHONE ENCOUNTER
Called pt and lm on self identified vm that we need an updated weight for PA renewal for wegovy.  Advised she could respond via mychart or phone call . Ann Marie Crook RN

## 2025-04-24 NOTE — TELEPHONE ENCOUNTER
----- Message from Marcial CHRIST Childers sent at 4/24/2025 10:47 AM CDT -----  Regarding: Prior Auth Renewal  Working on Prior Auth Renewal for patients Wegovy. I was looking and do not see an encounter from [facility] since 12/04/24 also this was the last weight. For renewal insurance asking: Has the patient lost greater than or equal to 5% of baseline body weight? Looking at chart notes:  Wt Readings from Last 10 Encounters:  12/04/24 90.2 kg (198 lb 12.8 oz)  08/16/23 92 kg (202 lb 14.4 oz)  10/26/22 83.5 kg (184 lb)  10/02/15 71.4 kg (157 lb 6.4 oz)  12/04/14 71.8 kg (158 lb 6.4 oz)  06/12/14 71.7 kg (158 lb)  04/14/14 72.6 kg (160 lb)      If someone could please get a updated weight for the renewal this would be appreciated. Thank you

## 2025-04-24 NOTE — TELEPHONE ENCOUNTER
Duplicate-see other tele enc from td. Ann Marie Crook RN     ED Provider Note    CHIEF COMPLAINT  Chief Complaint   Patient presents with   • Leg Pain     right leg worsening pain and swelling, hx blood clot to rle 2 months on eliquis   • Leg Swelling     with redness       HPI  Rudy Pérez is a 50 y.o. Male with history of provoked DVT secondary to orthopedic injury, on Eliquis, recently had orthopedic injury including avulsion fracture of his distal femur and a hamstring tear per patient while in physical therapy, he reports that he presented to the emergency room today because he was having worsening pain in his calf as well as worsening redness and swelling.  He is followed by physical therapy who saw him 2 days ago luis a line on his erythematous area to see if it was expanding, 48 hours later it has not.  Patient denies any fevers or constitutional symptoms.  She denies any decreased exertional capacity or shortness of breath.  Patient wears a knee immobilizer for symptoms.    REVIEW OF SYSTEMS  Review of Systems   Constitutional: Negative for chills and fever.   Respiratory: Negative for shortness of breath.    Cardiovascular: Negative for chest pain.   Gastrointestinal: Negative for nausea and vomiting.       See HPI for further details. All other systems are negative.     PAST MEDICAL HISTORY       SOCIAL HISTORY  Social History     Social History Main Topics   • Smoking status: Current Every Day Smoker     Packs/day: 1.00     Types: Cigarettes   • Smokeless tobacco: Former User   • Alcohol use No   • Drug use: Yes     Types: Inhaled      Comment: marijuana   • Sexual activity: Not on file       SURGICAL HISTORY  patient denies any surgical history    CURRENT MEDICATIONS  Home Medications     Reviewed by Jennifer Lima R.N. (Registered Nurse) on 06/06/19 at 1335  Med List Status: Partial   Medication Last Dose Status   apixaban (ELIQUIS) 5mg Tab 6/6/2019 Active   cyclobenzaprine (FLEXERIL) 10 MG Tab 6/5/2019 Active   gabapentin (NEURONTIN) 300 MG Cap 6/6/2019  Active   glipiZIDE SR (GLUCOTROL) 5 MG TABLET SR 24 HR 6/6/2019 Active   ibuprofen (MOTRIN) 600 MG Tab 6/6/2019 Active   insulin aspart (NOVOLOG) 100 UNIT/ML Solution  Active   insulin detemir (LEVEMIR) 100 UNIT/ML Solution  Active   lisinopril (PRINIVIL) 20 MG Tab 6/6/2019 Active   metformin (GLUCOPHAGE) 1000 MG tablet 6/6/2019 Active   morphine ER (MS CONTIN) 15 MG Tab CR tablet 5/31/2019 Active   oxyCODONE-acetaminophen (PERCOCET)  MG Tab 5/31/2019 Active   senna-docusate (PERICOLACE OR SENOKOT S) 8.6-50 MG Tab  Active                ALLERGIES  No Known Allergies    PHYSICAL EXAM  Physical Exam   Constitutional: He is oriented to person, place, and time. He appears well-developed and well-nourished.   HENT:   Head: Normocephalic and atraumatic.   Eyes: Pupils are equal, round, and reactive to light. Conjunctivae are normal.   Neck: Normal range of motion. Neck supple.   Cardiovascular: Normal rate and regular rhythm.  Exam reveals no gallop and no friction rub.    No murmur heard.  Pulmonary/Chest: Effort normal and breath sounds normal. No respiratory distress. He has no wheezes.   Abdominal: Soft. Bowel sounds are normal. He exhibits no distension. There is no tenderness. There is no rebound.   Musculoskeletal:   Right lower extremity with focal swelling of superior calf with overlying erythema and increased warmth.  Distal pulses are 2+, cap refill is instantaneous.  Patient with hypertrophy of right quadriceps muscles with some apparent bowling up of the muscle bilaterally which would be consistent with avulsion fractures.   Neurological: He is alert and oriented to person, place, and time.   Skin: Skin is warm and dry.   Psychiatric: He has a normal mood and affect. His behavior is normal.         DIAGNOSTIC STUDIES / PROCEDURES    EKG  See below    LABS  Results for orders placed or performed during the hospital encounter of 06/06/19   CBC WITH DIFFERENTIAL   Result Value Ref Range    WBC 10.4 4.8 -  10.8 K/uL    RBC 3.72 (L) 4.70 - 6.10 M/uL    Hemoglobin 10.3 (L) 14.0 - 18.0 g/dL    Hematocrit 34.0 (L) 42.0 - 52.0 %    MCV 91.4 81.4 - 97.8 fL    MCH 27.7 27.0 - 33.0 pg    MCHC 30.3 (L) 33.7 - 35.3 g/dL    RDW 51.8 (H) 35.9 - 50.0 fL    Platelet Count 426 164 - 446 K/uL    MPV 9.1 9.0 - 12.9 fL    Neutrophils-Polys 72.80 (H) 44.00 - 72.00 %    Lymphocytes 15.80 (L) 22.00 - 41.00 %    Monocytes 4.40 0.00 - 13.40 %    Eosinophils 6.20 0.00 - 6.90 %    Basophils 0.50 0.00 - 1.80 %    Immature Granulocytes 0.30 0.00 - 0.90 %    Nucleated RBC 0.00 /100 WBC    Neutrophils (Absolute) 7.58 (H) 1.82 - 7.42 K/uL    Lymphs (Absolute) 1.64 1.00 - 4.80 K/uL    Monos (Absolute) 0.46 0.00 - 0.85 K/uL    Eos (Absolute) 0.64 (H) 0.00 - 0.51 K/uL    Baso (Absolute) 0.05 0.00 - 0.12 K/uL    Immature Granulocytes (abs) 0.03 0.00 - 0.11 K/uL    NRBC (Absolute) 0.00 K/uL   LACTIC ACID   Result Value Ref Range    Lactic Acid 1.6 0.5 - 2.0 mmol/L   Comp Metabolic Panel   Result Value Ref Range    Sodium 136 135 - 145 mmol/L    Potassium 4.1 3.6 - 5.5 mmol/L    Chloride 104 96 - 112 mmol/L    Co2 22 20 - 33 mmol/L    Anion Gap 10.0 0.0 - 11.9    Glucose 210 (H) 65 - 99 mg/dL    Bun 17 8 - 22 mg/dL    Creatinine 0.81 0.50 - 1.40 mg/dL    Calcium 9.1 8.5 - 10.5 mg/dL    AST(SGOT) 9 (L) 12 - 45 U/L    ALT(SGPT) 8 2 - 50 U/L    Alkaline Phosphatase 75 30 - 99 U/L    Total Bilirubin 0.3 0.1 - 1.5 mg/dL    Albumin 3.6 3.2 - 4.9 g/dL    Total Protein 7.4 6.0 - 8.2 g/dL    Globulin 3.8 (H) 1.9 - 3.5 g/dL    A-G Ratio 0.9 g/dL   ESTIMATED GFR   Result Value Ref Range    GFR If African American >60 >60 mL/min/1.73 m 2    GFR If Non African American >60 >60 mL/min/1.73 m 2   EKG   Result Value Ref Range    Report       [facility] Emergency Dept.    Test Date:  2019-06-06  Pt Name:    BINA PATEL                  Department: ER  MRN:        [id number]                      Room:       [facility]  Gender:     Male                          Technician: [id number]  :        1968                   Requested By:ER TRIAGE PROTOCOL  Order #:    [id number]                    Reading MD: Simón Elizabeth MD    Measurements  Intervals                                Axis  Rate:       116                          P:          57  KS:         180                          QRS:        35  QRSD:       80                           T:          59  QT:         324  QTc:        451    Interpretive Statements  Sinus tachycardia, occasional PVC, normal axis normal intervals no ST changes    consistent with acute regional ischemia    Electronically Signed On 2019 14:26:15 PDT by Simón Elizabeth MD           RADIOLOGY  US-EXTREMITY VENOUS LOWER UNILAT RIGHT   Final Result      CT-EXTREMITY, LOWER WITH RIGHT   Final Result      1.  A 15.9 cm abscess in the posterior upper calf as described.   2.  Associated moderate knee effusion with synovial hyperenhancement, consistent with septic arthritis.   3.  No cortical erosion/destruction to suggest osteomyelitis.   4.  No acute fracture or dislocation.      DX-FEMUR-2+ RIGHT   Final Result      Mild right hip osteoarthrosis. No acute fracture or dislocation.              COURSE & MEDICAL DECISION MAKING  Pertinent Labs & Imaging studies reviewed. (See chart for details)  Patient here with concern for possible recurrent DVT versus cellulitis.  Will also x-ray patient's like to see if the orthopedic injury is healing correctly, and concerned that he has not seen orthopedic surgeon as he does appear to have ongoing avulsions of his quadricep tendons, he will need orthopedic follow-up.  Also basic labs as patient is mildly tachycardic, this may be developing sepsis therefore observe lactate and blood cultures as well.  Patient without any chest pain or shortness of breath or decreased exertional capacity or cardiopulmonary symptoms otherwise to suggest migration of clot or cardiovascular etiology otherwise.  Patient ultrasound  reveals a heterogenous mass which appears most consistent with abscess, it is deep.  Will check CT for further delineation.  I discussed case with orthopedics will surgically manage tomorrow, patient placed on IV antibiotics in the interim, I discussed the case with hospitalist who has accepted case and will assume care.  CT reveals questionable involvement of patient's knee joint, he does have full range of motion of his knee, this result was collected after patient was sent to the floor I therefore re-paged orthopedics to disclose this information.  The patient will not drink alcohol nor drive with prescribed medications. The patient will return for worsening symptoms and is stable at the time of discharge. The patient verbalizes understanding and will comply.    FINAL IMPRESSION  1.  septic arthritis, deep calf abscess      Electronically signed by: Glenn Gr, 6/6/2019 2:21 PM

## 2025-04-24 NOTE — TELEPHONE ENCOUNTER
Called pt and her current weight is 197.  She has been on 1.7 mg dose for a couple of months, and still has a couple of 2.4 mg pens from when she was on that dose (stopped because of GI effects). States she will try the 2.4 mg dose to see how she tolerates it, and report back in a week or so.    Ann Marie Crook RN

## 2025-04-25 NOTE — TELEPHONE ENCOUNTER
PRIOR AUTHORIZATION DENIED    Medication: WEGOVY 1.7 MG/0.75ML SC SOAJ  Insurance Company: [employer] Non-Specialty PA's - Phone [phone] Fax [phone]  Denial Date: 4/25/2025  Denial Reason(s):                     Appeal Information:                 Patient Notified: Clinic to Review & discus next steps

## 2025-05-05 ENCOUNTER — TELEPHONE (OUTPATIENT)
Dept: ENDOCRINOLOGY | Facility: CLINIC | Age: 35
End: 2025-05-05
Payer: COMMERCIAL

## 2025-05-05 DIAGNOSIS — E66.811 CLASS 1 OBESITY WITHOUT SERIOUS COMORBIDITY WITH BODY MASS INDEX (BMI) OF 32.0 TO 32.9 IN ADULT, UNSPECIFIED OBESITY TYPE: Primary | ICD-10-CM

## 2025-05-05 DIAGNOSIS — E88.819 INSULIN RESISTANCE: ICD-10-CM

## 2025-05-05 NOTE — TELEPHONE ENCOUNTER
PA Initiation - Key: [id number]    Medication: ZEPBOUND 7.5 MG/0.5ML SC SOAJ  Insurance Company: [employer] Non-Specialty PA's - Phone [phone] Fax [phone]  Pharmacy [facility]illing [facility] Rx: [id number] - [place], [place] - [address] AT [address] [place] [address] & [facility]  Filling Pharmacy Phone: [phone]  Filling Pharmacy Fax: [phone]  Start Date: 5/5/2025

## 2025-05-05 NOTE — TELEPHONE ENCOUNTER
Prior Authorization Approval    Medication: ZEPBOUND 7.5 MG/0.5ML SC SOAJ  Authorization Effective Date: 4/5/2025  Authorization Expiration Date: 12/31/2025  Approved Dose/Quantity:    Reference #: Key: [id number]   Insurance Company: [employer] Non-Specialty PA's - Phone [phone] Fax [phone]  Expected CoPay: $    CoPay Card Available: No    Financial Assistance Needed:    Which Pharmacy is filling the prescription: [id number] - [place], [place] - [address]  Pharmacy Notified:  Y  Patient Notified: OCLLIN

## 2025-06-02 ENCOUNTER — OFFICE VISIT (OUTPATIENT)
Dept: DERMATOLOGY | Facility: CLINIC | Age: 35
End: 2025-06-02
Payer: COMMERCIAL

## 2025-06-02 ENCOUNTER — OFFICE VISIT (OUTPATIENT)
Dept: PHARMACY | Facility: CLINIC | Age: 35
End: 2025-06-02
Attending: DERMATOLOGY

## 2025-06-02 DIAGNOSIS — M35.9 AUTOIMMUNE DISEASE: Primary | ICD-10-CM

## 2025-06-02 DIAGNOSIS — L63.9 ALOPECIA AREATA: ICD-10-CM

## 2025-06-02 DIAGNOSIS — Z76.89 ENCOUNTER FOR WEIGHT MANAGEMENT: ICD-10-CM

## 2025-06-02 DIAGNOSIS — Z51.81 MEDICATION MONITORING ENCOUNTER: ICD-10-CM

## 2025-06-02 DIAGNOSIS — L63.9 AA (ALOPECIA AREATA): Primary | ICD-10-CM

## 2025-06-02 DIAGNOSIS — R79.89 LOW VITAMIN D LEVEL: ICD-10-CM

## 2025-06-02 DIAGNOSIS — R79.0 LOW IRON STORES: ICD-10-CM

## 2025-06-02 PROCEDURE — 99215 OFFICE O/P EST HI 40 MIN: CPT | Performed by: PHARMACIST

## 2025-06-02 PROCEDURE — 99214 OFFICE O/P EST MOD 30 MIN: CPT | Performed by: DERMATOLOGY

## 2025-06-02 PROCEDURE — 3049F LDL-C 100-129 MG/DL: CPT | Performed by: DERMATOLOGY

## 2025-06-02 PROCEDURE — 1126F AMNT PAIN NOTED NONE PRSNT: CPT | Performed by: DERMATOLOGY

## 2025-06-02 RX ORDER — PREDNISOLONE ACETATE 10 MG/ML
1 SUSPENSION/ DROPS OPHTHALMIC DAILY
COMMUNITY
Start: 2025-04-25

## 2025-06-02 ASSESSMENT — PAIN SCALES - GENERAL: PAINLEVEL_OUTOF10: NO PAIN (0)

## 2025-06-02 NOTE — NURSING NOTE
Dermatology Rooming Note    Sarah A Riedell's goals for this visit include:   Chief Complaint   Patient presents with    Hair Loss     HL follow up; Lucrecia reports that HL has been about the same.     RICHIE Ellis

## 2025-06-02 NOTE — LETTER
6/2/2025       RE: Sarah A Riedell  [address] [place] [address]     Dear Colleague,    Thank you for referring your patient, Sarah A Riedell, to the [facility] [place] at [facility]. Please see a copy of my visit note below.    [facility] Dermatology Note  Encounter Date: Jun 2, 2025  Office Visit     Dermatology Problem List:  1.  Alopecia areata (totalis), previously focal patchy disease that responded well to ILK but around April 2017 developed diffuse shedding in context of IUD placement (~Nov 2016). IUD now removed.  - Current rx: plan to start GAYATHRI inhibitor, minoxidil 5% for eyebrows  - Prior rx:  intermittent ILK; clobetasol cream under occlusion nightly 3x weekly, anti-dandruff shampoo, light therapy wand, prednisone course May-August 2017 with cessation of acute flare, ketoconazole shampoo, topical minoxidil, and tacrolimus for eyebrows, clobetasol shampoo, fexofenadine, simvastatin/ezetimbe briefly, topical GAYATHRI inhibitor in trial at [place] for 2-3 months with no response.  - Labs 6/2/25 CBC, CMP, lipids, iron/iron binding capacity, ferritin, vitamin D, HCG, hepatitis screen, HIV screen, quant gold; history of low vitamin D 6/2024  - Labs 5/2017 with significant iron deficiency (ferritin 4), started on iron supplementation, repeat iron studies 6/2019 with ferritin 25. Repeat TSH 3/7/19 wnl.   - SHBG elevated, free testosterone low 12/2018; recheck 6/6/2019 wnl  2. Melanoma in mother  3. Insulin resistance  - current tx: Zepbound (started May 2025, previously Wegovy since November 2024)  ____________________________    Assessment & Plan:   # Alopecia areata (totalis)  Chronic and stable. Had not been medically managing, but interested in starting GAYATHRI inhibitor. Will get baseline labs today and consult with pharmacy regarding best options.  - Labs today: CBC, CMP, lipids, iron/iron binding  capacity, ferritin, vitamin D, HCG, hepatitis screen, HIV screen, quant gold  - Plan to initiate GAYATHRI inhibitor pending lab results.  - For eyebrows, can use topical minoxidil 5% daily.    Procedures Performed:   None      Follow-up: phone visit in next 2-3 weeks to discuss lab results and initiate GAYATHRI inhibitor    Staff and Medical Student:  I, Lucrecia Stock, MS4, saw and evaluated the patient with Dr. Edwards.    Staff Physician:  I was present with the medical student who participated in the service and in the documentation of the note. I have verified the history and personally performed the physical exam and medical decision making. I agree with the assessment and plan of care as documented in the note.           Andreia Edwards MD  Professor  Department of Dermatology  [facility]: Phone: [phone], Fax:[phone]  [facility]: Phone: [phone], Fax: [phone]      ____________________________________________    CC: Hair Loss (HL follow up; Lucrecia reports that HL has been about the same.)    HPI:  Ms. Sarah A Riedell is a 34 year old female who presents as a return patient for follow-up of hair loss, diagnosed as alopecia areata totalis. She was last seen on 12/17/2024, when her disease was stable and she continued no treatments. Today she reports she has been stable, with some hairs showing up on the back of her head and eyebrows intermittently. She was recommended to start vitamin D supplementation and has been trying to do so, albeit inconsistently. She is on Zepbound as of last month for weight loss, previously was on Wegovy since November 2024 without weight change.    Today she would like to discuss starting a GAYATHRI inhibitor. She has done some research on litfulo, but is interested in whatever option makes the most sense for her situation. She has been healthy since her last visit and has  no other concerns.      - Shedding or thinning, or both: N/A  - Current tx: none  yes Any new medications, supplements, or products? (please list below)   Zepbound started May 2025 (previously Wegovy since November 2024)  no Scalp pain   no Scalp burning   no Scalp itching    no Eyebrow changes    no Eyelash changes   no Beard changes    no Other body hair changes    Yes - minor pitting Nail changes    no Additional symptoms? (please list below)     - Overall course: stable  - COVID status: negative    Patient is otherwise feeling well, in usual state of health, and has no additional skin concerns today.       Labs:  CBC , Iron studies , and Vitamin D reviewed.    Physical Exam:  Vitals: There were no vitals taken for this visit.  GEN: Well developed, well-nourished, in no acute distress, in a pleasant mood.    SKIN: Focused examination of scalp, face, and hands/nails was performed.  - vellus fibers on occipital scalp, total alopecia of remainder of scalp  - Scattered lightly pigmented fibers on bilateral eyebrows  - Minimal 1 mm eyelashes bilaterally  - Mild pitting of fingernails   - SALT:   - Occipital Scalp: 0.228   - Frontal Scalp: 0.24   - Right Temporal Scalp: 0.24  - Left Temporal Scalp: 0.24  - No other lesions of concern on areas examined.         Medications:  Current Outpatient Medications   Medication Sig Dispense Refill     prednisoLONE acetate (PRED FORTE) 1 % ophthalmic suspension Place 1 drop into both eyes daily.       tirzepatide-Weight Management (ZEPBOUND) 7.5 MG/0.5ML prefilled pen Inject 0.5 mLs (7.5 mg) subcutaneously every 7 days. 2 mL 3     NONFORMULARY One scalp prosthesis for alopecia areata (Patient not taking: Reported on 6/2/2025) 1 each 1     Semaglutide-Weight Management (WEGOVY) 1 MG/0.5ML pen Inject 1 mg subcutaneously once a week. (Patient not taking: Reported on 6/2/2025) 2 mL 0     Semaglutide-Weight Management (WEGOVY) 1.7 MG/0.75ML pen Inject 1.7 mg subcutaneously once a week.  (Patient not taking: Reported on 6/2/2025) 3 mL 11     Semaglutide-Weight Management (WEGOVY) 2.4 MG/0.75ML pen Inject 2.4 mg subcutaneously once a week. (Patient not taking: Reported on 6/2/2025) 3 mL 5     No current facility-administered medications for this visit.      Past Medical History:   Patient Active Problem List   Diagnosis     CARDIOVASCULAR SCREENING; LDL GOAL LESS THAN 160     AA (alopecia areata)     Autoimmune disease     Atrophy of skin     Onychodystrophy     Past Medical History:   Diagnosis Date     Alopecia universalis      Fibroadenoma of breast        CC Referred Self, MD  No address on file on close of this encounter.     Again, thank you for allowing me to participate in the care of your patient.      Sincerely,    Andreia Edwards MD

## 2025-06-02 NOTE — PROGRESS NOTES
Medication Therapy Management (MTM) Encounter    ASSESSMENT:                            Alopecia areata (totalis): Today patient is interested in starting a Andrea and Dr. Edwards is agreeable -- we discussed both Litfulo and Olumiant are both great options, but deccided to start with Olumiant since Lucrecia has found more studies to support it's use. However, if her insurance prefers Litfulo, she is ok with switching to that instead. We reviewed the insurance and specialty pharmacy process, and I showed her how to obtain a copay card. No current drug interactions. We did discuss immunsuppression today. Will wait to place orders until her prebiologic labs are done, but I did review her labs from 2017/2019. Immunizations were also discussed, no current vaccines needed at this time.     Weight Management: Continue current regimen and following with endo.     PLAN:                            Olumiant 4 mg once daily order placed pending prebiologic labs    [facility]    SUBJECTIVE/OBJECTIVE:                          Sarah Riedell is a 34 year old female seen for an initial visit. She was referred to me from Dr. Edwards. Patient saw provider prior to our visit today.    Reason for visit: GAYATHRI inhibitor initiation for AA.  Medication Adherence/Access: No concerns, she was familiar with her medications.     Alopecia areata (totalis):   Patient is today interested in a Andrea -- she has done research on Olumiant and Litfulo, and saw more studies for Olumiant. She is open to eithe roption. In addition, Dr. Edwards is open to either option.   She did call her insurance, and believes the specialty pharmacy preferred is [facility]. She was not told that there was a preferred Andrea.   Not using any topicals at this time.   She wonders about drug interactions.   Per chart review, no plans for pregnancy  Prebiologic labs ordered today -- per Dr. Edwards, wait to start until labs return.     HPI:    previously focal patchy disease that responded well to ILK but around April 2017 developed diffuse shedding in context of IUD placement (~Nov 2016). IUD now removed.  Prior rx:  intermittent ILK; clobetasol cream under occlusion nightly 3x weekly, anti-dandruff shampoo, light therapy wand, prednisone course May-August 2017 with cessation of acute flare, ketoconazole shampoo, topical minoxidil, and tacrolimus for eyebrows, clobetasol shampoo, fexofenadine, simvastatin/ezetimbe briefly, topical GAYATHRI inhibitor in trial at [place] for 2-3 months with no response.    Pre-Biologic Screening  -- Hep B Surface Antibody reactive on 1/24/19  -- Hep B Surface Antigen non-reactive on 1/24/19  -- Hep B Core Antibody non-reactive on 5/26/17  -- Hep C Antibody non-reactive on 1/24/19  -- HIV Antigen/Antibody non-reactive on 1/24/19  -- QuantiFERON-Tb: negative on 1/24/19    Vaccinations:  All patients on biologics should avoid live vaccines (varicella/VZV, intranasal influenza, MMR, or yellow fever vaccine (if traveling))    -- Influenza (every year): last 10/11/24  -- TdaP (every 10 years): 7/18/15  -- Pneumococcal Pneumonia: n/a    Prevnar-13:   Pneumovax-23:   Prevnar-20:  -- COVID-19: last 3/21/24  -- RSV: n/a  -- Varicella/Zoster - reports hx chicken pox   Varicella:   Zoster:    Weight Management:   - Zepbound 7.5 mg weekly   Patient is working with [employer] closely. She was switched from Wegovy to Zepbound last month.  Wt Readings from Last 3 Encounters:   12/04/24 90.2 kg (198 lb 12.8 oz)   08/16/23 92 kg (202 lb 14.4 oz)   10/26/22 83.5 kg (184 lb)     Lab Results   Component Value Date    A1C 5.3 08/24/2023       Today's Vitals: There were no vitals taken for this visit.    Allergies/ADRs: Reviewed in chart  Past Medical History: Reviewed in chart  Tobacco: She reports that she has never smoked. She has never used smokeless tobacco.  Alcohol: reviewed in chart    ----------------      I spent 15 minutes with this patient  today. All changes were made via collaborative practice agreement with Andreia Edwards.     A summary of these recommendations was declined by the patient.    Anamika Calzada, Pharm.D., [facility]   Medication Therapy Management Pharmacist   [facility]         Medication Therapy Recommendations  No medication therapy recommendations to display

## 2025-06-04 ENCOUNTER — LAB (OUTPATIENT)
Dept: LAB | Facility: CLINIC | Age: 35
End: 2025-06-04
Payer: COMMERCIAL

## 2025-06-04 DIAGNOSIS — M35.9 AUTOIMMUNE DISEASE: ICD-10-CM

## 2025-06-04 DIAGNOSIS — R79.0 LOW IRON STORES: ICD-10-CM

## 2025-06-04 DIAGNOSIS — L63.9 ALOPECIA AREATA: ICD-10-CM

## 2025-06-04 DIAGNOSIS — R79.89 LOW VITAMIN D LEVEL: ICD-10-CM

## 2025-06-04 DIAGNOSIS — Z51.81 MEDICATION MONITORING ENCOUNTER: ICD-10-CM

## 2025-06-04 LAB
ALBUMIN SERPL BCG-MCNC: 4.5 G/DL (ref 3.5–5.2)
ALP SERPL-CCNC: 62 U/L (ref 40–150)
ALT SERPL W P-5'-P-CCNC: 21 U/L (ref 0–50)
ANION GAP SERPL CALCULATED.3IONS-SCNC: 12 MMOL/L (ref 7–15)
AST SERPL W P-5'-P-CCNC: 27 U/L (ref 0–45)
BASOPHILS # BLD AUTO: 0.1 10E3/UL (ref 0–0.2)
BASOPHILS NFR BLD AUTO: 1 %
BILIRUB SERPL-MCNC: 0.4 MG/DL
BUN SERPL-MCNC: 8.8 MG/DL (ref 6–20)
CALCIUM SERPL-MCNC: 9.2 MG/DL (ref 8.8–10.4)
CHLORIDE SERPL-SCNC: 104 MMOL/L (ref 98–107)
CHOLEST SERPL-MCNC: 182 MG/DL
CREAT SERPL-MCNC: 0.79 MG/DL (ref 0.51–0.95)
EGFRCR SERPLBLD CKD-EPI 2021: >90 ML/MIN/1.73M2
EOSINOPHIL # BLD AUTO: 0.2 10E3/UL (ref 0–0.7)
EOSINOPHIL NFR BLD AUTO: 3 %
ERYTHROCYTE [DISTWIDTH] IN BLOOD BY AUTOMATED COUNT: 11.9 % (ref 10–15)
FASTING STATUS PATIENT QL REPORTED: YES
FASTING STATUS PATIENT QL REPORTED: YES
FERRITIN SERPL-MCNC: 49 NG/ML (ref 6–175)
GLUCOSE SERPL-MCNC: 92 MG/DL (ref 70–99)
HBV CORE AB SERPL QL IA: NONREACTIVE
HBV SURFACE AB SERPL IA-ACNC: 7.83 M[IU]/ML
HBV SURFACE AB SERPL IA-ACNC: NONREACTIVE M[IU]/ML
HBV SURFACE AG SERPL QL IA: NONREACTIVE
HCG INTACT+B SERPL-ACNC: <1 MIU/ML
HCO3 SERPL-SCNC: 27 MMOL/L (ref 22–29)
HCT VFR BLD AUTO: 42.3 % (ref 35–47)
HCV AB SERPL QL IA: NONREACTIVE
HDLC SERPL-MCNC: 40 MG/DL
HGB BLD-MCNC: 14.2 G/DL (ref 11.7–15.7)
HIV 1+2 AB+HIV1 P24 AG SERPL QL IA: NONREACTIVE
IMM GRANULOCYTES # BLD: 0 10E3/UL
IMM GRANULOCYTES NFR BLD: 0 %
IRON BINDING CAPACITY (ROCHE): 273 UG/DL (ref 240–430)
IRON SATN MFR SERPL: 40 % (ref 15–46)
IRON SERPL-MCNC: 108 UG/DL (ref 37–145)
LDLC SERPL CALC-MCNC: 125 MG/DL
LYMPHOCYTES # BLD AUTO: 2 10E3/UL (ref 0.8–5.3)
LYMPHOCYTES NFR BLD AUTO: 38 %
MCH RBC QN AUTO: 28.9 PG (ref 26.5–33)
MCHC RBC AUTO-ENTMCNC: 33.6 G/DL (ref 31.5–36.5)
MCV RBC AUTO: 86 FL (ref 78–100)
MONOCYTES # BLD AUTO: 0.4 10E3/UL (ref 0–1.3)
MONOCYTES NFR BLD AUTO: 8 %
NEUTROPHILS # BLD AUTO: 2.7 10E3/UL (ref 1.6–8.3)
NEUTROPHILS NFR BLD AUTO: 51 %
NONHDLC SERPL-MCNC: 142 MG/DL
PLATELET # BLD AUTO: 366 10E3/UL (ref 150–450)
POTASSIUM SERPL-SCNC: 4.4 MMOL/L (ref 3.4–5.3)
PROT SERPL-MCNC: 7.2 G/DL (ref 6.4–8.3)
RBC # BLD AUTO: 4.92 10E6/UL (ref 3.8–5.2)
SODIUM SERPL-SCNC: 143 MMOL/L (ref 135–145)
TRIGL SERPL-MCNC: 85 MG/DL
VIT D+METAB SERPL-MCNC: 33 NG/ML (ref 20–50)
WBC # BLD AUTO: 5.4 10E3/UL (ref 4–11)

## 2025-06-04 PROCEDURE — 85025 COMPLETE CBC W/AUTO DIFF WBC: CPT

## 2025-06-04 PROCEDURE — 86706 HEP B SURFACE ANTIBODY: CPT

## 2025-06-04 PROCEDURE — 87340 HEPATITIS B SURFACE AG IA: CPT

## 2025-06-04 PROCEDURE — 87389 HIV-1 AG W/HIV-1&-2 AB AG IA: CPT

## 2025-06-04 PROCEDURE — 83540 ASSAY OF IRON: CPT | Performed by: DERMATOLOGY

## 2025-06-04 PROCEDURE — 84702 CHORIONIC GONADOTROPIN TEST: CPT | Performed by: DERMATOLOGY

## 2025-06-04 PROCEDURE — 36415 COLL VENOUS BLD VENIPUNCTURE: CPT

## 2025-06-04 PROCEDURE — 86704 HEP B CORE ANTIBODY TOTAL: CPT

## 2025-06-04 PROCEDURE — 99000 SPECIMEN HANDLING OFFICE-LAB: CPT | Performed by: PATHOLOGY

## 2025-06-04 PROCEDURE — 80061 LIPID PANEL: CPT | Performed by: DERMATOLOGY

## 2025-06-04 PROCEDURE — 82306 VITAMIN D 25 HYDROXY: CPT | Performed by: DERMATOLOGY

## 2025-06-04 PROCEDURE — 80053 COMPREHEN METABOLIC PANEL: CPT | Performed by: DERMATOLOGY

## 2025-06-04 PROCEDURE — 86803 HEPATITIS C AB TEST: CPT

## 2025-06-04 PROCEDURE — 83550 IRON BINDING TEST: CPT | Performed by: DERMATOLOGY

## 2025-06-04 PROCEDURE — 82728 ASSAY OF FERRITIN: CPT | Performed by: DERMATOLOGY

## 2025-06-05 LAB
QUANTIFERON MITOGEN: 10 IU/ML
QUANTIFERON NIL TUBE: 0.09 IU/ML
QUANTIFERON TB1 TUBE: 0.23 IU/ML
QUANTIFERON TB2 TUBE: 0.2

## 2025-06-07 ENCOUNTER — RESULTS FOLLOW-UP (OUTPATIENT)
Dept: DERMATOLOGY | Facility: CLINIC | Age: 35
End: 2025-06-07

## 2025-06-16 ENCOUNTER — VIRTUAL VISIT (OUTPATIENT)
Dept: DERMATOLOGY | Facility: CLINIC | Age: 35
End: 2025-06-16
Payer: COMMERCIAL

## 2025-06-16 DIAGNOSIS — L63.9 AA (ALOPECIA AREATA): Primary | ICD-10-CM

## 2025-06-16 DIAGNOSIS — M35.9 AUTOIMMUNE DISEASE: ICD-10-CM

## 2025-06-16 ASSESSMENT — PAIN SCALES - GENERAL: PAINLEVEL_OUTOF10: NO PAIN (0)

## 2025-06-16 NOTE — LETTER
6/16/2025       RE: Sarah A Riedell  [address] [place] [address]     Dear Colleague,    Thank you for referring your patient, Sarah A Riedell, to the [facility] [place] at [facility]. Please see a copy of my visit note below.    [facility] Dermatology Note  Encounter Date: Jun 16, 2025  Telephone ([phone]). Location of teledermatologist: [facility]/Department of Dermatology  Start time: 4:59. End time: :5:08 -  actual conversation    Dermatology Problem List:  1.  Alopecia areata (totalis), previously focal patchy disease that responded well to ILK but around April 2017 developed diffuse shedding in context of IUD placement (~Nov 2016). IUD now removed.  - Current rx: here today to start a GAYATHRI inhibitor, minoxidil 5% for eyebrows  - Prior rx:  intermittent ILK; clobetasol cream under occlusion nightly 3x weekly, anti-dandruff shampoo, light therapy wand, prednisone course May-August 2017 with cessation of acute flare, ketoconazole shampoo, topical minoxidil, and tacrolimus for eyebrows, clobetasol shampoo, fexofenadine, simvastatin/ezetimbe briefly, topical GAYATHRI inhibitor in trial at [place] for 2-3 months with no response.  - Labs 6/2/25 CBC, CMP, lipids, iron/iron binding capacity, ferritin, vitamin D, HCG, hepatitis screen, HIV screen, quant gold;  - reviewed and all good except for lipid profile with slightly low HDL  history of low vitamin D 6/2024  - Labs 5/2017 with significant iron deficiency (ferritin 4), started on iron supplementation, repeat iron studies 6/2019 with ferritin 25. Repeat TSH 3/7/19 wnl.   - SHBG elevated, free testosterone low 12/2018; recheck 6/6/2019 wnl  2. Melanoma in mother  3. Insulin resistance  - current tx: Zepbound (started May 2025, previously Wegovy since November 2024)  ____________________________     Assessment & Plan:   # Alopecia areata  (totalis)  Chronic and stable. Had not been medically managing, but interested in starting GAYATHRI inhibitor. Baseline labs were obtained and here today to review results and finalize therapy plans.  - Initiate baricitinib 4 mg daily - follow-up safety labs and visit about 1 month after starting treatment and again at about 6 months after starting treatment  - For eyebrows, can use topical minoxidil 5% daily.    #Abnormal lipid profile with slightly low HDL. Lucrecia will review these results with her endocrinologist this coming week.     ____________________________________________      Procedures Performed:    None    Follow-up: 1 month and 6 months after starting baricitinib    Staff:     Andreia Edwards MD  Professor   Department of Dermatology  [facility]   ____________________________________________    CC: Hair Loss (Review lab results and discuss treatment)    HPI:  Ms. Sarah A Riedell is a(n) 34 year old female who presents today as a return patient for extensive alopecia areata. She is here to review safety lab results and initiate treatment.     Patient is otherwise feeling well, without additional skin concerns.    Labs Reviewed:  CBC, CMP, lipids, iron/iron binding capacity, ferritin, vitamin D, HCG, hepatitis screen, HIV screen, quant gold - wnl except for the lipid profile. HDL is slightly below the normal range.    Physical Exam:  This was a telephone visit only.    Medications:  Current Outpatient Medications   Medication Sig Dispense Refill    prednisoLONE acetate (PRED FORTE) 1 % ophthalmic suspension Place 1 drop into both eyes daily.      tirzepatide-Weight Management (ZEPBOUND) 7.5 MG/0.5ML prefilled pen Inject 0.5 mLs (7.5 mg) subcutaneously every 7 days. 2 mL 3    NONFORMULARY One scalp prosthesis for alopecia areata (Patient not taking: Reported on 6/16/2025) 1 each 1     No current facility-administered medications for this visit.      Past Medical/Surgical History:   Patient Active  Problem List   Diagnosis    CARDIOVASCULAR SCREENING; LDL GOAL LESS THAN 160    AA (alopecia areata)    Autoimmune disease    Atrophy of skin    Onychodystrophy     Past Medical History:   Diagnosis Date    Alopecia universalis     Fibroadenoma of breast          Again, thank you for allowing me to participate in the care of your patient.      Sincerely,    Andreia Edwards MD

## 2025-06-16 NOTE — NURSING NOTE
Dermatology Rooming Note    Sarah A Riedell's goals for this visit include:   Chief Complaint   Patient presents with    Hair Loss     Review lab results and discuss treatment     Tomeka Chang LPN

## 2025-06-16 NOTE — PROGRESS NOTES
[facility] Dermatology Note  Encounter Date: Jun 16, 2025  Telephone ([phone]). Location of teledermatologist: [facility]/Department of Dermatology  Start time: 4:59. End time: :5:08 -  actual conversation    Dermatology Problem List:  1.  Alopecia areata (totalis), previously focal patchy disease that responded well to ILK but around April 2017 developed diffuse shedding in context of IUD placement (~Nov 2016). IUD now removed.  - Current rx: here today to start a GAYATHRI inhibitor, minoxidil 5% for eyebrows  - Prior rx:  intermittent ILK; clobetasol cream under occlusion nightly 3x weekly, anti-dandruff shampoo, light therapy wand, prednisone course May-August 2017 with cessation of acute flare, ketoconazole shampoo, topical minoxidil, and tacrolimus for eyebrows, clobetasol shampoo, fexofenadine, simvastatin/ezetimbe briefly, topical GAYATHRI inhibitor in trial at [place] for 2-3 months with no response.  - Labs 6/2/25 CBC, CMP, lipids, iron/iron binding capacity, ferritin, vitamin D, HCG, hepatitis screen, HIV screen, quant gold;  - reviewed and all good except for lipid profile with slightly low HDL  history of low vitamin D 6/2024  - Labs 5/2017 with significant iron deficiency (ferritin 4), started on iron supplementation, repeat iron studies 6/2019 with ferritin 25. Repeat TSH 3/7/19 wnl.   - SHBG elevated, free testosterone low 12/2018; recheck 6/6/2019 wnl  2. Melanoma in mother  3. Insulin resistance  - current tx: Zepbound (started May 2025, previously Wegovy since November 2024)  ____________________________     Assessment & Plan:   # Alopecia areata (totalis)  Chronic and stable. Had not been medically managing, but interested in starting GAYATHRI inhibitor. Baseline labs were obtained and here today to review results and finalize therapy plans.  - Initiate baricitinib 4 mg daily - follow-up safety labs and visit about 1 month after starting treatment and again at about 6 months after  starting treatment  - For eyebrows, can use topical minoxidil 5% daily.    #Abnormal lipid profile with slightly low HDL. Lucrecia will review these results with her endocrinologist this coming week.     ____________________________________________      Procedures Performed:    None    Follow-up: 1 month and 6 months after starting baricitinib    Staff:     Andreia Edwards MD  Professor   Department of Dermatology  [facility]   ____________________________________________    CC: Hair Loss (Review lab results and discuss treatment)    HPI:  Ms. Sarah A Riedell is a(n) 34 year old female who presents today as a return patient for extensive alopecia areata. She is here to review safety lab results and initiate treatment.     Patient is otherwise feeling well, without additional skin concerns.    Labs Reviewed:  CBC, CMP, lipids, iron/iron binding capacity, ferritin, vitamin D, HCG, hepatitis screen, HIV screen, quant gold - wnl except for the lipid profile. HDL is slightly below the normal range.    Physical Exam:  This was a telephone visit only.    Medications:  Current Outpatient Medications   Medication Sig Dispense Refill    prednisoLONE acetate (PRED FORTE) 1 % ophthalmic suspension Place 1 drop into both eyes daily.      tirzepatide-Weight Management (ZEPBOUND) 7.5 MG/0.5ML prefilled pen Inject 0.5 mLs (7.5 mg) subcutaneously every 7 days. 2 mL 3    NONFORMULARY One scalp prosthesis for alopecia areata (Patient not taking: Reported on 6/16/2025) 1 each 1     No current facility-administered medications for this visit.      Past Medical/Surgical History:   Patient Active Problem List   Diagnosis    CARDIOVASCULAR SCREENING; LDL GOAL LESS THAN 160    AA (alopecia areata)    Autoimmune disease    Atrophy of skin    Onychodystrophy     Past Medical History:   Diagnosis Date    Alopecia universalis     Fibroadenoma of breast        CC No referring provider defined for this encounter. on close of this  encounter.

## 2025-06-17 ENCOUNTER — MYC MEDICAL ADVICE (OUTPATIENT)
Dept: PHARMACY | Facility: CLINIC | Age: 35
End: 2025-06-17
Payer: COMMERCIAL

## 2025-06-18 ENCOUNTER — MYC MEDICAL ADVICE (OUTPATIENT)
Dept: DERMATOLOGY | Facility: CLINIC | Age: 35
End: 2025-06-18
Payer: COMMERCIAL

## 2025-06-18 ENCOUNTER — TELEPHONE (OUTPATIENT)
Dept: DERMATOLOGY | Facility: CLINIC | Age: 35
End: 2025-06-18
Payer: COMMERCIAL

## 2025-06-18 NOTE — TELEPHONE ENCOUNTER
6/18 Patient confirmed scheduled appointment:  Date: 4/20/26  Time: 9:40am  Visit type: Return [facility]  Provider: Grace  Location: [facility]  Testing/imaging: na  Additional notes:     Appointment order requested September 15th at 8:20am and March/April 2026.     I scheduled pt for April 2026 but pt said that 8:20am on 9/15/25 won't work because they have a dentist appt at 8am that day.    Pt said they could do 10 or later that same day or another day that same week. Didn't want to double book without prior approval.     Thanks!

## 2025-06-19 NOTE — TELEPHONE ENCOUNTER
Patient declined one month follow-up, states that it will take some time to receive baricitinib. She was ok scheduling a phone visit for September.    Tomeka Chang LPN

## 2025-06-25 ENCOUNTER — OFFICE VISIT (OUTPATIENT)
Dept: ENDOCRINOLOGY | Facility: CLINIC | Age: 35
End: 2025-06-25
Payer: COMMERCIAL

## 2025-06-25 ENCOUNTER — TELEPHONE (OUTPATIENT)
Dept: ENDOCRINOLOGY | Facility: CLINIC | Age: 35
End: 2025-06-25

## 2025-06-25 ENCOUNTER — MYC MEDICAL ADVICE (OUTPATIENT)
Dept: ENDOCRINOLOGY | Facility: CLINIC | Age: 35
End: 2025-06-25

## 2025-06-25 VITALS
HEIGHT: 67 IN | DIASTOLIC BLOOD PRESSURE: 71 MMHG | OXYGEN SATURATION: 97 % | BODY MASS INDEX: 30.64 KG/M2 | WEIGHT: 195.2 LBS | HEART RATE: 75 BPM | SYSTOLIC BLOOD PRESSURE: 100 MMHG

## 2025-06-25 DIAGNOSIS — E66.811 CLASS 1 OBESITY WITHOUT SERIOUS COMORBIDITY WITH BODY MASS INDEX (BMI) OF 32.0 TO 32.9 IN ADULT, UNSPECIFIED OBESITY TYPE: Primary | ICD-10-CM

## 2025-06-25 DIAGNOSIS — E88.819 INSULIN RESISTANCE: ICD-10-CM

## 2025-06-25 DIAGNOSIS — E04.1 THYROID NODULE: ICD-10-CM

## 2025-06-25 PROCEDURE — 99214 OFFICE O/P EST MOD 30 MIN: CPT | Performed by: INTERNAL MEDICINE

## 2025-06-25 PROCEDURE — 3074F SYST BP LT 130 MM HG: CPT | Performed by: INTERNAL MEDICINE

## 2025-06-25 PROCEDURE — 3078F DIAST BP <80 MM HG: CPT | Performed by: INTERNAL MEDICINE

## 2025-06-25 PROCEDURE — G2211 COMPLEX E/M VISIT ADD ON: HCPCS | Performed by: INTERNAL MEDICINE

## 2025-06-25 NOTE — TELEPHONE ENCOUNTER
PA Initiation - Key: [id number]    Medication: MOUNJARO 10 MG/0.5ML SC SOAJ  Insurance Company: [employer] Non-Specialty PA's - Phone [phone] Fax [phone]  Pharmacy Filling the Rx: [id number] - [place], [place] - [address] AT [address] [place] [address] & [facility]  Filling Pharmacy Phone: [phone]  Filling Pharmacy Fax: [phone]  Start Date: 6/25/2025

## 2025-06-25 NOTE — TELEPHONE ENCOUNTER
PRIOR AUTHORIZATION DENIED - CaseId:[id number]    Medication: MOUNJARO 10 MG/0.5ML SC SOAJ  Insurance Company: [employer] Non-Specialty PA's - Phone [phone] Fax [phone]  Denial Date: 6/25/2025  Denial Reason(s):                 Appeal Information:                   Patient Notified: Clinic to Review & Discuss Denial. Please advise on how to proceed. Thank you

## 2025-06-25 NOTE — LETTER
6/25/2025      Sarah A Riedell  [address] [place] [address]      Dear Colleague,    Thank you for referring your patient, Sarah A Riedell, to the [facility] [place]. Please see a copy of my visit note below.    Sarah A Riedell is a 34 year old yo female who presents today for follow-up of weight management. She has a personal history of alopecia areata.Last seen by me in Dec 2024.    Subjective:  Chief Complaint   Patient presents with     Weight Problem     Class 1 obesity without serious comorbidity with body mass index (BMI) of 32.0 to 32.9 in adult, unspecified obesity type       1) Weight management  Weight gain-- baseline 150, recently 180 and difficult to lose it, mostly over the last two years, working with nutritionists/trainers since 2017. Working with her current one since Spring 2022, no weight loss but has noted change in body composition    Medication trials:  10/2024- Wegovy- Weight 202  12/2024- Wegovy 1.7mg- Weight 198  5/2025- transition to Zepbound 7.5mg- Weight X  6/25/2025- Zepbound 7.5mg- weight 195      INTERVAL HISTORY:  - Changed to Zepbound in May-- overall minimal weight change, no side effects (was having headaches with Wegovy)  - Decreased inflammation is down, cravings are down, appetite is lower- able to not finish what is left on her plate  - Feels frustrated that is not seeing results, wondering about other testing  - Upcoming plan to start Olumiant (baricitinib)- GAYATHRI inhibitor, has been documented to cause weight gain alexis in first 6 mo      Wt Readings from Last 10 Encounters:   06/25/25 88.5 kg (195 lb 3.2 oz)   12/04/24 90.2 kg (198 lb 12.8 oz)   08/16/23 92 kg (202 lb 14.4 oz)   10/26/22 83.5 kg (184 lb)   10/02/15 71.4 kg (157 lb 6.4 oz)   12/04/14 71.8 kg (158 lb 6.4 oz)   06/12/14 71.7 kg (158 lb)   04/14/14 72.6 kg (160 lb)     Walks regularly with new cocker spaniel puppy, does occasional yoga.       2) Elevation of SHBG  See note from  "8/2023 for full history  Most recent 6/2024- 50 (off supplemental estrogen)     Active diagnoses this visit:  Data Unavailable     ROS: 10 point ROS neg other than the symptoms noted above in the HPI.      Medical, surgical, social, and family histories, medications and allergies reviewed and updated.  Past Medical History:   Diagnosis Date     Alopecia universalis      Fibroadenoma of breast        Past Surgical History:   Procedure Laterality Date     BREAST SURGERY      lump removed from left breast       Allergies:  Cefadroxil    Social History     Tobacco Use     Smoking status: Never     Smokeless tobacco: Never   Substance Use Topics     Alcohol use: Yes     Comment: rarely       Family History   Problem Relation Age of Onset     Asthma Mother      Hypertension Father      Cancer Paternal Grandmother         leukemia     Thyroid Disease Sister      Breast Cancer No family hx of      Melanoma No family hx of      Skin Cancer No family hx of      Objective:  /71   Pulse 75   Ht 1.702 m (5' 7\")   Wt 88.5 kg (195 lb 3.2 oz)   SpO2 97%   BMI 30.57 kg/m      Physical Exam   CONSTITUTIONAL: healthy, alert and NAD, responding appropriately  ENT: normocephalic, no visual evidence of trauma, normal nose and oral mucosa  EYES: conjunctivae and sclerae normal, no exophthalmos or proptosis  THYROID:  enlarged thyroid, R>L  LUNGS: no audible wheeze, cough or visible cyanosis, no visible retractions or increased work of breathing  EXTREMITIES: no hand tremors  NEUROLOGY: cranial nerves grossly intact with no obvious deficit.  SKIN:  no visualized skin lesions or rash, no edema visualized  PSYCH: mentation appears normal, normal judgement          Lab Results   Component Value Date/Time    TSH 1.48 06/17/2024 09:30 AM    TSH 2.35 10/26/2022 04:13 PM    TSH 1.92 04/23/2021 08:57 AM    T4 1.11 07/20/2023 10:11 AM    ESTROGEN <5 10/26/2022 04:13 PM     Last Comprehensive Metabolic Panel:  Sodium   Date Value Ref " Range Status   06/04/2025 143 135 - 145 mmol/L Final   01/24/2019 139 133 - 144 mmol/L Final     Potassium   Date Value Ref Range Status   06/04/2025 4.4 3.4 - 5.3 mmol/L Final   10/26/2022 3.8 3.4 - 5.3 mmol/L Final   01/24/2019 3.7 3.4 - 5.3 mmol/L Final     Chloride   Date Value Ref Range Status   06/04/2025 104 98 - 107 mmol/L Final   10/26/2022 106 94 - 109 mmol/L Final   01/24/2019 104 94 - 109 mmol/L Final     Carbon Dioxide   Date Value Ref Range Status   01/24/2019 28 20 - 32 mmol/L Final     Carbon Dioxide (CO2)   Date Value Ref Range Status   06/04/2025 27 22 - 29 mmol/L Final   10/26/2022 27 20 - 32 mmol/L Final     Anion Gap   Date Value Ref Range Status   06/04/2025 12 7 - 15 mmol/L Final   10/26/2022 5 3 - 14 mmol/L Final   01/24/2019 6 3 - 14 mmol/L Final     Glucose   Date Value Ref Range Status   06/04/2025 92 70 - 99 mg/dL Final   10/26/2022 81 70 - 99 mg/dL Final   01/24/2019 81 70 - 99 mg/dL Final     Urea Nitrogen   Date Value Ref Range Status   06/04/2025 8.8 6.0 - 20.0 mg/dL Final   10/26/2022 11 7 - 30 mg/dL Final   01/24/2019 16 7 - 30 mg/dL Final     Creatinine   Date Value Ref Range Status   06/04/2025 0.79 0.51 - 0.95 mg/dL Final   01/24/2019 0.95 0.52 - 1.04 mg/dL Final     GFR Estimate   Date Value Ref Range Status   06/04/2025 >90 >60 mL/min/1.73m2 Final     Comment:     eGFR calculated using 2021 CKD-EPI equation.   01/24/2019 81 >60 mL/min/[1.73_m2] Final     Comment:     Non  GFR Calc  Starting 12/18/2018, serum creatinine based estimated GFR (eGFR) will be   calculated using the Chronic Kidney Disease Epidemiology Collaboration   (CKD-EPI) equation.       Calcium   Date Value Ref Range Status   06/04/2025 9.2 8.8 - 10.4 mg/dL Final   01/24/2019 8.5 8.5 - 10.1 mg/dL Final           ASSESSMENT / PLAN:  (Z83.49) Family history of hypothyroidism  (L63.9) Alopecia areata    1) OBESITY-  Starting weight today  Body mass index is 30.57 kg/m .  1.   GOALS:  Target weight  loss > 5% (10 lbs) by November.  Long-term goal to maintain weight below  150 lbs  2.   DIET:  Portion controlled, carb-controlled diet, currently working with nutritionist, wants to continue on this  3.   BEHAVIOR: start keeping a food record using phone radha or paper log, set goals, sleep hygiene  4.   MEDS: Continue Zepbound with titration, early response/success, plan for titration q28 days, ordered for this. Will let me know if develops side effects and wants slower titration.    - No personal hx of pancreatitis, no family history of medullary thyroid cancer   - Reviewed common side effects of nausea/bloating, slow food moving through stomach, cramping abdominal pain  5.   SURG:  Patient does not meet NIH criteria for WLS- declines currently, but noted she could think about evaluation with weight management clinic  6.   ACTIVITY: Encouraged regular physical activity throughout the day, not in bursts  7.   BARRIERS: Various medications for alopecia  8.   TEACHING:  Can return to see RD/CDE for further diet teaching as needed    2) Goiter, R>L  - Check thyroid ultrasound    3) Previously completed evaluation for hormonal contributors to weight gain  - Thyroid within normal limits,   - Elevated insulin level 20.3, [id number] (SUSAN-IR- 5.3)  - DHEA-S 78  Have not checked IGF-1 or overngiht dex suppression test, which could consider      Return to clinic: 6 months    A total of 38 minutes were spent today 06/25/25 on this visit including chart review, history and counseling, documentation and other activities as detailed above.         Again, thank you for allowing me to participate in the care of your patient.        Sincerely,        Hina Murphy MD    Electronically signed

## 2025-06-25 NOTE — PROGRESS NOTES
Sarah A Riedell is a 34 year old yo female who presents today for follow-up of weight management. She has a personal history of alopecia areata.Last seen by me in Dec 2024.    Subjective:  Chief Complaint   Patient presents with    Weight Problem     Class 1 obesity without serious comorbidity with body mass index (BMI) of 32.0 to 32.9 in adult, unspecified obesity type       1) Weight management  Weight gain-- baseline 150, recently 180 and difficult to lose it, mostly over the last two years, working with nutritionists/trainers since 2017. Working with her current one since Spring 2022, no weight loss but has noted change in body composition    Medication trials:  10/2024- Wegovy- Weight 202  12/2024- Wegovy 1.7mg- Weight 198  5/2025- transition to Zepbound 7.5mg- Weight X  6/25/2025- Zepbound 7.5mg- weight 195      INTERVAL HISTORY:  - Changed to Zepbound in May-- overall minimal weight change, no side effects (was having headaches with Wegovy)  - Decreased inflammation is down, cravings are down, appetite is lower- able to not finish what is left on her plate  - Feels frustrated that is not seeing results, wondering about other testing  - Upcoming plan to start Olumiant (baricitinib)- GAYATHRI inhibitor, has been documented to cause weight gain alexis in first 6 mo      Wt Readings from Last 10 Encounters:   06/25/25 88.5 kg (195 lb 3.2 oz)   12/04/24 90.2 kg (198 lb 12.8 oz)   08/16/23 92 kg (202 lb 14.4 oz)   10/26/22 83.5 kg (184 lb)   10/02/15 71.4 kg (157 lb 6.4 oz)   12/04/14 71.8 kg (158 lb 6.4 oz)   06/12/14 71.7 kg (158 lb)   04/14/14 72.6 kg (160 lb)     Walks regularly with new cocker spaniel puppy, does occasional yoga.       2) Elevation of SHBG  See note from 8/2023 for full history  Most recent 6/2024- 50 (off supplemental estrogen)     Active diagnoses this visit:  Data Unavailable     ROS: 10 point ROS neg other than the symptoms noted above in the HPI.      Medical, surgical, social, and family  "histories, medications and allergies reviewed and updated.  Past Medical History:   Diagnosis Date    Alopecia universalis     Fibroadenoma of breast        Past Surgical History:   Procedure Laterality Date    BREAST SURGERY      lump removed from left breast       Allergies:  Cefadroxil    Social History     Tobacco Use    Smoking status: Never    Smokeless tobacco: Never   Substance Use Topics    Alcohol use: Yes     Comment: rarely       Family History   Problem Relation Age of Onset    Asthma Mother     Hypertension Father     Cancer Paternal Grandmother         leukemia    Thyroid Disease Sister     Breast Cancer No family hx of     Melanoma No family hx of     Skin Cancer No family hx of      Objective:  /71   Pulse 75   Ht 1.702 m (5' 7\")   Wt 88.5 kg (195 lb 3.2 oz)   SpO2 97%   BMI 30.57 kg/m      Physical Exam   CONSTITUTIONAL: healthy, alert and NAD, responding appropriately  ENT: normocephalic, no visual evidence of trauma, normal nose and oral mucosa  EYES: conjunctivae and sclerae normal, no exophthalmos or proptosis  THYROID:  enlarged thyroid, R>L  LUNGS: no audible wheeze, cough or visible cyanosis, no visible retractions or increased work of breathing  EXTREMITIES: no hand tremors  NEUROLOGY: cranial nerves grossly intact with no obvious deficit.  SKIN:  no visualized skin lesions or rash, no edema visualized  PSYCH: mentation appears normal, normal judgement          Lab Results   Component Value Date/Time    TSH 1.48 06/17/2024 09:30 AM    TSH 2.35 10/26/2022 04:13 PM    TSH 1.92 04/23/2021 08:57 AM    T4 1.11 07/20/2023 10:11 AM    ESTROGEN <5 10/26/2022 04:13 PM     Last Comprehensive Metabolic Panel:  Sodium   Date Value Ref Range Status   06/04/2025 143 135 - 145 mmol/L Final   01/24/2019 139 133 - 144 mmol/L Final     Potassium   Date Value Ref Range Status   06/04/2025 4.4 3.4 - 5.3 mmol/L Final   10/26/2022 3.8 3.4 - 5.3 mmol/L Final   01/24/2019 3.7 3.4 - 5.3 mmol/L Final "     Chloride   Date Value Ref Range Status   06/04/2025 104 98 - 107 mmol/L Final   10/26/2022 106 94 - 109 mmol/L Final   01/24/2019 104 94 - 109 mmol/L Final     Carbon Dioxide   Date Value Ref Range Status   01/24/2019 28 20 - 32 mmol/L Final     Carbon Dioxide (CO2)   Date Value Ref Range Status   06/04/2025 27 22 - 29 mmol/L Final   10/26/2022 27 20 - 32 mmol/L Final     Anion Gap   Date Value Ref Range Status   06/04/2025 12 7 - 15 mmol/L Final   10/26/2022 5 3 - 14 mmol/L Final   01/24/2019 6 3 - 14 mmol/L Final     Glucose   Date Value Ref Range Status   06/04/2025 92 70 - 99 mg/dL Final   10/26/2022 81 70 - 99 mg/dL Final   01/24/2019 81 70 - 99 mg/dL Final     Urea Nitrogen   Date Value Ref Range Status   06/04/2025 8.8 6.0 - 20.0 mg/dL Final   10/26/2022 11 7 - 30 mg/dL Final   01/24/2019 16 7 - 30 mg/dL Final     Creatinine   Date Value Ref Range Status   06/04/2025 0.79 0.51 - 0.95 mg/dL Final   01/24/2019 0.95 0.52 - 1.04 mg/dL Final     GFR Estimate   Date Value Ref Range Status   06/04/2025 >90 >60 mL/min/1.73m2 Final     Comment:     eGFR calculated using 2021 CKD-EPI equation.   01/24/2019 81 >60 mL/min/[1.73_m2] Final     Comment:     Non  GFR Calc  Starting 12/18/2018, serum creatinine based estimated GFR (eGFR) will be   calculated using the Chronic Kidney Disease Epidemiology Collaboration   (CKD-EPI) equation.       Calcium   Date Value Ref Range Status   06/04/2025 9.2 8.8 - 10.4 mg/dL Final   01/24/2019 8.5 8.5 - 10.1 mg/dL Final           ASSESSMENT / PLAN:  (Z83.49) Family history of hypothyroidism  (L63.9) Alopecia areata    1) OBESITY-  Starting weight today  Body mass index is 30.57 kg/m .  1.   GOALS:  Target weight loss > 5% (10 lbs) by November.  Long-term goal to maintain weight below  150 lbs  2.   DIET:  Portion controlled, carb-controlled diet, currently working with nutritionist, wants to continue on this  3.   BEHAVIOR: start keeping a food record using phone  radha or paper log, set goals, sleep hygiene  4.   MEDS: Continue Zepbound with titration, early response/success, plan for titration q28 days, ordered for this. Will let me know if develops side effects and wants slower titration.    - No personal hx of pancreatitis, no family history of medullary thyroid cancer   - Reviewed common side effects of nausea/bloating, slow food moving through stomach, cramping abdominal pain  5.   SURG:  Patient does not meet NIH criteria for WLS- declines currently, but noted she could think about evaluation with weight management clinic  6.   ACTIVITY: Encouraged regular physical activity throughout the day, not in bursts  7.   BARRIERS: Various medications for alopecia  8.   TEACHING:  Can return to see RD/CDE for further diet teaching as needed    2) Goiter, R>L  - Check thyroid ultrasound    3) Previously completed evaluation for hormonal contributors to weight gain  - Thyroid within normal limits,   - Elevated insulin level 20.3, [id number] (SUSAN-IR- 5.3)  - DHEA-S 78  Have not checked IGF-1 or overngiht dex suppression test, which could consider      Return to clinic: 6 months    A total of 38 minutes were spent today 06/25/25 on this visit including chart review, history and counseling, documentation and other activities as detailed above.

## 2025-06-25 NOTE — PATIENT INSTRUCTIONS
[email or web address]  [email or web address]    Make your lifestyle count  HDL cholesterol levels are often lower in people who have metabolic syndrome. This is a group of conditions that includes obesity, increased blood pressure and high blood sugar levels.    Increased physical activity can raise HDL cholesterol levels while lowering levels of triglycerides, the most common type of fat in the body. As little as 60 minutes of moderate-intensity aerobic exercise a week can achieve this. Moving more also can help you lose weight.    Try not to include trans fats in your diet. Trans fats can increase LDL cholesterol and lower HDL cholesterol levels. Foods made with shortening, such as cakes and cookies, often contain trans fats. So do most fried foods and some margarines. Also limit saturated fat, found in meats and full-fat dairy products.    If you smoke, find a way to quit. Smoking lowers HDL levels, especially in women. And it may raise LDL cholesterol and triglyceride levels.    Moderate use of alcohol has been linked with higher levels of HDL cholesterol. For healthy adults, moderate drinking means up to one drink a day for women and up to two drinks a day for men.    However, if you don't drink alcohol, don't start drinking to raise your HDL cholesterol levels. Too much alcohol can cause weight gain. And it might increase your blood pressure and triglyceride levels and raise your risk of certain cancers.      [facility]:  Adopt a heart-healthy diet  The food you eat can help drive your cholesterol levels down, says Dr. Aceves. Trademarks of a heart-healthy diet include:    Ample amounts of fruit and vegetables  Foods high in soluble fiber, such as oats, whole-grain cereals and legumes  Fish that are rich sources of omega-3 fatty acids (wild salmon, for  instance)  Eating heart-healthy foods is important. So is ensuring that the not-so-healthy stuff only makes an occasional guest appearance on your plate. Animal fats and other saturated fats can send cholesterol levels soaring. That means it s a good idea to limit your intake of:    Fatty cuts of beef, pork, lamb and veal, as well as the skin of poultry  Processed meats such as gardner, hot dogs and deli meats like salami, bologna and ham  Full-fat dairy products like whole milk, butter and sour cream  Egg yolks  Salt  Tropical oils (palm, palm kernel and coconut)  Fried and fast food  The Mediterranean diet and DASH diet are two eating plans noted for their heart-healthy approach to food.      Radiology  For scheduling at [facility] ([facility] call [phone] or [phone]   For scheduling in the [facility] ([facility][note 3]) call [phone]  or   [phone]

## 2025-06-25 NOTE — NURSING NOTE
"Chief Complaint   Patient presents with    Weight Problem     Class 1 obesity without serious comorbidity with body mass index (BMI) of 32.0 to 32.9 in adult, unspecified obesity type       Vitals:    06/25/25 0924   BP: 100/71   Pulse: 75   SpO2: 97%   Weight: 88.5 kg (195 lb 3.2 oz)   Height: 1.702 m (5' 7\")       Body mass index is 30.57 kg/m .    Trenton Barker CMA    "

## 2025-06-26 DIAGNOSIS — E04.1 THYROID NODULE: Primary | ICD-10-CM

## 2025-07-01 ENCOUNTER — HOSPITAL ENCOUNTER (OUTPATIENT)
Dept: ULTRASOUND IMAGING | Facility: CLINIC | Age: 35
Discharge: HOME | End: 2025-07-01
Attending: INTERNAL MEDICINE
Payer: COMMERCIAL

## 2025-07-01 DIAGNOSIS — E04.1 THYROID NODULE: ICD-10-CM

## 2025-07-01 PROCEDURE — 76536 US EXAM OF HEAD AND NECK: CPT

## 2025-07-02 ENCOUNTER — RESULTS FOLLOW-UP (OUTPATIENT)
Dept: MULTI SPECIALTY CLINIC | Facility: CLINIC | Age: 35
End: 2025-07-02